# Patient Record
Sex: FEMALE | Race: WHITE | Employment: OTHER | URBAN - NONMETROPOLITAN AREA
[De-identification: names, ages, dates, MRNs, and addresses within clinical notes are randomized per-mention and may not be internally consistent; named-entity substitution may affect disease eponyms.]

---

## 2018-03-12 ENCOUNTER — APPOINTMENT (OUTPATIENT)
Dept: CT IMAGING | Facility: OTHER | Age: 63
DRG: 694 | End: 2018-03-12
Attending: FAMILY MEDICINE
Payer: COMMERCIAL

## 2018-03-12 ENCOUNTER — HOSPITAL ENCOUNTER (INPATIENT)
Facility: OTHER | Age: 63
LOS: 1 days | Discharge: HOME | DRG: 694 | End: 2018-03-13
Attending: FAMILY MEDICINE | Admitting: INTERNAL MEDICINE
Payer: COMMERCIAL

## 2018-03-12 ENCOUNTER — OFFICE VISIT (OUTPATIENT)
Dept: FAMILY MEDICINE | Facility: OTHER | Age: 63
DRG: 694 | End: 2018-03-12
Attending: NURSE PRACTITIONER
Payer: COMMERCIAL

## 2018-03-12 VITALS
TEMPERATURE: 98.1 F | SYSTOLIC BLOOD PRESSURE: 212 MMHG | WEIGHT: 213.8 LBS | DIASTOLIC BLOOD PRESSURE: 120 MMHG | BODY MASS INDEX: 34.51 KG/M2

## 2018-03-12 DIAGNOSIS — R30.0 DYSURIA: Primary | ICD-10-CM

## 2018-03-12 DIAGNOSIS — N20.1 CALCULUS OF URETER: Primary | ICD-10-CM

## 2018-03-12 DIAGNOSIS — I10 ESSENTIAL HYPERTENSION: ICD-10-CM

## 2018-03-12 DIAGNOSIS — Z87.891 PERSONAL HISTORY OF TOBACCO USE, PRESENTING HAZARDS TO HEALTH: ICD-10-CM

## 2018-03-12 DIAGNOSIS — Z53.9 ERRONEOUS ENCOUNTER--DISREGARD: ICD-10-CM

## 2018-03-12 PROBLEM — N13.2 URETERAL STONE WITH HYDRONEPHROSIS: Status: ACTIVE | Noted: 2018-03-12

## 2018-03-12 PROBLEM — N23 RENAL COLIC: Status: ACTIVE | Noted: 2018-03-12

## 2018-03-12 LAB
ALBUMIN SERPL-MCNC: 4.5 G/DL (ref 3.5–5.7)
ALBUMIN UR-MCNC: ABNORMAL MG/DL
ALP SERPL-CCNC: 73 U/L (ref 34–104)
ALT SERPL W P-5'-P-CCNC: 24 U/L (ref 7–52)
ANION GAP SERPL CALCULATED.3IONS-SCNC: 10 MMOL/L (ref 3–14)
APPEARANCE UR: CLEAR
AST SERPL W P-5'-P-CCNC: 22 U/L (ref 13–39)
BACTERIA #/AREA URNS HPF: ABNORMAL /HPF
BASOPHILS # BLD AUTO: 0 10E9/L (ref 0–0.2)
BASOPHILS NFR BLD AUTO: 0.3 %
BILIRUB SERPL-MCNC: 0.7 MG/DL (ref 0.3–1)
BILIRUB UR QL STRIP: NEGATIVE
BUN SERPL-MCNC: 13 MG/DL (ref 7–25)
CALCIUM SERPL-MCNC: 9.4 MG/DL (ref 8.6–10.3)
CHLORIDE SERPL-SCNC: 104 MMOL/L (ref 98–107)
CO2 SERPL-SCNC: 26 MMOL/L (ref 21–31)
COLOR UR AUTO: YELLOW
CREAT SERPL-MCNC: 0.87 MG/DL (ref 0.6–1.2)
DIFFERENTIAL METHOD BLD: ABNORMAL
EOSINOPHIL # BLD AUTO: 0 10E9/L (ref 0–0.7)
EOSINOPHIL NFR BLD AUTO: 0.3 %
ERYTHROCYTE [DISTWIDTH] IN BLOOD BY AUTOMATED COUNT: 12.3 % (ref 10–15)
GFR SERPL CREATININE-BSD FRML MDRD: 66 ML/MIN/1.7M2
GLUCOSE SERPL-MCNC: 105 MG/DL (ref 70–105)
GLUCOSE UR STRIP-MCNC: NEGATIVE MG/DL
HCT VFR BLD AUTO: 46.4 % (ref 35–47)
HGB BLD-MCNC: 15.6 G/DL (ref 11.7–15.7)
HGB UR QL STRIP: ABNORMAL
IMM GRANULOCYTES # BLD: 0 10E9/L (ref 0–0.4)
IMM GRANULOCYTES NFR BLD: 0.3 %
KETONES UR STRIP-MCNC: NEGATIVE MG/DL
LEUKOCYTE ESTERASE UR QL STRIP: NEGATIVE
LYMPHOCYTES # BLD AUTO: 0.7 10E9/L (ref 0.8–5.3)
LYMPHOCYTES NFR BLD AUTO: 8.8 %
MAGNESIUM SERPL-MCNC: 1.9 MG/DL (ref 1.9–2.7)
MCH RBC QN AUTO: 29 PG (ref 26.5–33)
MCHC RBC AUTO-ENTMCNC: 33.6 G/DL (ref 31.5–36.5)
MCV RBC AUTO: 86 FL (ref 78–100)
MONOCYTES # BLD AUTO: 0.4 10E9/L (ref 0–1.3)
MONOCYTES NFR BLD AUTO: 5.1 %
NEUTROPHILS # BLD AUTO: 6.3 10E9/L (ref 1.6–8.3)
NEUTROPHILS NFR BLD AUTO: 85.2 %
NITRATE UR QL: NEGATIVE
NON-SQ EPI CELLS #/AREA URNS LPF: ABNORMAL /LPF
PH UR STRIP: 7 PH (ref 5–7)
PLATELET # BLD AUTO: 174 10E9/L (ref 150–450)
POTASSIUM SERPL-SCNC: 3.8 MMOL/L (ref 3.5–5.1)
PROT SERPL-MCNC: 7.7 G/DL (ref 6.4–8.9)
RBC # BLD AUTO: 5.38 10E12/L (ref 3.8–5.2)
RBC #/AREA URNS AUTO: ABNORMAL /HPF
SODIUM SERPL-SCNC: 140 MMOL/L (ref 134–144)
SOURCE: ABNORMAL
SP GR UR STRIP: 1.02 (ref 1–1.03)
UROBILINOGEN UR STRIP-ACNC: 0.2 EU/DL (ref 0.2–1)
WBC # BLD AUTO: 7.4 10E9/L (ref 4–11)
WBC #/AREA URNS AUTO: ABNORMAL /HPF

## 2018-03-12 PROCEDURE — 82365 CALCULUS SPECTROSCOPY: CPT | Performed by: INTERNAL MEDICINE

## 2018-03-12 PROCEDURE — 99223 1ST HOSP IP/OBS HIGH 75: CPT | Mod: 25 | Performed by: UROLOGY

## 2018-03-12 PROCEDURE — 25000128 H RX IP 250 OP 636: Performed by: FAMILY MEDICINE

## 2018-03-12 PROCEDURE — 74176 CT ABD & PELVIS W/O CONTRAST: CPT

## 2018-03-12 PROCEDURE — 83735 ASSAY OF MAGNESIUM: CPT | Performed by: INTERNAL MEDICINE

## 2018-03-12 PROCEDURE — 93010 ELECTROCARDIOGRAM REPORT: CPT | Performed by: INTERNAL MEDICINE

## 2018-03-12 PROCEDURE — 99285 EMERGENCY DEPT VISIT HI MDM: CPT | Mod: 25 | Performed by: FAMILY MEDICINE

## 2018-03-12 PROCEDURE — 25000132 ZZH RX MED GY IP 250 OP 250 PS 637: Performed by: FAMILY MEDICINE

## 2018-03-12 PROCEDURE — 25000132 ZZH RX MED GY IP 250 OP 250 PS 637: Performed by: INTERNAL MEDICINE

## 2018-03-12 PROCEDURE — 12000000 ZZH R&B MED SURG/OB

## 2018-03-12 PROCEDURE — 96374 THER/PROPH/DIAG INJ IV PUSH: CPT | Performed by: FAMILY MEDICINE

## 2018-03-12 PROCEDURE — 85025 COMPLETE CBC W/AUTO DIFF WBC: CPT | Performed by: FAMILY MEDICINE

## 2018-03-12 PROCEDURE — 80053 COMPREHEN METABOLIC PANEL: CPT | Performed by: FAMILY MEDICINE

## 2018-03-12 PROCEDURE — 96375 TX/PRO/DX INJ NEW DRUG ADDON: CPT | Performed by: FAMILY MEDICINE

## 2018-03-12 PROCEDURE — 81001 URINALYSIS AUTO W/SCOPE: CPT | Performed by: NURSE PRACTITIONER

## 2018-03-12 PROCEDURE — 25000128 H RX IP 250 OP 636: Performed by: INTERNAL MEDICINE

## 2018-03-12 PROCEDURE — 99222 1ST HOSP IP/OBS MODERATE 55: CPT | Mod: AI | Performed by: INTERNAL MEDICINE

## 2018-03-12 PROCEDURE — 93005 ELECTROCARDIOGRAM TRACING: CPT | Performed by: FAMILY MEDICINE

## 2018-03-12 PROCEDURE — 99285 EMERGENCY DEPT VISIT HI MDM: CPT | Mod: Z6 | Performed by: FAMILY MEDICINE

## 2018-03-12 PROCEDURE — 36415 COLL VENOUS BLD VENIPUNCTURE: CPT | Performed by: FAMILY MEDICINE

## 2018-03-12 RX ORDER — BISACODYL 10 MG
10 SUPPOSITORY, RECTAL RECTAL DAILY PRN
Status: DISCONTINUED | OUTPATIENT
Start: 2018-03-12 | End: 2018-03-13 | Stop reason: HOSPADM

## 2018-03-12 RX ORDER — TAMSULOSIN HYDROCHLORIDE 0.4 MG/1
0.8 CAPSULE ORAL DAILY
Status: DISCONTINUED | OUTPATIENT
Start: 2018-03-12 | End: 2018-03-13 | Stop reason: HOSPADM

## 2018-03-12 RX ORDER — KETOROLAC TROMETHAMINE 15 MG/ML
15 INJECTION, SOLUTION INTRAMUSCULAR; INTRAVENOUS ONCE
Status: COMPLETED | OUTPATIENT
Start: 2018-03-12 | End: 2018-03-12

## 2018-03-12 RX ORDER — HYDROMORPHONE HYDROCHLORIDE 1 MG/ML
0.5 INJECTION, SOLUTION INTRAMUSCULAR; INTRAVENOUS; SUBCUTANEOUS
Status: COMPLETED | OUTPATIENT
Start: 2018-03-12 | End: 2018-03-12

## 2018-03-12 RX ORDER — PROCHLORPERAZINE 25 MG
25 SUPPOSITORY, RECTAL RECTAL EVERY 12 HOURS PRN
Status: DISCONTINUED | OUTPATIENT
Start: 2018-03-12 | End: 2018-03-13 | Stop reason: HOSPADM

## 2018-03-12 RX ORDER — TAMSULOSIN HYDROCHLORIDE 0.4 MG/1
0.4 CAPSULE ORAL ONCE
Status: COMPLETED | OUTPATIENT
Start: 2018-03-12 | End: 2018-03-12

## 2018-03-12 RX ORDER — PROCHLORPERAZINE MALEATE 10 MG
10 TABLET ORAL EVERY 6 HOURS PRN
Status: DISCONTINUED | OUTPATIENT
Start: 2018-03-12 | End: 2018-03-13 | Stop reason: HOSPADM

## 2018-03-12 RX ORDER — LIDOCAINE 40 MG/G
CREAM TOPICAL
Status: DISCONTINUED | OUTPATIENT
Start: 2018-03-12 | End: 2018-03-13 | Stop reason: HOSPADM

## 2018-03-12 RX ORDER — AMOXICILLIN 250 MG
2 CAPSULE ORAL 2 TIMES DAILY PRN
Status: DISCONTINUED | OUTPATIENT
Start: 2018-03-12 | End: 2018-03-13 | Stop reason: HOSPADM

## 2018-03-12 RX ORDER — HYDROMORPHONE HYDROCHLORIDE 1 MG/ML
0.5 INJECTION, SOLUTION INTRAMUSCULAR; INTRAVENOUS; SUBCUTANEOUS
Status: DISCONTINUED | OUTPATIENT
Start: 2018-03-12 | End: 2018-03-13 | Stop reason: HOSPADM

## 2018-03-12 RX ORDER — ACETAMINOPHEN 325 MG/1
650 TABLET ORAL EVERY 4 HOURS PRN
Status: DISCONTINUED | OUTPATIENT
Start: 2018-03-12 | End: 2018-03-13 | Stop reason: HOSPADM

## 2018-03-12 RX ORDER — ACETAMINOPHEN 650 MG/1
650 SUPPOSITORY RECTAL EVERY 4 HOURS PRN
Status: DISCONTINUED | OUTPATIENT
Start: 2018-03-12 | End: 2018-03-13 | Stop reason: HOSPADM

## 2018-03-12 RX ORDER — KETOROLAC TROMETHAMINE 15 MG/ML
15 INJECTION, SOLUTION INTRAMUSCULAR; INTRAVENOUS EVERY 6 HOURS PRN
Status: DISCONTINUED | OUTPATIENT
Start: 2018-03-12 | End: 2018-03-13 | Stop reason: HOSPADM

## 2018-03-12 RX ORDER — POTASSIUM CHLORIDE 1500 MG/1
20-40 TABLET, EXTENDED RELEASE ORAL
Status: DISCONTINUED | OUTPATIENT
Start: 2018-03-12 | End: 2018-03-13 | Stop reason: HOSPADM

## 2018-03-12 RX ORDER — ONDANSETRON 4 MG/1
4 TABLET, ORALLY DISINTEGRATING ORAL EVERY 6 HOURS PRN
Status: DISCONTINUED | OUTPATIENT
Start: 2018-03-12 | End: 2018-03-13 | Stop reason: HOSPADM

## 2018-03-12 RX ORDER — SODIUM CHLORIDE 9 MG/ML
INJECTION, SOLUTION INTRAVENOUS CONTINUOUS
Status: DISCONTINUED | OUTPATIENT
Start: 2018-03-12 | End: 2018-03-13 | Stop reason: HOSPADM

## 2018-03-12 RX ORDER — NAPROXEN SODIUM 220 MG
220 TABLET ORAL 2 TIMES DAILY PRN
COMMUNITY
End: 2018-09-17

## 2018-03-12 RX ORDER — POTASSIUM CHLORIDE 7.45 MG/ML
10 INJECTION INTRAVENOUS
Status: DISCONTINUED | OUTPATIENT
Start: 2018-03-12 | End: 2018-03-13 | Stop reason: HOSPADM

## 2018-03-12 RX ORDER — MAGNESIUM SULFATE HEPTAHYDRATE 40 MG/ML
4 INJECTION, SOLUTION INTRAVENOUS EVERY 4 HOURS PRN
Status: DISCONTINUED | OUTPATIENT
Start: 2018-03-12 | End: 2018-03-13 | Stop reason: HOSPADM

## 2018-03-12 RX ORDER — NALOXONE HYDROCHLORIDE 0.4 MG/ML
.1-.4 INJECTION, SOLUTION INTRAMUSCULAR; INTRAVENOUS; SUBCUTANEOUS
Status: DISCONTINUED | OUTPATIENT
Start: 2018-03-12 | End: 2018-03-13 | Stop reason: HOSPADM

## 2018-03-12 RX ORDER — ONDANSETRON 2 MG/ML
4 INJECTION INTRAMUSCULAR; INTRAVENOUS EVERY 6 HOURS PRN
Status: DISCONTINUED | OUTPATIENT
Start: 2018-03-12 | End: 2018-03-13 | Stop reason: HOSPADM

## 2018-03-12 RX ORDER — METOCLOPRAMIDE HYDROCHLORIDE 5 MG/ML
10 INJECTION INTRAMUSCULAR; INTRAVENOUS ONCE
Status: COMPLETED | OUTPATIENT
Start: 2018-03-12 | End: 2018-03-12

## 2018-03-12 RX ORDER — AMOXICILLIN 250 MG
1 CAPSULE ORAL 2 TIMES DAILY PRN
Status: DISCONTINUED | OUTPATIENT
Start: 2018-03-12 | End: 2018-03-13 | Stop reason: HOSPADM

## 2018-03-12 RX ORDER — HYDROMORPHONE HYDROCHLORIDE 1 MG/ML
0.5 INJECTION, SOLUTION INTRAMUSCULAR; INTRAVENOUS; SUBCUTANEOUS
Status: DISCONTINUED | OUTPATIENT
Start: 2018-03-12 | End: 2018-03-12

## 2018-03-12 RX ADMIN — HYDROMORPHONE HYDROCHLORIDE 0.5 MG: 1 INJECTION, SOLUTION INTRAMUSCULAR; INTRAVENOUS; SUBCUTANEOUS at 11:27

## 2018-03-12 RX ADMIN — TAMSULOSIN HYDROCHLORIDE 0.4 MG: 0.4 CAPSULE ORAL at 10:13

## 2018-03-12 RX ADMIN — HYDROMORPHONE HYDROCHLORIDE 0.5 MG: 1 INJECTION, SOLUTION INTRAMUSCULAR; INTRAVENOUS; SUBCUTANEOUS at 13:35

## 2018-03-12 RX ADMIN — SODIUM CHLORIDE: 900 INJECTION, SOLUTION INTRAVENOUS at 13:26

## 2018-03-12 RX ADMIN — SODIUM CHLORIDE: 900 INJECTION, SOLUTION INTRAVENOUS at 21:43

## 2018-03-12 RX ADMIN — TAMSULOSIN HYDROCHLORIDE 0.4 MG: 0.4 CAPSULE ORAL at 13:25

## 2018-03-12 RX ADMIN — KETOROLAC TROMETHAMINE 15 MG: 15 INJECTION, SOLUTION INTRAMUSCULAR; INTRAVENOUS at 11:04

## 2018-03-12 RX ADMIN — METOCLOPRAMIDE 10 MG: 5 INJECTION, SOLUTION INTRAMUSCULAR; INTRAVENOUS at 11:00

## 2018-03-12 RX ADMIN — HYDROMORPHONE HYDROCHLORIDE 0.5 MG: 1 INJECTION, SOLUTION INTRAMUSCULAR; INTRAVENOUS; SUBCUTANEOUS at 16:05

## 2018-03-12 ASSESSMENT — ENCOUNTER SYMPTOMS
LIGHT-HEADEDNESS: 0
DYSURIA: 1
CHILLS: 0
BACK PAIN: 0
BRUISES/BLEEDS EASILY: 0
HEADACHES: 0
FEVER: 0
AGITATION: 0
STRIDOR: 0
ABDOMINAL DISTENTION: 0
SHORTNESS OF BREATH: 0
COUGH: 0
CONFUSION: 0
ADENOPATHY: 0
DIFFICULTY URINATING: 1

## 2018-03-12 ASSESSMENT — PAIN SCALES - GENERAL: PAINLEVEL: EXTREME PAIN (8)

## 2018-03-12 NOTE — CONSULTS
I was asked to see this patient by Dr Hedrick and provide my opinion about the following:  Ureteral stone    Type of Visit  Consult    Chief Complaint  Ureteral stone    HPI  Ms. Perea is a 62 year old female who presents with right ureteral stone.  The patient initially presented to the ED today and was admitted due to intractable nausea/vomiting and severe flank pain.  At that time the patient underwent a CT scan which revealed a 4 mm obstructing stone.  The patient currently denies fevers or chills.  The patient currently denies nausea or vomiting.  She has not undergone surgery in the past for stones.    Pain ROS  Location:  Right  flank  Quality:    Sharp and Dull  Provocative factors:  Nothing makes it worse  Palliative factors:   IV narcotics makes it better  Radiation:   None  Severity:   2/10 currently, 10/10 earlier today  Time:     Pain started yesterday      Past Medical History  She  has no past medical history on file.  Patient Active Problem List   Diagnosis     Renal colic     Ureteral stone with hydronephrosis     HTN (hypertension)       Past Surgical History  She  has a past surgical history that includes other surgical history.    Medications  None as an outpatient    Allergies  No Known Allergies    Social History  She  reports that she quit smoking about 27 years ago. She has never used smokeless tobacco. She reports that she does not drink alcohol.  No drug abuse.    Family History  No FH of  cancers.  +stones: father    Review of Systems  I personally reviewed the ROS with the patient.    Weight loss: No   Recent fever/chills: No  Night sweats: No   Current skin rash: No   Recent hair loss: No   Heat intolerance: No   Cold intolerance: No   Chest pain: No   Palpitations: No   Shortness of breath: No  Wheezing: No   Constipation: No   Diarrhea: No   Nausea: Yes    Vomiting: Yes   Kidney/side pain: Yes   Back pain: No  Frequent headaches: No  Dizziness: No   Leg swelling: No   Calf pain:  "No    Physical Exam  Vitals:    03/12/18 1125 03/12/18 1154 03/12/18 1200 03/12/18 1217   BP:  (!) 159/100 (!) 159/93 157/89   Pulse:    81   Resp:    20   Temp:    98.7  F (37.1  C)   TempSrc:    Tympanic   SpO2: 94% 92% 93% 96%   Weight:    94.7 kg (208 lb 12.4 oz)   Height:    1.651 m (5' 5\")     Constitutional: uncomfortable, WDWN  Head: NCAT  Eyes: Conjunctivae normal  Cardiovascular: Regular rate  Pulmonary/Chest: Respirations are even and non-labored bilaterally  Abdominal: Soft with no distension, tenderness, masses, guarding.    - left CVA tenderness    + right CVA tenderness  Neurological: A + O x 3,  cranial nerves II-XII grossly intact  Extremities: RADHA x 4, warm, no clubbing, no cyanosis  Skin: Pink, warm, dry with no rash  Psychiatric:  Normal mood and affect  Genitourinary: Nonpalpable bladder    Labs  Results for orders placed or performed during the hospital encounter of 03/12/18   CT Abdomen Pelvis without Contrast (stone protocol)    Narrative    PROCEDURE:  CT ABDOMEN PELVIS W/O CONTRAST    HISTORY:  Abdominal pain;  right flank pain, hypertension.    TECHNIQUE:  Helical CT of the abdomen and pelvis was performed without  intravenous contrast. Sagittal and coronal reformatted images were  reviewed.    COMPARISON:  CT abdomen pelvis 2/25/2008    FINDINGS:      The lung bases are clear.    The liver is low in attenuation, suggesting hepatic steatosis.  Otherwise, the noncontrast appearance of the liver, spleen, pancreas  and adrenal glands are unremarkable. The gallbladder is present.    There is mild right hydronephrosis and perinephric stranding. There is  a 5 mm calculus in the urinary bladder, likely a recently passed right  ureteral calculus. No additional renal calculi are seen. The left  kidney is intact.    The bowel is normal in caliber. There has been prior appendectomy.  There are a few diverticula in the colon without evidence of  diverticulitis.     The aorta is normal in size with " scattered atherosclerotic  calcifications.    No free fluid, free air or adenopathy is present.      No suspicious osseous lesions are identified. There are degenerative  changes in the spine.      Impression    IMPRESSION:    1. Mild right hydronephrosis and perinephric stranding. 5 mm calculus  in the bladder, likely a recently passed right ureteral stone.  2. Hepatic steatosis.    SOPHIA SAINZ MD   CBC with platelets differential   Result Value Ref Range    WBC 7.4 4.0 - 11.0 10e9/L    RBC Count 5.38 (H) 3.8 - 5.2 10e12/L    Hemoglobin 15.6 11.7 - 15.7 g/dL    Hematocrit 46.4 35.0 - 47.0 %    MCV 86 78 - 100 fl    MCH 29.0 26.5 - 33.0 pg    MCHC 33.6 31.5 - 36.5 g/dL    RDW 12.3 10.0 - 15.0 %    Platelet Count 174 150 - 450 10e9/L    Diff Method Automated Method     % Neutrophils 85.2 %    % Lymphocytes 8.8 %    % Monocytes 5.1 %    % Eosinophils 0.3 %    % Basophils 0.3 %    % Immature Granulocytes 0.3 %    Absolute Neutrophil 6.3 1.6 - 8.3 10e9/L    Absolute Lymphocytes 0.7 (L) 0.8 - 5.3 10e9/L    Absolute Monocytes 0.4 0.0 - 1.3 10e9/L    Absolute Eosinophils 0.0 0.0 - 0.7 10e9/L    Absolute Basophils 0.0 0.0 - 0.2 10e9/L    Abs Immature Granulocytes 0.0 0 - 0.4 10e9/L   Comprehensive metabolic panel   Result Value Ref Range    Sodium 140 134 - 144 mmol/L    Potassium 3.8 3.5 - 5.1 mmol/L    Chloride 104 98 - 107 mmol/L    Carbon Dioxide 26 21 - 31 mmol/L    Anion Gap 10 3 - 14 mmol/L    Glucose 105 70 - 105 mg/dL    Urea Nitrogen 13 7 - 25 mg/dL    Creatinine 0.87 0.60 - 1.20 mg/dL    GFR Estimate 66 >60 mL/min/1.7m2    GFR Estimate If Black 80 >60 mL/min/1.7m2    Calcium 9.4 8.6 - 10.3 mg/dL    Bilirubin Total 0.7 0.3 - 1.0 mg/dL    Albumin 4.5 3.5 - 5.7 g/dL    Protein Total 7.7 6.4 - 8.9 g/dL    Alkaline Phosphatase 73 34 - 104 U/L    ALT 24 7 - 52 U/L    AST 22 13 - 39 U/L   Magnesium   Result Value Ref Range    Magnesium 1.9 1.9 - 2.7 mg/dL     Results for JULISSA NAVARRO (MRN [id number]) as of  3/12/2018 18:02   3/12/2018 08:45   Color Urine Yellow   Appearance Urine Clear   Glucose Urine Negative   Bilirubin Urine Negative   Ketones Urine Negative   Specific Gravity Urine 1.025   pH Urine 7.0   Protein Albumin Urine Trace (A)   Urobilinogen Urine 0.2   Nitrite Urine Negative   Blood Urine Large (A)   Leukocyte Esterase Urine Negative   Source Midstream Urine   WBC Urine 0 - 5   RBC Urine 25-50 (A)   Bacteria Urine Moderate (A)   Squamous Epithelial /LPF Urine Few       Imaging  I personally reviewed and interpreted the images and report.  CT a/p   3/12/2018  IMPRESSION:    1. Mild right hydronephrosis and perinephric stranding. 5 mm calculus  in the bladder, likely a recently passed right ureteral stone.  2. Hepatic steatosis.    Assessment  Ms. Perea is a 62 year old female who presents with right ureteral stone.  Stone is not in bladder, it is at UVJ.    Discussed the treatment options for the right stone including medical expulsive therapy and ureteroscopy with laser lithotripsy.    After explaining the risks, benefits, and alternatives a decision was made to proceed with ureteroscopy/laser lithotripsy.    The patient was explained the specific risks of bleeding, pain, infection, and ureteral injury.    In addition, the patient was told a stent may need to be placed which could result in urinary frequency, urgency, dysuria, and flank pain with voiding.    It would require an office based procedure to remove it at a later date.    Finally, the patient was told if the stone was very impacted, that we would place a stent and return at a later date to treat the stone.      Plan  The patient was scheduled and consented for right ureteroscopy with laser lithotripsy, ureteral stent placement in the OR.  NPO at midnight  Strain urine and cancel surgery if she passes it

## 2018-03-12 NOTE — PROGRESS NOTES
"[facility] ADMISSION NOTE    Patient admitted to room 318 at approximately 1230 via wheel chair from emergency room. Patient was accompanied by daughter.     Verbal SBAR report received from Janneth prior to patient arrival.     Patient ambulated to bed independently. Patient alert and oriented X 3. The patient is not having any pain. 0-10 Pain Scale: 7. Admission vital signs: Blood pressure 157/89, pulse 81, temperature 98.7  F (37.1  C), temperature source Tympanic, resp. rate 20, height 1.651 m (5' 5\"), weight 94.7 kg (208 lb 12.4 oz), SpO2 96 %, not currently breastfeeding. Patient was oriented to plan of care, call light, bed controls, tv, telephone, bathroom and visiting hours.     The following safety risks were identified during admission: none. Yellow risk band applied: JASSON Huston    "

## 2018-03-12 NOTE — PROGRESS NOTES
"HPI:    Donna Perea is a 62 year old female who presents to clinic today for urinary concerns. She reports dysuria, low back pain. Sx started last night. Having minimal burning, but is having frequency, urgency. No hematuria. Denies n/v, abdominal pain or fevers. No constipation or diarrhea recently. Reports hx of dysuria that lasted 1 day about a 2 weeks ago but this resolved. Hx of smoking. Taking Aleve for back pain. No hx of urinary concerns.     She hasn't been to a doctor for many years. She has elevated blood pressure today, 212/120. She denies any headaches. Will get a \"sharpness\" in her chest when she is laying down reading at night. The pain is nothing that seems to concern her, nothing that would bring her to the clinic. She is on no medications. When asked about family hx of heart problems, blood pressure she says \"sure, I am sure there is\".     Given elevated blood pressure she was transported to ER for further evaluation and management. GISELLE Conroy CNP on 3/12/2018 at 12:54 PM      Appointment cancelled.   "

## 2018-03-12 NOTE — NURSING NOTE
"Patient presents today for painful urination and low back pain. Patient woke up last night with low back and painful urination. Patient states, \"one week ago, she had one day with urgency and painful urination, but it went away\".    Julissa Aguila LPN on 3/12/2018 at 8:53 AM    "

## 2018-03-12 NOTE — PLAN OF CARE
Problem: Urine Elimination Impaired (Adult)  Goal: Effective Urinary Elimination  Patient will demonstrate the desired outcomes by discharge/transition of care.   Patient has not voided since 12:30 pm. She does report voiding in the Er prior to coming over. Plan will be around five pm for patient to get up and try to void. She has no distention or feel the need to void.    Problem: Pain, Acute (Adult)  Goal: Acceptable Pain Control/Comfort Level  Patient will demonstrate the desired outcomes by discharge/transition of care.   When patient was admitted she reported no pain. She has had pain increase. The prn dilaudid does well for pain control. She has received two doses, since admit, frequency changed to two hours prn. Patient in agreement to med plan.

## 2018-03-12 NOTE — MR AVS SNAPSHOT
After Visit Summary   3/12/2018    Donna Perea    MRN: [id number]           Patient Information     Date Of Birth          1955        Visit Information        Provider Department      3/12/2018 9:45 AM Mary Butterfield APRN CNP [facility]        Today's Diagnoses     Dysuria    -  1    ERRONEOUS ENCOUNTER--DISREGARD           Follow-ups after your visit        Future tests that were ordered for you today     Open Standing Orders        Priority Remaining Interval Expires Ordered    Activity: Up ad jeri Routine [id number] PRN  3/12/2018    Daily weights Routine 1/1 DAILY  3/12/2018    Basic metabolic panel Routine 1/1 AM DRAW  3/12/2018    CBC with platelets Routine 1/1 AM DRAW  3/12/2018    Potassium Routine 100/100 CONDITIONAL (SPECIFY)  3/12/2018    Magnesium Routine 100/100 CONDITIONAL (SPECIFY)  3/12/2018    NPO per Anesthesia Guidelines for Procedure/Surgery Except for: Meds Routine 1/1 DIET EFFECTIVE MIDNIGHT  3/12/2018            Who to contact     If you have questions or need follow up information about today's clinic visit or your schedule please contact [facility] AND hospitals directly at [phone].  Normal or non-critical lab and imaging results will be communicated to you by [employer], letter or phone within 4 business days after the clinic has received the results. If you do not hear from us within 7 days, please contact the clinic through [employer] or phone. If you have a critical or abnormal lab result, we will notify you by phone as soon as possible.  Submit refill requests through [employer] or call your pharmacy and they will forward the refill request to us. Please allow 3 business days for your refill to be completed.          Additional Information About Your Visit        MyChart Information     [employer] lets you send messages to your doctor, view your test results, renew your prescriptions, schedule appointments and more. To sign up, go to  [email or web address] . Click on \"Log in\" on the left side of the screen, which will take you to the Welcome page. Then click on \"Sign up Now\" on the right side of the page.     You will be asked to enter the access code listed below, as well as some personal information. Please follow the directions to create your username and password.     Your access code is: [email or web address]  Expires: 6/10/2018 12:55 PM     Your access code will  in 90 days. If you need help or a new code, please call your [place] clinic or [phone].        Care EveryWhere ID     This is your Care EveryWhere ID. This could be used by other organizations to access your [place] medical records  [id number]        Your Vitals Were     Temperature Breastfeeding? BMI (Body Mass Index)             98.1  F (36.7  C) (Oral) No 34.51 kg/m2          Blood Pressure from Last 3 Encounters:   18 157/89   18 (!) 212/120   16 150/80    Weight from Last 3 Encounters:   18 208 lb 12.4 oz (94.7 kg)   18 213 lb 12.8 oz (97 kg)   16 204 lb (92.5 kg)              We Performed the Following     UA reflex to Microscopic     Urine Microscopic        Primary Care Provider Fax #    Physician No Ref-Primary [phone]       No address on file        Equal Access to Services     SERJIO CAMILO : Hadii beni sandrao Socarmelita, waaxda luqadaha, qaybta kaalmada ademarcialyaalycia, leeann bella . So [facility] [phone].    ATENCIÓN: Si habla español, tiene a means disposición servicios gratuitos de asistencia lingüística. Llame al [phone].    We comply with applicable federal civil rights laws and [place] laws. We do not discriminate on the basis of race, color, national origin, age, disability, sex, sexual orientation, or gender identity.            Thank you!     Thank you for choosing [facility] AND [facility]  for your care. Our goal is always to provide you with excellent care. Hearing back from " our patients is one way we can continue to improve our services. Please take a few minutes to complete the written survey that you may receive in the mail after your visit with us. Thank you!             Your Updated Medication List - Protect others around you: Learn how to safely use, store and throw away your medicines at www.disposemymeds.org.      Notice  As of 3/12/2018  9:41 AM    You have not been prescribed any medications.

## 2018-03-12 NOTE — IP AVS SNAPSHOT
[facility]    [address]    [place] [address]    Phone:  [phone]    Fax:  [phone]                                       After Visit Summary   3/12/2018    Donna Perea    MRN: [id number]           After Visit Summary Signature Page     I have received my discharge instructions, and my questions have been answered. I have discussed any challenges I see with this plan with the nurse or doctor.    ..........................................................................................................................................  Patient/Patient Representative Signature      ..........................................................................................................................................  Patient Representative Print Name and Relationship to Patient    ..................................................               ................................................  Date                                            Time    ..........................................................................................................................................  Reviewed by Signature/Title    ...................................................              ..............................................  Date                                                            Time

## 2018-03-12 NOTE — ED NOTES
"ED Nursing Triage Note (General)   ________________________________    Donna Perea is a 62 year old Female that presents to triage private car  With history of  Going to the clinic for right back and flank and brought over by clinic staff to the ED due to her high blood pressure reported by patient  Significant symptoms had onset 18 hour(s) ago.  BP (!) 211/112  Pulse 86  Temp 96.6  F (35.9  C) (Temporal)  Resp 18  Ht 1.651 m (5' 5\")  Wt 97.1 kg (214 lb)  SpO2 96%  BMI 35.61 kg/m2t  Patient appears alert , in moderate distress., and cooperative behavior.  Airway: intact  Breathing noted as Normal.  Circulation normal  Skinnormal  Action taken: to room 906       PRE HOSPITAL PRIOR LIVING SITUATION Spouse and Children  "

## 2018-03-12 NOTE — ED TRIAGE NOTES
Patient came to the clinic today to be seen for pain in her right flank. BP in clinic was 210/110. The patient has no history of this per her reporting. Patient sent from clinic to ER to be seen for acute hypertensive episode.   She currently has flank pain on the right side of a 7 or 8 out of 10. The patient states that the pain is less when she is moving or standing. The pain started yesterday evening 3/11/18. The patient states she had an episode of this pain about 2 weeks ago that resolved on its own.   The patient has taken 2 doses of Aleve since last night.   Girish Chen RN 9:38 AM 03/12/18

## 2018-03-12 NOTE — PROGRESS NOTES
Patient was up to the bathroom to void. Urine is dark kya in color, some mucous flecks noted in urine. Also noted a small brown flrck, this was sent for analysis. Patient currently has no pain.

## 2018-03-12 NOTE — PHARMACY-ADMISSION MEDICATION HISTORY
Pharmacy -- Admission Medication Reconciliation    Prior to admission (PTA) medications were reviewed and the patient's PTA medication list was updated.    Sources Consulted: patient interviewanastacia    The reliability of this Medication Reconciliation is: Reliability: Reliable    The following significant changes were made:  ADDED: naproxen 220 mg PRN and lactaid PRN     In addition, the patient's allergies were reviewed with the patient and updated as follows:   Allergies: Review of patient's allergies indicates no known allergies.    The pharmacist has reviewed with the patient that all personal medications should be removed from the building or locked in the belongings safe.  Patient shall only take medications ordered by the physician and administered by the nursing staff.       Medication barriers identified: takes no prescription medications   Medication adherence concerns: takes no prescription medications   Understanding of emergency medications: n/a    Susi Cisneros, 3/12/2018,  12:31 PM

## 2018-03-12 NOTE — ED NOTES
Patient puts call light on to ask to use bathroom.  Upon moving to bathroom became nauseated and wretching/vomiting.  States her pain is 9/10 at this time.  Request order from MD and this given.

## 2018-03-12 NOTE — IP AVS SNAPSHOT
MRN:[id number]                      After Visit Summary   3/12/2018    Donna Perea    MRN: [id number]           Thank you!     Thank you for choosing [facility] for your care. Our goal is always to provide you with excellent care. Hearing back from our patients is one way we can continue to improve our services. Please take a few minutes to complete the written survey that you may receive in the mail after you visit with us. Thank you!        Patient Information     Date Of Birth          1955        About your hospital stay     You were admitted on:  March 12, 2018 You last received care in the:  [facility]    You were discharged on:  March 13, 2018        Reason for your hospital stay       Kidney stone                  Who to Call     For medical emergencies, please call 911.  For non-urgent questions about your medical care, please call your primary care provider or clinic, None          Attending Provider     Provider Specialty    Nghia Guillen MD Family Practice    Rafael Hedrick MD Internal Medicine       Primary Care Provider Fax #    Physician No Ref-Primary [phone]      After Care Instructions     Activity       Your activity upon discharge: activity as tolerated            Diet       Follow this diet upon discharge: Orders Placed This Encounter      Regular Diet Adult              Discharge Instructions       Strain urine for 3 days, collect any stones and bring to appt.                  Follow-up Appointments     Follow-up and recommended labs and tests       Follow up with Yoselin Kilgore NP on 3/26 at 930 to discuss your blood pressure  Follow up with Dr. Ardon on 3/26  at 1030 for kidney stone results..                  Your next 10 appointments already scheduled     Mar 26, 2018  9:30 AM CDT   Office Visit with Lianna Kilgore PA-C   [facility] ([facility])    [address]  [place] [address]  "  [phone]           Bring a current list of meds and any records pertaining to this visit. For Physicals, please bring immunization records and any forms needing to be filled out. Please arrive 10 minutes early to complete paperwork.            Mar 26, 2018 10:30 AM CDT   Return Visit with Shola Ardon MD   [facility] ([facility])    [address]  [place] [id number] [phone]              Pending Results     Date and Time Order Name Status Description    3/12/2018 1819 Stone analysis In process     3/12/2018 1001 EKG 12-lead, tracing only In process             Statement of Approval     Ordered          18 0857  I have reviewed and agree with all the recommendations and orders detailed in this document.  EFFECTIVE NOW     Approved and electronically signed by:  Rafael Hedrick MD             Admission Information     Date & Time Provider Department Dept. Phone    3/12/2018 Rafael Hedrick MD [facility] [phone]      Your Vitals Were     Blood Pressure Pulse Temperature Respirations Height Weight    180/101 73 97.7  F (36.5  C) (Temporal) 18 1.651 m (5' 5\") 94.5 kg (208 lb 6.4 oz)    Pulse Oximetry BMI (Body Mass Index)                95% 34.68 kg/m2          MyChart Information     [employer] lets you send messages to your doctor, view your test results, renew your prescriptions, schedule appointments and more. To sign up, go to [email or web address] . Click on \"Log in\" on the left side of the screen, which will take you to the Welcome page. Then click on \"Sign up Now\" on the right side of the page.     You will be asked to enter the access code listed below, as well as some personal information. Please follow the directions to create your username and password.     Your access code is: [email or web address]  Expires: 6/10/2018 12:55 PM     Your access code will  in 90 days. If you need help or a new code, please " call your [place] clinic or [phone].        Care EveryWhere ID     This is your Care EveryWhere ID. This could be used by other organizations to access your [place] medical records  [id number]        Equal Access to Services     SERJIO CAMILO AH: Hadii aad ku hadcourtney Sojigarali, waaxda luqadaha, qaybta kaalmada adeegyada, leeann alegria shayne montoya. So [facility] [phone].    ATENCIÓN: Si habla español, tiene a means disposición servicios gratuitos de asistencia lingüística. Llame al [phone].    We comply with applicable federal civil rights laws and [place] laws. We do not discriminate on the basis of race, color, national origin, age, disability, sex, sexual orientation, or gender identity.               Review of your medicines      START taking        Dose / Directions    amLODIPine 5 MG tablet   Commonly known as:  NORVASC        Dose:  5 mg   Start taking on:  3/14/2018   Take 1 tablet (5 mg) by mouth daily   Quantity:  30 tablet   Refills:  3         CONTINUE these medicines which have NOT CHANGED        Dose / Directions    ALEVE 220 MG tablet   Generic drug:  naproxen sodium        Dose:  220 mg   Take 220 mg by mouth 2 times daily as needed for moderate pain   Refills:  0       LACTAID PO        Dose:  3000 Units   Take 3,000 Units by mouth daily with food As needed   Refills:  0            Where to get your medicines      These medications were sent to [facility] [address] [place], [place] - [address] [facility] [address]     Phone:  [phone]     amLODIPine 5 MG tablet                Protect others around you: Learn how to safely use, store and throw away your medicines at www.disposemymeds.org.             Medication List: This is a list of all your medications and when to take them. Check marks below indicate your daily home schedule. Keep this list as a reference.      Medications           Morning Afternoon Evening  Bedtime As Needed    ALEVE 220 MG tablet   Take 220 mg by mouth 2 times daily as needed for moderate pain   Generic drug:  naproxen sodium                                amLODIPine 5 MG tablet   Commonly known as:  NORVASC   Take 1 tablet (5 mg) by mouth daily   Start taking on:  3/14/2018   Last time this was given:  5 mg on 3/13/2018  8:33 AM                                LACTAID PO   Take 3,000 Units by mouth daily with food As needed

## 2018-03-12 NOTE — H&P
[facility]    History and Physical  Hospitalist       Date of Admission:  3/12/2018    Assessment & Plan   Donna Perea is a 62 year old female who presents with flank pain.    Principal Problem:    Renal colic    Assessment: stone noted on CT at [facility]. Severe pain, requiring IV hydrocodone, and antiemetics.    Plan: Admit, IV dilaudid, ketorolac. Strain urine. Consult Dr. Ardon.    Active Problems:    Ureteral stone with hydronephrosis    Assessment: present on admission     Plan: urology consult      HTN (hypertension)    Assessment: poor control. Not on chronic meds.    Plan: monitor once pain controlled    # Pain Assessment:   Current Pain Score 3/12/2018 3/12/2018 3/12/2018   Pain score (0-10) 9 5 7   - Donna is experiencing pain due to renal colic. Pain management was discussed and the plan was created in a collaborative fashion.  Donna's response to the current recommendations: engaged  - Opioid regimen: IV dilaudid  - Response to opioid medications: Reduction of symptoms   - Bowel regimen: not needed  - Pharmacologic adjuvants: Ketorolac        DVT Prophylaxis: Pneumatic Compression Devices  Code Status: No Order    Rafael Hedrick    Primary Care Physician   Physician No Ref-Primary    Chief Complaint   Right flank pain    History is obtained from the patient and chart review.    History of Present Illness   Donna Perea is a 62 year old female who presents with less than 24 hours of right flank pain.  The pain is severe and sharp.  He had an episode like this 2 weeks ago that resolved on its own.  She took ibuprofen last evening and had some relief but this morning the pain was severe and unrelenting.  Nothing she did could make it better.  In the emergency department she was hypertensive as well.  She has no history of hypertension and takes no medication regularly.  Denies fevers or chills.    CT without contrast shows a 5 mm stone at the right ureteral vesicular junction.  Due to  her severe and unrelenting pain requiring IV Dilaudid, she was admitted to the hospital for management.    Past Medical History    I have reviewed this patient's medical history and updated it with pertinent information if needed.   No past medical history on file.    Past Surgical History   I have reviewed this patient's surgical history and updated it with pertinent information if needed.  Past Surgical History:   Procedure Laterality Date     OTHER SURGICAL HISTORY      1955,SUR38,APPENDECTOMY OPEN,intussesception and appy       Prior to Admission Medications   None     Allergies   No Known Allergies    Social History   I have reviewed this patient's social history and updated it with pertinent information if needed. Donna Perea  reports that she quit smoking about 27 years ago. She has never used smokeless tobacco. She reports that she does not drink alcohol.    Family History   I have reviewed this patient's family history and updated it with pertinent information if needed.   No family history on file.    Review of Systems   The 10 point Review of Systems is negative other than noted in the HPI or here.     Physical Exam   Temp: 96.6  F (35.9  C) Temp src: Temporal BP: (!) 193/110 Pulse: 86   Resp: 18 SpO2: 98 % O2 Device: None (Room air)    Vital Signs with Ranges  Temp:  [96.6  F (35.9  C)-98.1  F (36.7  C)] 96.6  F (35.9  C)  Pulse:  [86] 86  Resp:  [18] 18  BP: (193-212)/(110-120) 193/110  SpO2:  [96 %-98 %] 98 %  214 lbs 0 oz    Constitutional: In obvious distress  Eyes: pupils reactive, extraocular movements intact. Anicteric sclera.   HEENT: TM's normal, oropharynx nonerythematous. Neck supple, no JVD.  Respiratory: no crackles noted, no wheezes.  Cardiovascular: regular, no murmur. No lower extremity edema.  GI: soft, non-tender, bowel sounds present.  Lymph/Hematologic: no cervical or supraclavicular LAD.  Genitourinary: deferred  Skin: no rashes, or sores  Musculoskeletal: no joint erythema or  swelling  Neurologic: cranial nerves symmetric. Neuro exam nonfocal  Psychiatric: alert and oriented x3. Interactive.       Data   Data reviewed today:  I personally reviewed the abdominal CT image(s) showing 5 mm ureteral stone.    Recent Labs  Lab 03/12/18  1100      POTASSIUM 3.8   CHLORIDE 104   CO2 26   BUN 13   CR 0.87   ANIONGAP 10   SIOBHAN 9.4      ALBUMIN 4.5   PROTTOTAL 7.7   BILITOTAL 0.7   ALKPHOS 73   ALT 24   AST 22       Recent Results (from the past 24 hour(s))   CT Abdomen Pelvis without Contrast (stone protocol)    Narrative    PROCEDURE:  CT ABDOMEN PELVIS W/O CONTRAST    HISTORY:  Abdominal pain;  right flank pain, hypertension.    TECHNIQUE:  Helical CT of the abdomen and pelvis was performed without  intravenous contrast. Sagittal and coronal reformatted images were  reviewed.    COMPARISON:  CT abdomen pelvis 2/25/2008    FINDINGS:      The lung bases are clear.    The liver is low in attenuation, suggesting hepatic steatosis.  Otherwise, the noncontrast appearance of the liver, spleen, pancreas  and adrenal glands are unremarkable. The gallbladder is present.    There is mild right hydronephrosis and perinephric stranding. There is  a 5 mm calculus in the urinary bladder, likely a recently passed right  ureteral calculus. No additional renal calculi are seen. The left  kidney is intact.    The bowel is normal in caliber. There has been prior appendectomy.  There are a few diverticula in the colon without evidence of  diverticulitis.     The aorta is normal in size with scattered atherosclerotic  calcifications.    No free fluid, free air or adenopathy is present.      No suspicious osseous lesions are identified. There are degenerative  changes in the spine.      Impression    IMPRESSION:    1. Mild right hydronephrosis and perinephric stranding. 5 mm calculus  in the bladder, likely a recently passed right ureteral stone.  2. Hepatic steatosis.    SOPHIA SAINZ MD

## 2018-03-12 NOTE — ED PROVIDER NOTES
History     Chief Complaint   Patient presents with     Hypertension   Patient came to the clinic today to be seen for pain in her right flank. BP in clinic was 210/110. The patient has no history of this per her reporting. Patient sent from clinic to ER to be seen for acute hypertensive episode.   She currently has flank pain on the right side of a 7 or 8 out of 10. The patient states that the pain is less when she is moving or standing. The pain started yesterday evening 3/11/18. The patient states she had an episode of this pain about 2 weeks ago that resolved on its own.   The patient has taken 2 doses of Aleve since last night.   Girish Chen RN 9:38 AM 03/12/18     HEATHER Perea is a 62 year old female who presents the emergency department from clinic with elevated blood pressure and right flank pain.  Also burning with urination and frequency recently.  Report given from Kevan Butterfield in the clinic.  Patient denies any chest pain or shortness of breath.  I personally reviewed nurse's notes above, similar history related to me.  Problem List:    Patient Active Problem List    Diagnosis Date Noted     Renal colic 03/12/2018     Priority: Medium     Ureteral stone with hydronephrosis 03/12/2018     Priority: Medium     HTN (hypertension) 03/12/2018     Priority: Medium        Past Medical History:    No past medical history on file.    Past Surgical History:    Past Surgical History:   Procedure Laterality Date     OTHER SURGICAL HISTORY      1955,SUR38,APPENDECTOMY OPEN,intussesception and appy       Family History:    No family history on file.    Social History:  Marital Status:   [4]  Social History   Substance Use Topics     Smoking status: Former Smoker     Quit date: 1/15/1991     Smokeless tobacco: Never Used     Alcohol use No        Medications:      No current outpatient prescriptions on file.      Review of Systems   Constitutional: Negative for chills and fever.   HENT: Negative for  "congestion.    Respiratory: Negative for cough, shortness of breath and stridor.    Cardiovascular: Negative for chest pain.   Gastrointestinal: Negative for abdominal distention.   Endocrine: Negative for polyuria.   Genitourinary: Positive for difficulty urinating and dysuria.   Musculoskeletal: Negative for back pain.   Neurological: Negative for light-headedness and headaches.   Hematological: Negative for adenopathy. Does not bruise/bleed easily.   Psychiatric/Behavioral: Negative for agitation and confusion.       Physical Exam   BP: (!) 211/112  Pulse: 86  Temp: 96.6  F (35.9  C)  Resp: 18  Height: 165.1 cm (5' 5\")  Weight: 97.1 kg (214 lb)  SpO2: 96 %      Physical Exam   Constitutional: She is oriented to person, place, and time.   HENT:   Mouth/Throat: No oropharyngeal exudate.   Eyes: Conjunctivae and EOM are normal. Pupils are equal, round, and reactive to light.   Neck: Normal range of motion.   Cardiovascular: Normal rate.    No murmur heard.  Pulmonary/Chest: Breath sounds normal. No respiratory distress. She has no wheezes.   Abdominal: She exhibits no distension. There is no tenderness. There is no rebound.   Musculoskeletal: She exhibits no edema.   Neurological: She is alert and oriented to person, place, and time. She has normal reflexes.   Skin: Skin is warm and dry.   Nursing note and vitals reviewed.      ED Course     ED Course     Procedures             EKG shows normal sinus rhythm, normal ECG, rate 77  Results for orders placed or performed during the hospital encounter of 03/12/18 (from the past 24 hour(s))   CT Abdomen Pelvis without Contrast (stone protocol)    Narrative    PROCEDURE:  CT ABDOMEN PELVIS W/O CONTRAST    HISTORY:  Abdominal pain;  right flank pain, hypertension.    TECHNIQUE:  Helical CT of the abdomen and pelvis was performed without  intravenous contrast. Sagittal and coronal reformatted images were  reviewed.    COMPARISON:  CT abdomen pelvis 2/25/2008    FINDINGS:  " "    The lung bases are clear.    The liver is low in attenuation, suggesting hepatic steatosis.  Otherwise, the noncontrast appearance of the liver, spleen, pancreas  and adrenal glands are unremarkable. The gallbladder is present.    There is mild right hydronephrosis and perinephric stranding. There is  a 5 mm calculus in the urinary bladder, likely a recently passed right  ureteral calculus. No additional renal calculi are seen. The left  kidney is intact.    The bowel is normal in caliber. There has been prior appendectomy.  There are a few diverticula in the colon without evidence of  diverticulitis.     The aorta is normal in size with scattered atherosclerotic  calcifications.    No free fluid, free air or adenopathy is present.      No suspicious osseous lesions are identified. There are degenerative  changes in the spine.      Impression    IMPRESSION:    1. Mild right hydronephrosis and perinephric stranding. 5 mm calculus  in the bladder, likely a recently passed right ureteral stone.  2. Hepatic steatosis.    SOPHIA SAINZ MD   Comprehensive metabolic panel   Result Value Ref Range    Sodium 140 134 - 144 mmol/L    Potassium 3.8 3.5 - 5.1 mmol/L    Chloride 104 98 - 107 mmol/L    Carbon Dioxide 26 21 - 31 mmol/L    Anion Gap 10 3 - 14 mmol/L    Glucose 105 70 - 105 mg/dL    Urea Nitrogen 13 7 - 25 mg/dL    Creatinine 0.87 0.60 - 1.20 mg/dL    GFR Estimate 66 >60 mL/min/1.7m2    GFR Estimate If Black 80 >60 mL/min/1.7m2    Calcium 9.4 8.6 - 10.3 mg/dL    Bilirubin Total 0.7 0.3 - 1.0 mg/dL    Albumin 4.5 3.5 - 5.7 g/dL    Protein Total 7.7 6.4 - 8.9 g/dL    Alkaline Phosphatase 73 34 - 104 U/L    ALT 24 7 - 52 U/L    AST 22 13 - 39 U/L   No Known Allergies  Patient Vitals for the past 24 hrs:   BP Temp Temp src Pulse Resp SpO2 Height Weight   03/12/18 1002 - - - - - 98 % - -   03/12/18 1001 (!) 193/110 - - - - - - -   03/12/18 0925 (!) 211/112 96.6  F (35.9  C) Temporal 86 18 96 % 1.651 m (5' 5\") 97.1 kg " (214 lb)     Results for orders placed or performed during the hospital encounter of 03/12/18   CT Abdomen Pelvis without Contrast (stone protocol)    Narrative    PROCEDURE:  CT ABDOMEN PELVIS W/O CONTRAST    HISTORY:  Abdominal pain;  right flank pain, hypertension.    TECHNIQUE:  Helical CT of the abdomen and pelvis was performed without  intravenous contrast. Sagittal and coronal reformatted images were  reviewed.    COMPARISON:  CT abdomen pelvis 2/25/2008    FINDINGS:      The lung bases are clear.    The liver is low in attenuation, suggesting hepatic steatosis.  Otherwise, the noncontrast appearance of the liver, spleen, pancreas  and adrenal glands are unremarkable. The gallbladder is present.    There is mild right hydronephrosis and perinephric stranding. There is  a 5 mm calculus in the urinary bladder, likely a recently passed right  ureteral calculus. No additional renal calculi are seen. The left  kidney is intact.    The bowel is normal in caliber. There has been prior appendectomy.  There are a few diverticula in the colon without evidence of  diverticulitis.     The aorta is normal in size with scattered atherosclerotic  calcifications.    No free fluid, free air or adenopathy is present.      No suspicious osseous lesions are identified. There are degenerative  changes in the spine.      Impression    IMPRESSION:    1. Mild right hydronephrosis and perinephric stranding. 5 mm calculus  in the bladder, likely a recently passed right ureteral stone.  2. Hepatic steatosis.    SOPHIA SAINZ MD   Comprehensive metabolic panel   Result Value Ref Range    Sodium 140 134 - 144 mmol/L    Potassium 3.8 3.5 - 5.1 mmol/L    Chloride 104 98 - 107 mmol/L    Carbon Dioxide 26 21 - 31 mmol/L    Anion Gap 10 3 - 14 mmol/L    Glucose 105 70 - 105 mg/dL    Urea Nitrogen 13 7 - 25 mg/dL    Creatinine 0.87 0.60 - 1.20 mg/dL    GFR Estimate 66 >60 mL/min/1.7m2    GFR Estimate If Black 80 >60 mL/min/1.7m2    Calcium  9.4 8.6 - 10.3 mg/dL    Bilirubin Total 0.7 0.3 - 1.0 mg/dL    Albumin 4.5 3.5 - 5.7 g/dL    Protein Total 7.7 6.4 - 8.9 g/dL    Alkaline Phosphatase 73 34 - 104 U/L    ALT 24 7 - 52 U/L    AST 22 13 - 39 U/L     Orders Placed This Encounter   Procedures     CBC with platelets differential     Last Lab Result: No results found for: HGB     Standing Status:   Standing     Number of Occurrences:   1     Comprehensive metabolic panel     Standing Status:   Standing     Number of Occurrences:   1     Medications   tamsulosin (FLOMAX) capsule 0.4 mg (0.4 mg Oral Given 3/12/18 1013)   metoclopramide (REGLAN) injection 10 mg (10 mg Intravenous Given 3/12/18 1100)   ketorolac (TORADOL) injection 15 mg (15 mg Intravenous Given 3/12/18 1104)   HYDROmorphone (PF) (DILAUDID) injection 0.5 mg (0.5 mg Intravenous Given 3/12/18 1127)         Assessments & Plan (with Medical Decision Making)     Patient feeling better over the course of her emergency department stay.  I discussed with both Dr. Ardon and Dr. Hedrick.  Given the size of the stone and her level of symptoms I feel it prudent to admit her for pain and nausea control.  Dr. Ardon will consult and Dr. Hedrick agreed to the admission.  Patient verbalized understanding of plan and is in agreement.  New Prescriptions    No medications on file       Final diagnoses:   Calculus of ureter       3/12/2018   [facility]     Nghia Guillen MD  03/12/18 7829

## 2018-03-13 VITALS
TEMPERATURE: 97.7 F | HEIGHT: 65 IN | SYSTOLIC BLOOD PRESSURE: 184 MMHG | BODY MASS INDEX: 34.72 KG/M2 | DIASTOLIC BLOOD PRESSURE: 100 MMHG | HEART RATE: 73 BPM | WEIGHT: 208.4 LBS | RESPIRATION RATE: 18 BRPM | OXYGEN SATURATION: 95 %

## 2018-03-13 LAB
ANION GAP SERPL CALCULATED.3IONS-SCNC: 7 MMOL/L (ref 3–14)
BUN SERPL-MCNC: 8 MG/DL (ref 7–25)
CALCIUM SERPL-MCNC: 8.9 MG/DL (ref 8.6–10.3)
CHLORIDE SERPL-SCNC: 107 MMOL/L (ref 98–107)
CO2 SERPL-SCNC: 28 MMOL/L (ref 21–31)
CREAT SERPL-MCNC: 0.69 MG/DL (ref 0.6–1.2)
ERYTHROCYTE [DISTWIDTH] IN BLOOD BY AUTOMATED COUNT: 12.1 % (ref 10–15)
GFR SERPL CREATININE-BSD FRML MDRD: 87 ML/MIN/1.7M2
GLUCOSE SERPL-MCNC: 106 MG/DL (ref 70–105)
HCT VFR BLD AUTO: 43.5 % (ref 35–47)
HGB BLD-MCNC: 14.1 G/DL (ref 11.7–15.7)
MCH RBC QN AUTO: 28.3 PG (ref 26.5–33)
MCHC RBC AUTO-ENTMCNC: 32.4 G/DL (ref 31.5–36.5)
MCV RBC AUTO: 87 FL (ref 78–100)
PLATELET # BLD AUTO: 194 10E9/L (ref 150–450)
POTASSIUM SERPL-SCNC: 3.5 MMOL/L (ref 3.5–5.1)
RBC # BLD AUTO: 4.98 10E12/L (ref 3.8–5.2)
SODIUM SERPL-SCNC: 142 MMOL/L (ref 134–144)
WBC # BLD AUTO: 4.3 10E9/L (ref 4–11)

## 2018-03-13 PROCEDURE — 36415 COLL VENOUS BLD VENIPUNCTURE: CPT | Performed by: INTERNAL MEDICINE

## 2018-03-13 PROCEDURE — 99239 HOSP IP/OBS DSCHRG MGMT >30: CPT | Performed by: INTERNAL MEDICINE

## 2018-03-13 PROCEDURE — 25000128 H RX IP 250 OP 636: Performed by: INTERNAL MEDICINE

## 2018-03-13 PROCEDURE — 85027 COMPLETE CBC AUTOMATED: CPT | Performed by: INTERNAL MEDICINE

## 2018-03-13 PROCEDURE — 25000132 ZZH RX MED GY IP 250 OP 250 PS 637: Performed by: INTERNAL MEDICINE

## 2018-03-13 PROCEDURE — 80048 BASIC METABOLIC PNL TOTAL CA: CPT | Performed by: INTERNAL MEDICINE

## 2018-03-13 RX ORDER — AMLODIPINE BESYLATE 5 MG/1
5 TABLET ORAL DAILY
Status: DISCONTINUED | OUTPATIENT
Start: 2018-03-13 | End: 2018-03-13 | Stop reason: HOSPADM

## 2018-03-13 RX ORDER — AMLODIPINE BESYLATE 5 MG/1
5 TABLET ORAL DAILY
Qty: 30 TABLET | Refills: 3 | Status: SHIPPED | OUTPATIENT
Start: 2018-03-14 | End: 2018-09-17

## 2018-03-13 RX ADMIN — SODIUM CHLORIDE: 900 INJECTION, SOLUTION INTRAVENOUS at 05:35

## 2018-03-13 RX ADMIN — AMLODIPINE BESYLATE 5 MG: 5 TABLET ORAL at 08:33

## 2018-03-13 ASSESSMENT — PATIENT HEALTH QUESTIONNAIRE - PHQ9: SUM OF ALL RESPONSES TO PHQ QUESTIONS 1-9: 4

## 2018-03-13 NOTE — PHARMACY
[facility]  [facility] of [facility]  [address] [place] [address]    March 13, 2018    Dear Pharmacist,    Your customer, Donna Perea, born on 1955, was recently discharged from [facility].  We have updated her medication list and want to alert you to the following:       Review of your medicines      START taking       Dose / Directions    amLODIPine 5 MG tablet   Commonly known as:  NORVASC        Dose:  5 mg   Start taking on:  3/14/2018   Take 1 tablet (5 mg) by mouth daily   Quantity:  30 tablet   Refills:  3         CONTINUE these medicines which have NOT CHANGED       Dose / Directions    ALEVE 220 MG tablet   Generic drug:  naproxen sodium        Dose:  220 mg   Take 220 mg by mouth 2 times daily as needed for moderate pain   Refills:  0       LACTAID PO        Dose:  3000 Units   Take 3,000 Units by mouth daily with food As needed   Refills:  0            Where to get your medicines      These medications were sent to [id number] - [place], [place] - [address] AT SEC of [facility] [address]  [address], [facility] [address]     Phone:  [phone]      amLODIPine 5 MG tablet             We also reviewed Donna Perea's allergy list and updated it as needed:  Allergies: Review of patient's allergies indicates no known allergies.    Thank you for continuing to care for Donna Perea.  We look forward to working together with you in the future.    Sincerely,  Susi Cisneros  [facility]

## 2018-03-13 NOTE — PHARMACY - DISCHARGE MEDICATION RECONCILIATION AND EDUCATION
Pharmacy:  Discharge Counseling and Medication Reconciliation    Donna Perea  [address] [facility] [address]  [phone] (home)   62 year old female  PCP: No Ref-Primary, Physician    Allergies: Review of patient's allergies indicates no known allergies.    Discharge Counseling:    Pharmacist met with patient (and/or family) today to review the medication portion of the After Visit Summary (with an emphasis on NEW medications) and to address patient's questions/concerns.    Summary of Education: Patient was educated on use, duration and side effects of amlodipine. She was also provided material on the DASH diet. We discussed blood pressure monitoring, goals and exercise.     Materials Provided:  MedCounselor sheets printed from Clinical Pharmacology on: amlodipine     Discharge Medication Reconciliation:    Susi Cisneros has reviewed the patient's discharge medication orders and has compared them to the inpatient medication administration record and to what the patient was taking prior to admission - any discrepancies have been resolved.    It has been determined that the patient has an adequate supply of medications available or which can be obtained from the patient's preferred pharmacy, which HE/SHE has confirmed as: [facility] [An updated medication list will be faxed to the patient's pharmacy.]    Thank you for the consult.    Susi Cisneros........March 13, 2018 9:13 AM

## 2018-03-13 NOTE — PROGRESS NOTES
[facility] DISCHARGE NOTE    Patient discharged to home at 10:10 AM via ambulation. Accompanied by daughter and staff. Discharge instructions reviewed with patient, opportunity offered to ask questions. Prescriptions sent to patients preferred pharmacy. All belongings sent with patient. Discussed follow up appts and establishing a PCP.    Tina Haro

## 2018-03-13 NOTE — DISCHARGE SUMMARY
"[facility]    Discharge Summary  Hospitalist    Date of Admission:  3/12/2018  Date of Discharge:  3/13/2018  Discharging Provider: Rafael Hedrick  Date of Service (when I saw the patient): 03/13/18    Discharge Diagnoses   Principal Problem:    Renal colic (3/12/2018)  Active Problems:    Ureteral stone with hydronephrosis (3/12/2018)    HTN (hypertension) (3/12/2018)      History of Present Illness   Donna Perea is an 62 year old female who presented with right flank pain. Per the H&P:  \"Donna Perea is a 62 year old female who presents with less than 24 hours of right flank pain.  The pain is severe and sharp.  He had an episode like this 2 weeks ago that resolved on its own.  She took ibuprofen last evening and had some relief but this morning the pain was severe and unrelenting.  Nothing she did could make it better.  In the emergency department she was hypertensive as well.  She has no history of hypertension and takes no medication regularly.  Denies fevers or chills.     CT without contrast shows a 5 mm stone at the right ureteral vesicular junction.  Due to her severe and unrelenting pain requiring IV Dilaudid, she was admitted to the hospital for management.\"    Hospital Course   Donna Perea was admitted on 3/12/2018.  The following problems were addressed during her hospitalization:    Kidney stone  The patient was admitted for IV pain medication due to severe pain.  Urology was consulted.  Her urine was strained during her hospital stay.  The evening of her first day in the hospital her pain improved significantly.  She had no recurrent pain overnight.  Stone was retrieved while straining her urine.  It was sent for analysis.  She will follow-up with Dr. Ardon in clinic for discussion on stone composition.    Hypertension  Patient has chronic hypertension.  Her blood pressure was persistently elevated in the hospital.  She was started on amlodipine 5 mg daily.  She will " follow-up in the clinic to establish care with a primary provider for blood pressure management.    # Discharge Pain Plan:   - Patient currently has NO PAIN and is not being prescribed pain medications on discharge.    Rafael Hedrick MD        Pending Results   These results will be followed up by Dr. Ardon  Unresulted Labs Ordered in the Past 30 Days of this Admission     Date and Time Order Name Status Description    3/12/2018 1819 Stone analysis In process           Code Status   Full Code       Primary Care Physician   Physician No Ref-Primary    Physical Exam   Temp: 97.7  F (36.5  C) Temp src: Temporal BP: (!) 180/101 Pulse: 73 Heart Rate: 77 Resp: 18 SpO2: 95 % O2 Device: None (Room air)    Vitals:    03/12/18 0925 03/12/18 1217 03/13/18 0500   Weight: 97.1 kg (214 lb) 94.7 kg (208 lb 12.4 oz) 94.5 kg (208 lb 6.4 oz)     Vital Signs with Ranges  Temp:  [96.6  F (35.9  C)-98.7  F (37.1  C)] 97.7  F (36.5  C)  Pulse:  [73-86] 73  Heart Rate:  [77-78] 77  Resp:  [18-24] 18  BP: (156-211)/() 180/101  SpO2:  [92 %-98 %] 95 %  I/O last 3 completed shifts:  In: 2140 [P.O.:160; I.V.:1980]  Out: 2250 [Urine:2250]    GENERAL: Comfortable, no apparent distress.  CARDIOVASCULAR: regular rate and rhythm, no murmur. No lower extremity edema   RESPIRATORY: Clear to auscultation bilaterally, no wheezes or crackles.  GI: non-tender, non-distended, normal bowel sounds.   SKIN: warm periphery, no rashes      Discharge Disposition   Discharged to home  Condition at discharge: Stable    Consultations This Hospital Stay   None    Time Spent on this Encounter   IRafael, personally saw the patient today and spent greater than 30 minutes discharging this patient.    Discharge Orders     Reason for your hospital stay   Kidney stone     Follow-up and recommended labs and tests   Follow up with Yoselin Kilgore NP on 3/26 at 930 to discuss your blood pressure  Follow up with Dr. Ardon on 3/26  at 1030 for kidney stone  results..     Activity   Your activity upon discharge: activity as tolerated     Discharge Instructions   Strain urine for 3 days, collect any stones and bring to appt.     Full Code     Diet   Follow this diet upon discharge: Orders Placed This Encounter     Regular Diet Adult         Discharge Medications   Current Discharge Medication List      START taking these medications    Details   amLODIPine (NORVASC) 5 MG tablet Take 1 tablet (5 mg) by mouth daily  Qty: 30 tablet, Refills: 3    Associated Diagnoses: Essential hypertension         CONTINUE these medications which have NOT CHANGED    Details   naproxen sodium (ALEVE) 220 MG tablet Take 220 mg by mouth 2 times daily as needed for moderate pain      Lactase (LACTAID PO) Take 3,000 Units by mouth daily with food As needed           Allergies   No Known Allergies  Data   Most Recent 3 CBC's:  Recent Labs   Lab Test  03/13/18   0451  03/12/18   1100   WBC  4.3  7.4   HGB  14.1  15.6   MCV  87  86   PLT  194  174      Most Recent 3 BMP's:  Recent Labs   Lab Test  03/13/18   0451  03/12/18   1100   NA  142  140   POTASSIUM  3.5  3.8   CHLORIDE  107  104   CO2  28  26   BUN  8  13   CR  0.69  0.87   ANIONGAP  7  10   SIOBHAN  8.9  9.4   GLC  106*  105     Most Recent 2 LFT's:  Recent Labs   Lab Test  03/12/18   1100   AST  22   ALT  24   ALKPHOS  73   BILITOTAL  0.7     Most Recent INR's and Anticoagulation Dosing History:  Anticoagulation Dose History     There is no flowsheet data to display.        Most Recent 3 Troponin's:No lab results found.  Most Recent Cholesterol Panel:No lab results found.  Most Recent 6 Bacteria Isolates From Any Culture (See EPIC Reports for Culture Details):No lab results found.  Most Recent TSH, T4 and A1c Labs:No lab results found.  Results for orders placed or performed during the hospital encounter of 03/12/18   CT Abdomen Pelvis without Contrast (stone protocol)    Narrative    PROCEDURE:  CT ABDOMEN PELVIS W/O CONTRAST    HISTORY:   Abdominal pain;  right flank pain, hypertension.    TECHNIQUE:  Helical CT of the abdomen and pelvis was performed without  intravenous contrast. Sagittal and coronal reformatted images were  reviewed.    COMPARISON:  CT abdomen pelvis 2/25/2008    FINDINGS:      The lung bases are clear.    The liver is low in attenuation, suggesting hepatic steatosis.  Otherwise, the noncontrast appearance of the liver, spleen, pancreas  and adrenal glands are unremarkable. The gallbladder is present.    There is mild right hydronephrosis and perinephric stranding. There is  a 5 mm calculus in the urinary bladder, likely a recently passed right  ureteral calculus. No additional renal calculi are seen. The left  kidney is intact.    The bowel is normal in caliber. There has been prior appendectomy.  There are a few diverticula in the colon without evidence of  diverticulitis.     The aorta is normal in size with scattered atherosclerotic  calcifications.    No free fluid, free air or adenopathy is present.      No suspicious osseous lesions are identified. There are degenerative  changes in the spine.      Impression    IMPRESSION:    1. Mild right hydronephrosis and perinephric stranding. 5 mm calculus  in the bladder, likely a recently passed right ureteral stone.  2. Hepatic steatosis.    SOPHIA SAINZ MD   '

## 2018-03-13 NOTE — PLAN OF CARE
Problem: Patient Care Overview  Goal: Plan of Care/Patient Progress Review  Patient has been NPO since midnight incase pain were to start again and need surgery. Patient is also showered. Albina Willis RN 3/13/2018 5:36 AM

## 2018-03-13 NOTE — PLAN OF CARE
Problem: Urine Elimination Impaired (Adult)  Goal: Effective Urinary Elimination  Patient will demonstrate the desired outcomes by discharge/transition of care.   Pt has denied pain. Voiding without difficulty.... Urine clear kya- strained with no calculus noted. Strainers sent with pt for home use for the next few days.

## 2018-03-13 NOTE — PLAN OF CARE
Problem: Patient Care Overview  Goal: Plan of Care/Patient Progress Review  Outcome: No Change  No change since initial assessment at  2145. Second assessment done at 0345. Patient denies any pain and voiding with out difficulty since passing the small brown nandini earlier. Patient says she feels great and wants to go home. Albina Willis RN 3/13/2018 4:48 AM

## 2018-03-13 NOTE — PLAN OF CARE
Problem: Patient Care Overview  Goal: Plan of Care/Patient Progress Review  Dr. Ardon notified of patients improved condition and decided to cancel surgery. Dr. Hedrick and patient will be notified of decision. Patient is eager to go home and is amazed at how well she feels. Albina Willis RN 3/13/2018 6:18 AM

## 2018-03-15 LAB
APPEARANCE STONE: NORMAL
COMPN STONE: NORMAL
NUMBER STONE: NORMAL
SIZE STONE: NORMAL MM
WT STONE: NORMAL MG

## 2018-03-26 ENCOUNTER — OFFICE VISIT (OUTPATIENT)
Dept: FAMILY MEDICINE | Facility: OTHER | Age: 63
End: 2018-03-26
Attending: PHYSICIAN ASSISTANT
Payer: COMMERCIAL

## 2018-03-26 ENCOUNTER — OFFICE VISIT (OUTPATIENT)
Dept: UROLOGY | Facility: OTHER | Age: 63
End: 2018-03-26
Attending: UROLOGY
Payer: COMMERCIAL

## 2018-03-26 VITALS
WEIGHT: 211.6 LBS | DIASTOLIC BLOOD PRESSURE: 86 MMHG | HEIGHT: 65 IN | SYSTOLIC BLOOD PRESSURE: 136 MMHG | BODY MASS INDEX: 35.25 KG/M2 | RESPIRATION RATE: 14 BRPM

## 2018-03-26 VITALS
DIASTOLIC BLOOD PRESSURE: 86 MMHG | SYSTOLIC BLOOD PRESSURE: 142 MMHG | TEMPERATURE: 99 F | WEIGHT: 213.8 LBS | BODY MASS INDEX: 35.58 KG/M2 | HEART RATE: 84 BPM

## 2018-03-26 DIAGNOSIS — N13.2 URETERAL STONE WITH HYDRONEPHROSIS: Primary | ICD-10-CM

## 2018-03-26 DIAGNOSIS — Z87.442 HISTORY OF KIDNEY STONES: ICD-10-CM

## 2018-03-26 DIAGNOSIS — I10 ESSENTIAL HYPERTENSION: Primary | ICD-10-CM

## 2018-03-26 PROBLEM — N23 RENAL COLIC: Status: RESOLVED | Noted: 2018-03-12 | Resolved: 2018-03-26

## 2018-03-26 PROCEDURE — 99213 OFFICE O/P EST LOW 20 MIN: CPT | Performed by: UROLOGY

## 2018-03-26 PROCEDURE — 99213 OFFICE O/P EST LOW 20 MIN: CPT | Performed by: PHYSICIAN ASSISTANT

## 2018-03-26 ASSESSMENT — PAIN SCALES - GENERAL
PAINLEVEL: NO PAIN (0)
PAINLEVEL: NO PAIN (0)

## 2018-03-26 NOTE — PROGRESS NOTES
"Type of Visit  Established    Chief Complaint  History of kidney stones    HPI  Ms. Perea is a 62 year old female with who was previously admitted for severe flank pain and IV pain control due to an obstructing ureteral stone.  She has no history of passed stone episodes.  As an inpatient she eventually passed the stone.  She currently does not follow dietary recommendations for stone prevention.  Today she denies flank pain hematuria or dysuria.      Review of Systems  I reviewed the ROS with the patient.    Nursing Notes:   Barbara Bob LPN  3/26/2018 10:51 AM  Signed  Here for 2 week follow up on stones. Passed stone in the hospital.  Review of Systems:    Weight loss:    No     Recent fever/chills:  No   Night sweats:   No  Current skin rash:  No   Recent hair loss:  No  Heat intolerance:  No   Cold intolerance:  No  Chest pain:   No   Palpitations:   No  Shortness of breath:  No   Wheezing:   No  Constipation:    No   Diarrhea:   No   Nausea:   No   Vomiting:   No   Kidney/side pain:  No   Back pain:   No  Frequent headaches:  No   Dizziness:     No  Leg swelling:   No   Calf pain:    No    Parents, brothers or sisters with history of kidney cancer:   No  Parents, brothers or sisters with history of bladder cancer: No  Father or brother with history of prostate cancer:  No  Barbara Bob LPN on 3/26/2018 at 10:26 AM      Physical Exam  Vitals:    03/26/18 1026   BP: 136/86   BP Location: Right arm   Patient Position: Sitting   Cuff Size: Adult Large   Resp: 14   Weight: 96 kg (211 lb 9.6 oz)   Height: 1.651 m (5' 5\")     Constitutional: NAD, WDWN.   Cardiovascular: Regular rate.  Pulmonary/Chest: Respirations are even and non-labored bilaterally.  Abdominal: Soft. No distension, tenderness, masses or guarding. No CVA tenderness.  Genitourinary: Nonpalpable bladder.  Extremities: RADHA x 4, Warm. No clubbing.  No cyanosis.    Skin: Pink, warm and dry.  No rashes noted.    Labs  Results for orders " placed or performed during the hospital encounter of 03/12/18   CT Abdomen Pelvis without Contrast (stone protocol)    Narrative    PROCEDURE:  CT ABDOMEN PELVIS W/O CONTRAST    HISTORY:  Abdominal pain;  right flank pain, hypertension.    TECHNIQUE:  Helical CT of the abdomen and pelvis was performed without  intravenous contrast. Sagittal and coronal reformatted images were  reviewed.    COMPARISON:  CT abdomen pelvis 2/25/2008    FINDINGS:      The lung bases are clear.    The liver is low in attenuation, suggesting hepatic steatosis.  Otherwise, the noncontrast appearance of the liver, spleen, pancreas  and adrenal glands are unremarkable. The gallbladder is present.    There is mild right hydronephrosis and perinephric stranding. There is  a 5 mm calculus in the urinary bladder, likely a recently passed right  ureteral calculus. No additional renal calculi are seen. The left  kidney is intact.    The bowel is normal in caliber. There has been prior appendectomy.  There are a few diverticula in the colon without evidence of  diverticulitis.     The aorta is normal in size with scattered atherosclerotic  calcifications.    No free fluid, free air or adenopathy is present.      No suspicious osseous lesions are identified. There are degenerative  changes in the spine.      Impression    IMPRESSION:    1. Mild right hydronephrosis and perinephric stranding. 5 mm calculus  in the bladder, likely a recently passed right ureteral stone.  2. Hepatic steatosis.    SOPHIA SAINZ MD   CBC with platelets differential   Result Value Ref Range    WBC 7.4 4.0 - 11.0 10e9/L    RBC Count 5.38 (H) 3.8 - 5.2 10e12/L    Hemoglobin 15.6 11.7 - 15.7 g/dL    Hematocrit 46.4 35.0 - 47.0 %    MCV 86 78 - 100 fl    MCH 29.0 26.5 - 33.0 pg    MCHC 33.6 31.5 - 36.5 g/dL    RDW 12.3 10.0 - 15.0 %    Platelet Count 174 150 - 450 10e9/L    Diff Method Automated Method     % Neutrophils 85.2 %    % Lymphocytes 8.8 %    % Monocytes 5.1 %    %  Eosinophils 0.3 %    % Basophils 0.3 %    % Immature Granulocytes 0.3 %    Absolute Neutrophil 6.3 1.6 - 8.3 10e9/L    Absolute Lymphocytes 0.7 (L) 0.8 - 5.3 10e9/L    Absolute Monocytes 0.4 0.0 - 1.3 10e9/L    Absolute Eosinophils 0.0 0.0 - 0.7 10e9/L    Absolute Basophils 0.0 0.0 - 0.2 10e9/L    Abs Immature Granulocytes 0.0 0 - 0.4 10e9/L   Comprehensive metabolic panel   Result Value Ref Range    Sodium 140 134 - 144 mmol/L    Potassium 3.8 3.5 - 5.1 mmol/L    Chloride 104 98 - 107 mmol/L    Carbon Dioxide 26 21 - 31 mmol/L    Anion Gap 10 3 - 14 mmol/L    Glucose 105 70 - 105 mg/dL    Urea Nitrogen 13 7 - 25 mg/dL    Creatinine 0.87 0.60 - 1.20 mg/dL    GFR Estimate 66 >60 mL/min/1.7m2    GFR Estimate If Black 80 >60 mL/min/1.7m2    Calcium 9.4 8.6 - 10.3 mg/dL    Bilirubin Total 0.7 0.3 - 1.0 mg/dL    Albumin 4.5 3.5 - 5.7 g/dL    Protein Total 7.7 6.4 - 8.9 g/dL    Alkaline Phosphatase 73 34 - 104 U/L    ALT 24 7 - 52 U/L    AST 22 13 - 39 U/L   Magnesium   Result Value Ref Range    Magnesium 1.9 1.9 - 2.7 mg/dL   Basic metabolic panel   Result Value Ref Range    Sodium 142 134 - 144 mmol/L    Potassium 3.5 3.5 - 5.1 mmol/L    Chloride 107 98 - 107 mmol/L    Carbon Dioxide 28 21 - 31 mmol/L    Anion Gap 7 3 - 14 mmol/L    Glucose 106 (H) 70 - 105 mg/dL    Urea Nitrogen 8 7 - 25 mg/dL    Creatinine 0.69 0.60 - 1.20 mg/dL    GFR Estimate 87 >60 mL/min/1.7m2    GFR Estimate If Black >90 >60 mL/min/1.7m2    Calcium 8.9 8.6 - 10.3 mg/dL   CBC with platelets   Result Value Ref Range    WBC 4.3 4.0 - 11.0 10e9/L    RBC Count 4.98 3.8 - 5.2 10e12/L    Hemoglobin 14.1 11.7 - 15.7 g/dL    Hematocrit 43.5 35.0 - 47.0 %    MCV 87 78 - 100 fl    MCH 28.3 26.5 - 33.0 pg    MCHC 32.4 31.5 - 36.5 g/dL    RDW 12.1 10.0 - 15.0 %    Platelet Count 194 150 - 450 10e9/L   EKG 12-lead, tracing only    Narrative    EKG Interpretation:    Rhythm: Normal Sinus   Rate: 77  Axis: Normal  QRS interval: Normal  Conduction: Normal  ST  Segments: Normal  QT interval: Normal  APC's Present: No  VPC's Present: No    Impression: Normal EKG.  No comparison available.    Iwona Ospina, DO  Internal Medicine     Stone analysis   Result Value Ref Range    Stone Composition SEE NOTE     Calculi Number Not Applicable     Calculi Size Not Applicable mm    Calculi Description Not Applicable     Stone Mass SEE NOTE mg       Assessment  Ms. Perea is a 62 year old female with recently passed ureteral stone requiring inpatient admission for pain control.  Clinically she has passed the stone.    Plan  Discussed stone prevention   Increase fluids, low-salt diet, increase citrate and moderate protein  Follow-up as needed

## 2018-03-26 NOTE — NURSING NOTE
Patient presents to the clinic for follow up after being seen in ER for a possible kidney stone.  STEVE Lombardo........................3/26/2018  9:38 AM

## 2018-03-26 NOTE — PROGRESS NOTES
Nursing Notes:   Janneth Puente LPN  3/26/2018 10:05 AM  Signed  Patient presents to the clinic for follow up after being seen in ER for a possible kidney stone.  STEVE Lombardo........................3/26/2018  9:38 AM      HPI:    Donna Perea is a 62 year old female who presents for follow up after being seen in ER for a possible kidney stone.  Patient was admitted on 3/12 to 3/13/2008.  Diagnosed with renal colic.  Active problems were ureteral stone with hydronephrosis and hypertension. CT without contrast shows a 5 mm stone at the right ureteral vesicular junction.    Admitted for IV pain medication due to severe pain.  Urology consulted.  Stone was retrieved while straining her urine.  Recheck today to discuss blood pressure as it was elevated in the emergency room.  Patient has history of chronic hypertension.  She was started on amlodipine 5 mg.  She is tolerating the amlodipine fine.  No side effects noted.  No chest pain, palpitations, problems breathing.  Not lightheaded or dizzy.  She states that she does eat a lot of salt in her diet.  No history of being on blood pressure medications in the past.  No vision or hearing concerns.  No recent eye check.    History reviewed. No pertinent past medical history.    Past Surgical History:   Procedure Laterality Date     OTHER SURGICAL HISTORY      1957,SUR38,APPENDECTOMY OPEN,intussesception and appy       History reviewed. No pertinent family history.    Social History     Social History     Marital status:      Spouse name: N/A     Number of children: N/A     Years of education: N/A     Occupational History     Not on file.     Social History Main Topics     Smoking status: Former Smoker     Quit date: 1/15/1991     Smokeless tobacco: Never Used     Alcohol use No     Drug use: No      Comment: Drug use: Not Asked     Sexual activity: Not on file     Other Topics Concern     Not on file     Social History Narrative    , lives with  daughter and her children.  Works at [employer], full time       Current Outpatient Prescriptions   Medication Sig Dispense Refill     amLODIPine (NORVASC) 5 MG tablet Take 1 tablet (5 mg) by mouth daily 30 tablet 3     naproxen sodium (ALEVE) 220 MG tablet Take 220 mg by mouth 2 times daily as needed for moderate pain       Lactase (LACTAID PO) Take 3,000 Units by mouth daily with food As needed         No Known Allergies    REVIEW OF SYSTEMS:  Refer to HPI.    EXAM:   Vitals:    /86 (BP Location: Right arm, Patient Position: Sitting, Cuff Size: Adult Regular)  Pulse 84  Temp 99  F (37.2  C) (Tympanic)  Wt 213 lb 12.8 oz (97 kg)  Breastfeeding? No  BMI 35.58 kg/m2    General Appearance: Pleasant, alert, appropriate appearance for age. No acute distress  Chest/Respiratory Exam: Normal chest wall and respirations. Clear to auscultation.  Cardiovascular Exam: Regular rate and rhythm. S1, S2, no murmur, click, gallop, or rubs.  Skin: no rash or abnormalities  Psychiatric Exam: Alert and oriented - appropriate affect.    PHQ Depression Screen  PHQ-9 SCORE 3/12/2018   Total Score 4       ASSESSMENT AND PLAN:    1. Essential hypertension        Blood pressure is elevated today. Encouraged to monitor blood pressure a couple times a week over the next few weeks. Return in 1-2 months for recheck appointment. Work on diet and exercise to decrease blood pressure. Weight loss is helpful.  Decrease salt intake.   Encouraged DASH diet.  Encouraged to schedule for physical.  Encouraged to schedule a eye check appointment.      Patient Instructions     Blood pressure is elevated today. Encouraged to monitor blood pressure a couple times a week over the next few weeks. Return in 1-2 months for recheck appointment. Work on diet and exercise to decrease blood pressure. Weight loss is helpful.  Decrease salt intake.           Low-Salt Diet  This diet removes foods that are high in salt. It also limits the amount of salt you use  when cooking. It is most often used for people with high blood pressure, edema (fluid retention), and kidney, liver, or heart disease.  Table salt contains the mineral sodium. Your body needs sodium to work normally. But too much sodium can make your health problems worse. Your healthcare provider is recommending a low-salt (also called low-sodium) diet for you. Your total daily allowance of salt is 1,500 to 2,300 milligrams (mg). It is less than 1 teaspoon of table salt. This means you can have only about 500 to 700 mg of sodium at each meal. People with certain health problems should limit salt intake to the lower end of the recommended range.    When you cook, don t add much salt. If you can cook without using salt, even better. Don t add salt to your food at the table.  When shopping, read food labels. Salt is often called sodium on the label. Choose foods that are salt-free, low salt, or very low salt. Note that foods with reduced salt may not lower your salt intake enough.    Beans, potatoes, and pasta  Ok: Dry beans, split peas, lentils, potatoes, rice, macaroni, pasta, spaghetti without added salt  Avoid: Potato chips, tortilla chips, and similar products  Breads and cereals  Ok: Low-sodium breads, rolls, cereals, and cakes; low-salt crackers, matzo crackers  Avoid: Salted crackers, pretzels, popcorn, Khmer toast, pancakes, muffins  Dairy  Ok: Milk, chocolate milk, hot chocolate mix, low-salt cheeses, and yogurt  Avoid: Processed cheese and cheese spreads; Roquefort, Camembert, and cottage cheese; buttermilk, instant breakfast drink  Desserts  Ok: Ice cream, frozen yogurt, juice bars, gelatin, cookies and pies, sugar, honey, jelly, hard candy  Avoid: Most pies, cakes and cookies prepared or processed with salt; instant pudding  Drinks  Ok: Tea, coffee, fizzy (carbonated) drinks, juices  Avoid: Flavored coffees, electrolyte replacement drinks, sports drinks  Meats  Ok: All fresh meat, fish, poultry, low-salt  tuna, eggs, egg substitute  Avoid: Smoked, pickled, brine-cured, or salted meats and fish. This includes dee, chipped beef, corned beef, hot dogs, deli meats, ham, kosher meats, salt pork, sausage, canned tuna, salted codfish, smoked salmon, herring, sardines, or anchovies.  Seasonings and spices  Ok: Most seasonings are okay. Good substitutes for salt include: fresh herb blends, hot sauce, lemon, garlic, reynolds, vinegar, dry mustard, parsley, cilantro, horseradish, tomato paste, regular margarine, mayonnaise, unsalted butter, cream cheese, vegetable oil, cream, low-salt salad dressing and gravy.  Avoid: Regular ketchup, relishes, pickles, soy sauce, teriyaki sauce, Worcestershire sauce, BBQ sauce, tartar sauce, meat tenderizer, chili sauce, regular gravy, regular salad dressing, salted butter  Soups  Ok: Low-salt soups and broths made with allowed foods  Avoid: Bouillon cubes, soups with smoked or salted meats, regular soup and broth  Vegetables  Ok: Most vegetables are okay; also low-salt tomato and vegetable juices  Avoid: Sauerkraut and other brine-soaked vegetables; pickles and other pickled vegetables; tomato juice, olives  Date Last Reviewed: 8/1/2016 2000-2017 [employer]. [address] [place] [address]. All rights reserved. This information is not intended as a substitute for professional medical care. Always follow your healthcare professional's instructions.        Eating Heart-Healthy Foods  Eating has a big impact on your heart health. In fact, eating healthier can improve several of your heart risks at once. For instance, it helps you manage weight, cholesterol, and blood pressure. Here are ideas to help you make heart-healthy changes without giving up all the foods and flavors you love.  Getting started    Talk with your health care provider about eating plans, such as the DASH or Mediterranean diet. You may also be referred to a dietitian.    Change a few things at a  time. Give yourself time to get used to a few eating changes before adding more.    Work to create a tasty, healthy eating plan that you can stick to for the rest of your life.    Goals for healthy eating  Below are some tips to improve your eating habits:    Limit saturated fats and trans fats. Saturated fats raise your levels of cholesterol, so keep these fats to a minimum. They are found in foods such as fatty meats, whole milk, cheese, and palm and coconut oils. Avoid trans fats because they lower good cholesterol as well as raise bad cholesterol. Trans fats are most often found in processed foods.    Reduce sodium (salt) intake. Eating too much salt may increase your blood pressure. Limit your sodium intake to 2,300 milligrams (mg) per day, or less if your health care provider recommends it. Dining out less often and eating fewer processed foods are two great ways to decrease the amount of salt you consume.    Managing calories. A calorie is a unit of energy. Your body burns calories for fuel, but if you eat more calories than your body burns, the extras are stored as fat. Your health care provider can help you create a diet plan to manage your calories. This will likely include eating healthier foods as well as exercising regularly. To help you track your progress, keep a diary to record what you eat and how often you exercise.  Choose the right foods  Aim to make these foods staples of your diet. If you have diabetes, you may have different recommendations than what is listed here:    Fruits and vegetable provide plenty of nutrients without a lot of calories. At meals, fill half your plate with these foods. Split the other half of your plate between whole grains and lean protein.    Whole grains are high in fiber and rich in vitamins and nutrients. Good choices include whole-wheat bread, pasta, and brown rice.    Lean proteins give you nutrition with less fat. Good choices include fish, skinless chicken, and  beans.    Low-fat or nonfat dairy provides nutrients without a lot of fat. Try low-fat or nonfat milk, cheese, or yogurt.    Healthy fats can be good for you in small amounts. These are unsaturated fats, such as olive oil, nuts, and fish. Try to have at least 2 servings per week of fatty fish such as salmon, sardines, mackerel, rainbow trout, and albacore tuna. These contain omega-3 fatty acids, which are good for your heart. Flaxseed is another source of a heart-healthy fat.  More on heart healthy eating    Read food labels  Healthy eating starts at the grocery store. Be sure to pay attention to food labels on packaged foods. Look for products that are high in fiber and protein, and low in saturated fat, cholesterol, and sodium. Avoid products that contain trans fat. And pay close attention to serving size. For instance, if you plan to eat two servings, double all the numbers on the label.  Prepare food right  A key part of healthy cooking is cutting down on added fat and salt. Look on the internet for lower-fat, lower-sodium recipes. Also, try these tips:    Remove fat from meat and skin from poultry before cooking.    Skim fat from the surface of soups and sauces.    Broil, boil, bake, steam, grill, and microwave food without added fats.    Choose ingredients that spice up your food without adding calories, fat, or sodium. Try these items: horseradish, hot sauce, lemon, mustard, nonfat salad dressings, and vinegar. For salt-free herbs and spices, try basil, cilantro, cinnamon, pepper, and rosemary.  Date Last Reviewed: 6/25/2015 2000-2017 [employer]. [address] [place] [address], [place] [address]. All rights reserved. This information is not intended as a substitute for professional medical care. Always follow your healthcare professional's instructions.        Eating Heart-Healthy Food: Using the DASH Plan    Eating for your heart doesn t have to be hard or boring. You just need to know how to make  healthier choices. The DASH eating plan has been developed to help you do just that. DASH stands for Dietary Approaches to Stop Hypertension. It is a plan that has been proven to be healthier for your heart and to lower your risk for high blood pressure. It can also help lower your risk for cancer, heart disease, osteoporosis, and diabetes.  Choosing from each food group  Choose foods from each of the food groups below each day. Try to get the recommended number of servings for each food group. The serving numbers are based on a diet of 2,000 calories a day. Talk to your doctor if you re unsure about your calorie needs. Along with getting the correct servings, the DASH plan also recommends a sodium intake less than 2,300 mg per day.        Grains  Servings: 6 to 8 a day  A serving is:    1 slice bread    1 ounce dry cereal    Half a cup cooked rice, pasta or cereal  Best choices: Whole grains and any grains high in fiber. Vegetables  Servings: 4 to 5 a day  A serving is:    1 cup raw leafy vegetable    Half a cup cut-up raw or cooked vegetable    Half a cup vegetable juice  Best choices: Fresh or frozen vegetables prepared without added salt or fat.   Fruits  Servings: 4 to 5 a day  A serving is:    1 medium fruit    One-quarter cup dried fruit    Half a cup fresh, frozen, or canned fruit    Half a cup of 100% fruit juices  Best choices: A variety of fresh fruits of different colors. Whole fruits are a better choice than fruit juices. Low-fat or fat-free dairy  Servings: 2 to 3 a day  A serving is:    1 cup milk    1 cup yogurt    One and a half ounces cheese  Best choices: Skim or 1% milk, low-fat or fat-free yogurt or buttermilk, and low-fat cheeses.         Lean meats, poultry, fish  Servings: 6 or fewer a day  A serving is:    1 ounce cooked meats, poultry, or fish    1 egg  Best choices: Lean poultry and fish. Trim away visible fat. Broil, grill, roast, or boil instead of frying. Remove skin from poultry before  eating. Limit how much red meat you eat.  Nuts, seeds, beans  Servings: 4 to 5 a week  A serving is:    One-third cup nuts (one and a half ounces)    2 tablespoons nut butter or seeds    Half a cup cooked dry beans or legumes  Best choices: Dry roasted nuts with no salt added, lentils, kidney beans, garbanzo beans, and whole rebolledo beans.   Fats and oils  Servings: 2 to 3 a day  A serving is:    1 teaspoon vegetable oil    1 teaspoon soft margarine    1 tablespoon mayonnaise    2 tablespoons salad dressing  Best choices: Nut and vegetable oils (nontropical vegetable oils), such as olive and canola oil. Sweets  Servings: 5 a week or fewer  A serving is:    1 tablespoon sugar, maple syrup, or honey    1 tablespoon jam or jelly    1 half-ounce jelly beans (about 15)    1 cup lemonade  Best choices: Dried fruit can be a satisfying sweet. Choose low-fat sweets. And watch your serving sizes!      For more on the DASH eating plan, visit:  www.nhlbi.nih.gov/health/health-topics/topics/dash   Date Last Reviewed: 6/1/2016 2000-2017 [employer]. [address] [place] [address], [place] [address]. All rights reserved. This information is not intended as a substitute for professional medical care. Always follow your healthcare professional's instructions.            Lianna Kilgore..................3/26/2018 10:01 AM

## 2018-03-26 NOTE — PATIENT INSTRUCTIONS
Blood pressure is elevated today. Encouraged to monitor blood pressure a couple times a week over the next few weeks. Return in 1-2 months for recheck appointment. Work on diet and exercise to decrease blood pressure. Weight loss is helpful.  Decrease salt intake.           Low-Salt Diet  This diet removes foods that are high in salt. It also limits the amount of salt you use when cooking. It is most often used for people with high blood pressure, edema (fluid retention), and kidney, liver, or heart disease.  Table salt contains the mineral sodium. Your body needs sodium to work normally. But too much sodium can make your health problems worse. Your healthcare provider is recommending a low-salt (also called low-sodium) diet for you. Your total daily allowance of salt is 1,500 to 2,300 milligrams (mg). It is less than 1 teaspoon of table salt. This means you can have only about 500 to 700 mg of sodium at each meal. People with certain health problems should limit salt intake to the lower end of the recommended range.    When you cook, don t add much salt. If you can cook without using salt, even better. Don t add salt to your food at the table.  When shopping, read food labels. Salt is often called sodium on the label. Choose foods that are salt-free, low salt, or very low salt. Note that foods with reduced salt may not lower your salt intake enough.    Beans, potatoes, and pasta  Ok: Dry beans, split peas, lentils, potatoes, rice, macaroni, pasta, spaghetti without added salt  Avoid: Potato chips, tortilla chips, and similar products  Breads and cereals  Ok: Low-sodium breads, rolls, cereals, and cakes; low-salt crackers, matzo crackers  Avoid: Salted crackers, pretzels, popcorn, [place] toast, pancakes, muffins  Dairy  Ok: Milk, chocolate milk, hot chocolate mix, low-salt cheeses, and yogurt  Avoid: Processed cheese and cheese spreads; Roquefort, Camembert, and cottage cheese; buttermilk, instant breakfast  drink  Desserts  Ok: Ice cream, frozen yogurt, juice bars, gelatin, cookies and pies, sugar, honey, jelly, hard candy  Avoid: Most pies, cakes and cookies prepared or processed with salt; instant pudding  Drinks  Ok: Tea, coffee, fizzy (carbonated) drinks, juices  Avoid: Flavored coffees, electrolyte replacement drinks, sports drinks  Meats  Ok: All fresh meat, fish, poultry, low-salt tuna, eggs, egg substitute  Avoid: Smoked, pickled, brine-cured, or salted meats and fish. This includes dee, chipped beef, corned beef, hot dogs, deli meats, ham, kosher meats, salt pork, sausage, canned tuna, salted codfish, smoked salmon, herring, sardines, or anchovies.  Seasonings and spices  Ok: Most seasonings are okay. Good substitutes for salt include: fresh herb blends, hot sauce, lemon, garlic, reynolds, vinegar, dry mustard, parsley, cilantro, horseradish, tomato paste, regular margarine, mayonnaise, unsalted butter, cream cheese, vegetable oil, cream, low-salt salad dressing and gravy.  Avoid: Regular ketchup, relishes, pickles, soy sauce, teriyaki sauce, Worcestershire sauce, BBQ sauce, tartar sauce, meat tenderizer, chili sauce, regular gravy, regular salad dressing, salted butter  Soups  Ok: Low-salt soups and broths made with allowed foods  Avoid: Bouillon cubes, soups with smoked or salted meats, regular soup and broth  Vegetables  Ok: Most vegetables are okay; also low-salt tomato and vegetable juices  Avoid: Sauerkraut and other brine-soaked vegetables; pickles and other pickled vegetables; tomato juice, olives  Date Last Reviewed: 8/1/2016 2000-2017 [employer]. [address] [place] [id number]. All rights reserved. This information is not intended as a substitute for professional medical care. Always follow your healthcare professional's instructions.        Eating Heart-Healthy Foods  Eating has a big impact on your heart health. In fact, eating healthier can improve several of your heart  risks at once. For instance, it helps you manage weight, cholesterol, and blood pressure. Here are ideas to help you make heart-healthy changes without giving up all the foods and flavors you love.  Getting started    Talk with your health care provider about eating plans, such as the DASH or Mediterranean diet. You may also be referred to a dietitian.    Change a few things at a time. Give yourself time to get used to a few eating changes before adding more.    Work to create a tasty, healthy eating plan that you can stick to for the rest of your life.    Goals for healthy eating  Below are some tips to improve your eating habits:    Limit saturated fats and trans fats. Saturated fats raise your levels of cholesterol, so keep these fats to a minimum. They are found in foods such as fatty meats, whole milk, cheese, and palm and coconut oils. Avoid trans fats because they lower good cholesterol as well as raise bad cholesterol. Trans fats are most often found in processed foods.    Reduce sodium (salt) intake. Eating too much salt may increase your blood pressure. Limit your sodium intake to 2,300 milligrams (mg) per day, or less if your health care provider recommends it. Dining out less often and eating fewer processed foods are two great ways to decrease the amount of salt you consume.    Managing calories. A calorie is a unit of energy. Your body burns calories for fuel, but if you eat more calories than your body burns, the extras are stored as fat. Your health care provider can help you create a diet plan to manage your calories. This will likely include eating healthier foods as well as exercising regularly. To help you track your progress, keep a diary to record what you eat and how often you exercise.  Choose the right foods  Aim to make these foods staples of your diet. If you have diabetes, you may have different recommendations than what is listed here:    Fruits and vegetable provide plenty of nutrients  without a lot of calories. At meals, fill half your plate with these foods. Split the other half of your plate between whole grains and lean protein.    Whole grains are high in fiber and rich in vitamins and nutrients. Good choices include whole-wheat bread, pasta, and brown rice.    Lean proteins give you nutrition with less fat. Good choices include fish, skinless chicken, and beans.    Low-fat or nonfat dairy provides nutrients without a lot of fat. Try low-fat or nonfat milk, cheese, or yogurt.    Healthy fats can be good for you in small amounts. These are unsaturated fats, such as olive oil, nuts, and fish. Try to have at least 2 servings per week of fatty fish such as salmon, sardines, mackerel, rainbow trout, and albacore tuna. These contain omega-3 fatty acids, which are good for your heart. Flaxseed is another source of a heart-healthy fat.  More on heart healthy eating    Read food labels  Healthy eating starts at the grocery store. Be sure to pay attention to food labels on packaged foods. Look for products that are high in fiber and protein, and low in saturated fat, cholesterol, and sodium. Avoid products that contain trans fat. And pay close attention to serving size. For instance, if you plan to eat two servings, double all the numbers on the label.  Prepare food right  A key part of healthy cooking is cutting down on added fat and salt. Look on the internet for lower-fat, lower-sodium recipes. Also, try these tips:    Remove fat from meat and skin from poultry before cooking.    Skim fat from the surface of soups and sauces.    Broil, boil, bake, steam, grill, and microwave food without added fats.    Choose ingredients that spice up your food without adding calories, fat, or sodium. Try these items: horseradish, hot sauce, lemon, mustard, nonfat salad dressings, and vinegar. For salt-free herbs and spices, try basil, cilantro, cinnamon, pepper, and rosemary.  Date Last Reviewed: 6/25/2015     [id number] [employer]. [address] [place] [address]. All rights reserved. This information is not intended as a substitute for professional medical care. Always follow your healthcare professional's instructions.        Eating Heart-Healthy Food: Using the DASH Plan    Eating for your heart doesn t have to be hard or boring. You just need to know how to make healthier choices. The DASH eating plan has been developed to help you do just that. DASH stands for Dietary Approaches to Stop Hypertension. It is a plan that has been proven to be healthier for your heart and to lower your risk for high blood pressure. It can also help lower your risk for cancer, heart disease, osteoporosis, and diabetes.  Choosing from each food group  Choose foods from each of the food groups below each day. Try to get the recommended number of servings for each food group. The serving numbers are based on a diet of 2,000 calories a day. Talk to your doctor if you re unsure about your calorie needs. Along with getting the correct servings, the DASH plan also recommends a sodium intake less than 2,300 mg per day.        Grains  Servings: 6 to 8 a day  A serving is:    1 slice bread    1 ounce dry cereal    Half a cup cooked rice, pasta or cereal  Best choices: Whole grains and any grains high in fiber. Vegetables  Servings: 4 to 5 a day  A serving is:    1 cup raw leafy vegetable    Half a cup cut-up raw or cooked vegetable    Half a cup vegetable juice  Best choices: Fresh or frozen vegetables prepared without added salt or fat.   Fruits  Servings: 4 to 5 a day  A serving is:    1 medium fruit    One-quarter cup dried fruit    Half a cup fresh, frozen, or canned fruit    Half a cup of 100% fruit juices  Best choices: A variety of fresh fruits of different colors. Whole fruits are a better choice than fruit juices. Low-fat or fat-free dairy  Servings: 2 to 3 a day  A serving is:    1 cup milk    1 cup yogurt    One  and a half ounces cheese  Best choices: Skim or 1% milk, low-fat or fat-free yogurt or buttermilk, and low-fat cheeses.         Lean meats, poultry, fish  Servings: 6 or fewer a day  A serving is:    1 ounce cooked meats, poultry, or fish    1 egg  Best choices: Lean poultry and fish. Trim away visible fat. Broil, grill, roast, or boil instead of frying. Remove skin from poultry before eating. Limit how much red meat you eat.  Nuts, seeds, beans  Servings: 4 to 5 a week  A serving is:    One-third cup nuts (one and a half ounces)    2 tablespoons nut butter or seeds    Half a cup cooked dry beans or legumes  Best choices: Dry roasted nuts with no salt added, lentils, kidney beans, garbanzo beans, and whole rebolledo beans.   Fats and oils  Servings: 2 to 3 a day  A serving is:    1 teaspoon vegetable oil    1 teaspoon soft margarine    1 tablespoon mayonnaise    2 tablespoons salad dressing  Best choices: Nut and vegetable oils (nontropical vegetable oils), such as olive and canola oil. Sweets  Servings: 5 a week or fewer  A serving is:    1 tablespoon sugar, maple syrup, or honey    1 tablespoon jam or jelly    1 half-ounce jelly beans (about 15)    1 cup lemonade  Best choices: Dried fruit can be a satisfying sweet. Choose low-fat sweets. And watch your serving sizes!      For more on the DASH eating plan, visit:  www.nhlbi.nih.gov/health/health-topics/topics/dash   Date Last Reviewed: 6/1/2016 2000-2017 [employer]. [address] [place] [address], [place] [address]. All rights reserved. This information is not intended as a substitute for professional medical care. Always follow your healthcare professional's instructions.

## 2018-03-26 NOTE — MR AVS SNAPSHOT
"              After Visit Summary   3/26/2018    Donna Perea    MRN: [id number]           Patient Information     Date Of Birth          1955        Visit Information        Provider Department      3/26/2018 10:30 AM Shola Ardon MD [facility]        Today's Diagnoses     Ureteral stone with hydronephrosis    -  1    History of kidney stones           Follow-ups after your visit        Who to contact     If you have questions or need follow up information about today's clinic visit or your schedule please contact [facility] directly at [phone].  Normal or non-critical lab and imaging results will be communicated to you by Gini[employer]nethart, letter or phone within 4 business days after the clinic has received the results. If you do not hear from us within 7 days, please contact the clinic through [employer] or phone. If you have a critical or abnormal lab result, we will notify you by phone as soon as possible.  Submit refill requests through [employer] or call your pharmacy and they will forward the refill request to us. Please allow 3 business days for your refill to be completed.          Additional Information About Your Visit        MyChart Information     [employer] lets you send messages to your doctor, view your test results, renew your prescriptions, schedule appointments and more. To sign up, go to [email or web address] . Click on \"Log in\" on the left side of the screen, which will take you to the Welcome page. Then click on \"Sign up Now\" on the right side of the page.     You will be asked to enter the access code listed below, as well as some personal information. Please follow the directions to create your username and password.     Your access code is: [email or web address]  Expires: 6/10/2018 12:55 PM     Your access code will  in 90 days. If you need help or a new code, please call your [facility] clinic or [phone].        Care EveryWhere ID     This is your " "Care EveryWhere ID. This could be used by other organizations to access your [facility] medical records  [id number]        Your Vitals Were     Respirations Height BMI (Body Mass Index)             14 1.651 m (5' 5\") 35.21 kg/m2          Blood Pressure from Last 3 Encounters:   03/26/18 136/86   03/26/18 142/86   03/13/18 (!) 184/100    Weight from Last 3 Encounters:   03/26/18 96 kg (211 lb 9.6 oz)   03/26/18 97 kg (213 lb 12.8 oz)   03/13/18 94.5 kg (208 lb 6.4 oz)              Today, you had the following     No orders found for display       Primary Care Provider Fax #    Physician No Ref-Primary [phone]       No address on file        Equal Access to Services     SERJIO CAMILO : Enzo Torres, waaxda luqadaha, qaybta kaalmada giorgioyaalycia, leeann bella . So [facility] [phone].    ATENCIÓN: Si habla español, tiene a means disposición servicios gratuitos de asistencia lingüística. Llame al [phone].    We comply with applicable federal civil rights laws and [place] laws. We do not discriminate on the basis of race, color, national origin, age, disability, sex, sexual orientation, or gender identity.            Thank you!     Thank you for choosing [facility] AND hospitals  for your care. Our goal is always to provide you with excellent care. Hearing back from our patients is one way we can continue to improve our services. Please take a few minutes to complete the written survey that you may receive in the mail after your visit with us. Thank you!             Your Updated Medication List - Protect others around you: Learn how to safely use, store and throw away your medicines at www.disposemymeds.org.          This list is accurate as of 3/26/18 11:28 AM.  Always use your most recent med list.                   Brand Name Dispense Instructions for use Diagnosis    ALEVE 220 MG tablet   Generic drug:  naproxen sodium      Take 220 mg by mouth 2 times daily as " needed for moderate pain        amLODIPine 5 MG tablet    NORVASC    30 tablet    Take 1 tablet (5 mg) by mouth daily    Essential hypertension       LACTAID PO      Take 3,000 Units by mouth daily with food As needed

## 2018-03-26 NOTE — NURSING NOTE
Here for 2 week follow up on stones. Passed stone in the hospital.  Review of Systems:    Weight loss:    No     Recent fever/chills:  No   Night sweats:   No  Current skin rash:  No   Recent hair loss:  No  Heat intolerance:  No   Cold intolerance:  No  Chest pain:   No   Palpitations:   No  Shortness of breath:  No   Wheezing:   No  Constipation:    No   Diarrhea:   No   Nausea:   No   Vomiting:   No   Kidney/side pain:  No   Back pain:   No  Frequent headaches:  No   Dizziness:     No  Leg swelling:   No   Calf pain:    No    Parents, brothers or sisters with history of kidney cancer:   No  Parents, brothers or sisters with history of bladder cancer: No  Father or brother with history of prostate cancer:  No  Barbara Bob LPN on 3/26/2018 at 10:26 AM

## 2018-03-26 NOTE — MR AVS SNAPSHOT
After Visit Summary   3/26/2018    Donna Perea    MRN: [id number]           Patient Information     Date Of Birth          1955        Visit Information        Provider Department      3/26/2018 9:30 AM Lianna Kilgore PA-C [facility]        Care Instructions    Blood pressure is elevated today. Encouraged to monitor blood pressure a couple times a week over the next few weeks. Return in 1-2 months for recheck appointment. Work on diet and exercise to decrease blood pressure. Weight loss is helpful.  Decrease salt intake.           Low-Salt Diet  This diet removes foods that are high in salt. It also limits the amount of salt you use when cooking. It is most often used for people with high blood pressure, edema (fluid retention), and kidney, liver, or heart disease.  Table salt contains the mineral sodium. Your body needs sodium to work normally. But too much sodium can make your health problems worse. Your healthcare provider is recommending a low-salt (also called low-sodium) diet for you. Your total daily allowance of salt is 1,500 to 2,300 milligrams (mg). It is less than 1 teaspoon of table salt. This means you can have only about 500 to 700 mg of sodium at each meal. People with certain health problems should limit salt intake to the lower end of the recommended range.    When you cook, don t add much salt. If you can cook without using salt, even better. Don t add salt to your food at the table.  When shopping, read food labels. Salt is often called sodium on the label. Choose foods that are salt-free, low salt, or very low salt. Note that foods with reduced salt may not lower your salt intake enough.    Beans, potatoes, and pasta  Ok: Dry beans, split peas, lentils, potatoes, rice, macaroni, pasta, spaghetti without added salt  Avoid: Potato chips, tortilla chips, and similar products  Breads and cereals  Ok: Low-sodium breads, rolls, cereals, and cakes; low-salt  crackers, matzo crackers  Avoid: Salted crackers, pretzels, popcorn, Uzbek toast, pancakes, muffins  Dairy  Ok: Milk, chocolate milk, hot chocolate mix, low-salt cheeses, and yogurt  Avoid: Processed cheese and cheese spreads; Roquefort, Camembert, and cottage cheese; buttermilk, instant breakfast drink  Desserts  Ok: Ice cream, frozen yogurt, juice bars, gelatin, cookies and pies, sugar, honey, jelly, hard candy  Avoid: Most pies, cakes and cookies prepared or processed with salt; instant pudding  Drinks  Ok: Tea, coffee, fizzy (carbonated) drinks, juices  Avoid: Flavored coffees, electrolyte replacement drinks, sports drinks  Meats  Ok: All fresh meat, fish, poultry, low-salt tuna, eggs, egg substitute  Avoid: Smoked, pickled, brine-cured, or salted meats and fish. This includes dee, chipped beef, corned beef, hot dogs, deli meats, ham, kosher meats, salt pork, sausage, canned tuna, salted codfish, smoked salmon, herring, sardines, or anchovies.  Seasonings and spices  Ok: Most seasonings are okay. Good substitutes for salt include: fresh herb blends, hot sauce, lemon, garlic, reynolds, vinegar, dry mustard, parsley, cilantro, horseradish, tomato paste, regular margarine, mayonnaise, unsalted butter, cream cheese, vegetable oil, cream, low-salt salad dressing and gravy.  Avoid: Regular ketchup, relishes, pickles, soy sauce, teriyaki sauce, Worcestershire sauce, BBQ sauce, tartar sauce, meat tenderizer, chili sauce, regular gravy, regular salad dressing, salted butter  Soups  Ok: Low-salt soups and broths made with allowed foods  Avoid: Bouillon cubes, soups with smoked or salted meats, regular soup and broth  Vegetables  Ok: Most vegetables are okay; also low-salt tomato and vegetable juices  Avoid: Sauerkraut and other brine-soaked vegetables; pickles and other pickled vegetables; tomato juice, olives  Date Last Reviewed: 8/1/2016 2000-2017 [employer]. [address] [place] [address], [place] [address].  All rights reserved. This information is not intended as a substitute for professional medical care. Always follow your healthcare professional's instructions.        Eating Heart-Healthy Foods  Eating has a big impact on your heart health. In fact, eating healthier can improve several of your heart risks at once. For instance, it helps you manage weight, cholesterol, and blood pressure. Here are ideas to help you make heart-healthy changes without giving up all the foods and flavors you love.  Getting started    Talk with your health care provider about eating plans, such as the DASH or Mediterranean diet. You may also be referred to a dietitian.    Change a few things at a time. Give yourself time to get used to a few eating changes before adding more.    Work to create a tasty, healthy eating plan that you can stick to for the rest of your life.    Goals for healthy eating  Below are some tips to improve your eating habits:    Limit saturated fats and trans fats. Saturated fats raise your levels of cholesterol, so keep these fats to a minimum. They are found in foods such as fatty meats, whole milk, cheese, and palm and coconut oils. Avoid trans fats because they lower good cholesterol as well as raise bad cholesterol. Trans fats are most often found in processed foods.    Reduce sodium (salt) intake. Eating too much salt may increase your blood pressure. Limit your sodium intake to 2,300 milligrams (mg) per day, or less if your health care provider recommends it. Dining out less often and eating fewer processed foods are two great ways to decrease the amount of salt you consume.    Managing calories. A calorie is a unit of energy. Your body burns calories for fuel, but if you eat more calories than your body burns, the extras are stored as fat. Your health care provider can help you create a diet plan to manage your calories. This will likely include eating healthier foods as well as exercising regularly. To  help you track your progress, keep a diary to record what you eat and how often you exercise.  Choose the right foods  Aim to make these foods staples of your diet. If you have diabetes, you may have different recommendations than what is listed here:    Fruits and vegetable provide plenty of nutrients without a lot of calories. At meals, fill half your plate with these foods. Split the other half of your plate between whole grains and lean protein.    Whole grains are high in fiber and rich in vitamins and nutrients. Good choices include whole-wheat bread, pasta, and brown rice.    Lean proteins give you nutrition with less fat. Good choices include fish, skinless chicken, and beans.    Low-fat or nonfat dairy provides nutrients without a lot of fat. Try low-fat or nonfat milk, cheese, or yogurt.    Healthy fats can be good for you in small amounts. These are unsaturated fats, such as olive oil, nuts, and fish. Try to have at least 2 servings per week of fatty fish such as salmon, sardines, mackerel, rainbow trout, and albacore tuna. These contain omega-3 fatty acids, which are good for your heart. Flaxseed is another source of a heart-healthy fat.  More on heart healthy eating    Read food labels  Healthy eating starts at the grocery store. Be sure to pay attention to food labels on packaged foods. Look for products that are high in fiber and protein, and low in saturated fat, cholesterol, and sodium. Avoid products that contain trans fat. And pay close attention to serving size. For instance, if you plan to eat two servings, double all the numbers on the label.  Prepare food right  A key part of healthy cooking is cutting down on added fat and salt. Look on the internet for lower-fat, lower-sodium recipes. Also, try these tips:    Remove fat from meat and skin from poultry before cooking.    Skim fat from the surface of soups and sauces.    Broil, boil, bake, steam, grill, and microwave food without added  fats.    Choose ingredients that spice up your food without adding calories, fat, or sodium. Try these items: horseradish, hot sauce, lemon, mustard, nonfat salad dressings, and vinegar. For salt-free herbs and spices, try basil, cilantro, cinnamon, pepper, and rosemary.  Date Last Reviewed: 6/25/2015 2000-2017 [employer]. [address] [place] [address], [place] [address]. All rights reserved. This information is not intended as a substitute for professional medical care. Always follow your healthcare professional's instructions.        Eating Heart-Healthy Food: Using the DASH Plan    Eating for your heart doesn t have to be hard or boring. You just need to know how to make healthier choices. The DASH eating plan has been developed to help you do just that. DASH stands for Dietary Approaches to Stop Hypertension. It is a plan that has been proven to be healthier for your heart and to lower your risk for high blood pressure. It can also help lower your risk for cancer, heart disease, osteoporosis, and diabetes.  Choosing from each food group  Choose foods from each of the food groups below each day. Try to get the recommended number of servings for each food group. The serving numbers are based on a diet of 2,000 calories a day. Talk to your doctor if you re unsure about your calorie needs. Along with getting the correct servings, the DASH plan also recommends a sodium intake less than 2,300 mg per day.        Grains  Servings: 6 to 8 a day  A serving is:    1 slice bread    1 ounce dry cereal    Half a cup cooked rice, pasta or cereal  Best choices: Whole grains and any grains high in fiber. Vegetables  Servings: 4 to 5 a day  A serving is:    1 cup raw leafy vegetable    Half a cup cut-up raw or cooked vegetable    Half a cup vegetable juice  Best choices: Fresh or frozen vegetables prepared without added salt or fat.   Fruits  Servings: 4 to 5 a day  A serving is:    1 medium fruit    One-quarter cup  dried fruit    Half a cup fresh, frozen, or canned fruit    Half a cup of 100% fruit juices  Best choices: A variety of fresh fruits of different colors. Whole fruits are a better choice than fruit juices. Low-fat or fat-free dairy  Servings: 2 to 3 a day  A serving is:    1 cup milk    1 cup yogurt    One and a half ounces cheese  Best choices: Skim or 1% milk, low-fat or fat-free yogurt or buttermilk, and low-fat cheeses.         Lean meats, poultry, fish  Servings: 6 or fewer a day  A serving is:    1 ounce cooked meats, poultry, or fish    1 egg  Best choices: Lean poultry and fish. Trim away visible fat. Broil, grill, roast, or boil instead of frying. Remove skin from poultry before eating. Limit how much red meat you eat.  Nuts, seeds, beans  Servings: 4 to 5 a week  A serving is:    One-third cup nuts (one and a half ounces)    2 tablespoons nut butter or seeds    Half a cup cooked dry beans or legumes  Best choices: Dry roasted nuts with no salt added, lentils, kidney beans, garbanzo beans, and whole rebolledo beans.   Fats and oils  Servings: 2 to 3 a day  A serving is:    1 teaspoon vegetable oil    1 teaspoon soft margarine    1 tablespoon mayonnaise    2 tablespoons salad dressing  Best choices: Nut and vegetable oils (nontropical vegetable oils), such as olive and canola oil. Sweets  Servings: 5 a week or fewer  A serving is:    1 tablespoon sugar, maple syrup, or honey    1 tablespoon jam or jelly    1 half-ounce jelly beans (about 15)    1 cup lemonade  Best choices: Dried fruit can be a satisfying sweet. Choose low-fat sweets. And watch your serving sizes!      For more on the DASH eating plan, visit:  www.nhlbi.nih.gov/health/health-topics/topics/dash   Date Last Reviewed: 6/1/2016 2000-2017 [employer]. [address] [place] [address], [place] [address]. All rights reserved. This information is not intended as a substitute for professional medical care. Always follow your healthcare  "professional's instructions.                Follow-ups after your visit        Your next 10 appointments already scheduled     Mar 26, 2018 10:30 AM CDT   Return Visit with Shola Ardon MD   [facility]    [address]  [place] [id number] [phone]              Who to contact     If you have questions or need follow up information about today's clinic visit or your schedule please contact [facility] directly at [phone].  Normal or non-critical lab and imaging results will be communicated to you by OANDAhart, letter or phone within 4 business days after the clinic has received the results. If you do not hear from us within 7 days, please contact the clinic through [employer] or phone. If you have a critical or abnormal lab result, we will notify you by phone as soon as possible.  Submit refill requests through [employer] or call your pharmacy and they will forward the refill request to us. Please allow 3 business days for your refill to be completed.          Additional Information About Your Visit        [employer] Information     [employer] lets you send messages to your doctor, view your test results, renew your prescriptions, schedule appointments and more. To sign up, go to [email or web address] . Click on \"Log in\" on the left side of the screen, which will take you to the Welcome page. Then click on \"Sign up Now\" on the right side of the page.     You will be asked to enter the access code listed below, as well as some personal information. Please follow the directions to create your username and password.     Your access code is: [email or web address]  Expires: 6/10/2018 12:55 PM     Your access code will  in 90 days. If you need help or a new code, please call your [facility] clinic or [phone].        Care EveryWhere ID     This is your Care EveryWhere ID. This could be used by other organizations to access your " [facility] medical records  [id number]        Your Vitals Were     Pulse Temperature Breastfeeding? BMI (Body Mass Index)          84 99  F (37.2  C) (Tympanic) No 35.58 kg/m2         Blood Pressure from Last 3 Encounters:   03/26/18 142/86   03/13/18 (!) 184/100   03/12/18 (!) 212/120    Weight from Last 3 Encounters:   03/26/18 213 lb 12.8 oz (97 kg)   03/13/18 208 lb 6.4 oz (94.5 kg)   03/12/18 213 lb 12.8 oz (97 kg)              Today, you had the following     No orders found for display       Primary Care Provider Fax #    Physician No Ref-Primary [phone]       No address on file        Equal Access to Services     SERJIO CAMILO : Enzo Torres, waaxda luqadaha, qaybta kaalmada adechris, leeann montoya. So [facility] [phone].    ATENCIÓN: Si habla español, tiene a means disposición servicios gratuitos de asistencia lingüística. Llame al [phone].    We comply with applicable federal civil rights laws and [place] laws. We do not discriminate on the basis of race, color, national origin, age, disability, sex, sexual orientation, or gender identity.            Thank you!     Thank you for choosing [facility]  for your care. Our goal is always to provide you with excellent care. Hearing back from our patients is one way we can continue to improve our services. Please take a few minutes to complete the written survey that you may receive in the mail after your visit with us. Thank you!             Your Updated Medication List - Protect others around you: Learn how to safely use, store and throw away your medicines at www.disposemymeds.org.          This list is accurate as of 3/26/18 10:16 AM.  Always use your most recent med list.                   Brand Name Dispense Instructions for use Diagnosis    ALEVE 220 MG tablet   Generic drug:  naproxen sodium      Take 220 mg by mouth 2 times daily as needed for moderate pain        amLODIPine 5 MG  tablet    NORVASC    30 tablet    Take 1 tablet (5 mg) by mouth daily    Essential hypertension       LACTAID PO      Take 3,000 Units by mouth daily with food As needed

## 2018-06-13 ENCOUNTER — HOSPITAL ENCOUNTER (EMERGENCY)
Facility: OTHER | Age: 63
Discharge: HOME | End: 2018-06-13
Attending: FAMILY MEDICINE | Admitting: FAMILY MEDICINE
Payer: OTHER MISCELLANEOUS

## 2018-06-13 VITALS
WEIGHT: 200 LBS | TEMPERATURE: 97.6 F | HEART RATE: 88 BPM | SYSTOLIC BLOOD PRESSURE: 152 MMHG | HEIGHT: 66 IN | BODY MASS INDEX: 32.14 KG/M2 | DIASTOLIC BLOOD PRESSURE: 99 MMHG | OXYGEN SATURATION: 96 %

## 2018-06-13 DIAGNOSIS — M67.922 TENDINOPATHY OF LEFT BICEPS TENDON: ICD-10-CM

## 2018-06-13 PROCEDURE — 99282 EMERGENCY DEPT VISIT SF MDM: CPT | Performed by: FAMILY MEDICINE

## 2018-06-13 PROCEDURE — 99282 EMERGENCY DEPT VISIT SF MDM: CPT | Mod: Z6 | Performed by: FAMILY MEDICINE

## 2018-06-13 NOTE — ED AVS SNAPSHOT
[facility]    [email or web address]    [place] [address]    Phone:  [phone]    Fax:  [phone]                                       Donna Perea   MRN: [id number]    Department:  [facility]   Date of Visit:  6/13/2018           Patient Information     Date Of Birth          1955        Your diagnoses for this visit were:     Tendinopathy of left biceps tendon        You were seen by Nghia Guillen MD.      Follow-up Information     Follow up with No Ref-Primary, Physician.    Why:  As needed        Follow up with [facility].    Specialty:  EMERGENCY MEDICINE    Why:  If symptoms worsen    Contact information:    [email or web address]  [place] [address] [phone]      24 Hour Appointment Hotline     To schedule an appointment at [place], please call [phone]. If you don't have a family doctor or clinic, we will help you find one. [place] clinics are conveniently located to serve the needs of you and your family.           Review of your medicines      Our records show that you are taking the medicines listed below. If these are incorrect, please call your family doctor or clinic.        Dose / Directions Last dose taken    ALEVE 220 MG tablet   Dose:  220 mg   Generic drug:  naproxen sodium        Take 220 mg by mouth 2 times daily as needed for moderate pain   Refills:  0        amLODIPine 5 MG tablet   Commonly known as:  NORVASC   Dose:  5 mg   Quantity:  30 tablet        Take 1 tablet (5 mg) by mouth daily   Refills:  3        LACTAID PO   Dose:  3000 Units        Take 3,000 Units by mouth daily with food As needed   Refills:  0                Orders Needing Specimen Collection     None      Pending Results     No orders found from 6/11/2018 to 6/14/2018.            Pending Culture Results     No orders found from 6/11/2018 to 6/14/2018.            Pending Results Instructions     If you had any lab  "results that were not finalized at the time of your Discharge, you can call the ED Lab Result RN at [phone]. You will be contacted by this team for any positive Lab results or changes in treatment. The nurses are available 7 days a week from 10A to 6:30P.  You can leave a message 24 hours per day and they will return your call.        Thank you for choosing [facility]       Thank you for choosing [facility] for your care. Our goal is always to provide you with excellent care. Hearing back from our patients is one way we can continue to improve our services. Please take a few minutes to complete the written survey that you may receive in the mail after you visit with us. Thank you!        [employer] Information     [employer] lets you send messages to your doctor, view your test results, renew your prescriptions, schedule appointments and more. To sign up, go to [email or web address] . Click on \"Log in\" on the left side of the screen, which will take you to the Welcome page. Then click on \"Sign up Now\" on the right side of the page.     You will be asked to enter the access code listed below, as well as some personal information. Please follow the directions to create your username and password.     Your access code is: [email or web address]  Expires: 2018 12:28 PM     Your access code will  in 90 days. If you need help or a new code, please call your [facility] clinic or [phone].        Care EveryWhere ID     This is your Care EveryWhere ID. This could be used by other organizations to access your [facility] medical records  [id number]        Equal Access to Services     [facility]: Hadii beni Torres, waaxda luqadaha, qaybta kaalmaleeann terry . So [facility] [phone].    ATENCIÓN: Si habla español, tiene a means disposición servicios gratuitos de asistencia lingüística. Llame al [phone].    We comply with applicable federal civil rights laws and [place] " laws. We do not discriminate on the basis of race, color, national origin, age, disability, sex, sexual orientation, or gender identity.            After Visit Summary       This is your record. Keep this with you and show to your community pharmacist(s) and doctor(s) at your next visit.

## 2018-06-13 NOTE — LETTER
[facility]  [address]  [place] [address]          June 13, 2018    RE:  Donna Perea                                                                                                                                                       [address] [address]            To whom it may concern:    Donna Perea is under my professional care on 06/13/18   She  may return to work with the following: The employee is ABLE to return to work today.    When the patient returns to work, the following restrictions apply for 72 hours:  A) Bend: Frequently (4-8 hours)  B) Squat: Frequently (4-8 hours)  C) Walk/Stand: Frequently (4-8 hours)  D) Reach Above Shoulders: Occasionally (1-3 hours)  E) Lift, carry, push, and pull no more than:  0-10 lbs.Light duty-unable to lift more than 10 pounds    Sincerely,  Nghia Guillen MD on 6/13/2018 at 11:41 AM

## 2018-06-13 NOTE — ED PROVIDER NOTES
"  History   No chief complaint on file.    HPI  Donna Perea is a 63 year old female who presents to the ER with left upper arm pain that radiates to the left elbow.  Happened repetitively lifting heavy items at work, there was no specific injury it just happened over a period of a shift.  Reviewed nurse's notes below, similar history related to me.      Thinks she hurt her left elbow at work last week lifting the usual large items.  Aleve took away the pain on Friday and she thinks she over did it again as it hurts only when she uses it.                Problem List:    Patient Active Problem List    Diagnosis Date Noted     History of kidney stones 03/26/2018     Priority: Medium     Essential hypertension 03/26/2018     Priority: Medium        Past Medical History:    No past medical history on file.    Past Surgical History:    Past Surgical History:   Procedure Laterality Date     OTHER SURGICAL HISTORY      1955,SUR38,APPENDECTOMY OPEN,intussesception and appy       Family History:    No family history on file.    Social History:  Marital Status:   [4]  Social History   Substance Use Topics     Smoking status: Former Smoker     Quit date: 1/15/1991     Smokeless tobacco: Never Used     Alcohol use No        Medications:      amLODIPine (NORVASC) 5 MG tablet   Lactase (LACTAID PO)   naproxen sodium (ALEVE) 220 MG tablet         Review of Systems   Constitutional: Negative for chills and fever.   HENT: Negative for congestion.    Eyes: Negative for photophobia.   Respiratory: Negative for cough and shortness of breath.    Cardiovascular: Negative for chest pain.   Endocrine: Negative for polyuria.   Genitourinary: Negative for dysuria.   Neurological: Negative for light-headedness and headaches.   Hematological: Negative for adenopathy.       Physical Exam   BP: (!) 152/99  Pulse: 88  Temp: 97.6  F (36.4  C)  Height: 167.6 cm (5' 6\")  Weight: 90.7 kg (200 lb)  SpO2: 96 %      Physical Exam "   Cardiovascular: Normal rate.    No murmur heard.  Pulmonary/Chest: Effort normal and breath sounds normal.   Musculoskeletal: She exhibits tenderness. She exhibits no edema.        Arms:  Nursing note and vitals reviewed.      ED Course     ED Course     Procedures        No results found for this or any previous visit (from the past 24 hour(s)).    Medications - No data to display    Assessments & Plan (with Medical Decision Making)   Specific injury, benign clinical exam, imaging not indicated at this point, recommend rest, naproxen, ice elevation.  Follow-up with physical therapy if not improving.  Some relative rest work restrictions were provided.    New Prescriptions    No medications on file       Final diagnoses:   Tendinopathy of left biceps tendon       6/13/2018   [facility]     Nghia Guillen MD  06/14/18 1794

## 2018-06-13 NOTE — ED TRIAGE NOTES
Thinks she hurt her left elbow at work last week lifting the usual large items.  Aleve took away the pain on Friday and she thinks she over did it again as it hurts only when she uses it.

## 2018-06-13 NOTE — ED AVS SNAPSHOT
[facility]    [address]    [place] [address]    Phone:  [phone]    Fax:  [phone]                                       Donna Perea   MRN: [id number]    Department:  [facility]   Date of Visit:  6/13/2018           After Visit Summary Signature Page     I have received my discharge instructions, and my questions have been answered. I have discussed any challenges I see with this plan with the nurse or doctor.    ..........................................................................................................................................  Patient/Patient Representative Signature      ..........................................................................................................................................  Patient Representative Print Name and Relationship to Patient    ..................................................               ................................................  Date                                            Time    ..........................................................................................................................................  Reviewed by Signature/Title    ...................................................              ..............................................  Date                                                            Time

## 2018-06-14 ASSESSMENT — ENCOUNTER SYMPTOMS
SHORTNESS OF BREATH: 0
CHILLS: 0
ADENOPATHY: 0
DYSURIA: 0
PHOTOPHOBIA: 0
HEADACHES: 0
LIGHT-HEADEDNESS: 0
FEVER: 0
COUGH: 0

## 2018-09-07 ENCOUNTER — HOSPITAL ENCOUNTER (EMERGENCY)
Facility: OTHER | Age: 63
Discharge: HOME | End: 2018-09-07
Attending: EMERGENCY MEDICINE | Admitting: EMERGENCY MEDICINE
Payer: OTHER MISCELLANEOUS

## 2018-09-07 VITALS
SYSTOLIC BLOOD PRESSURE: 156 MMHG | OXYGEN SATURATION: 94 % | HEART RATE: 87 BPM | RESPIRATION RATE: 18 BRPM | DIASTOLIC BLOOD PRESSURE: 101 MMHG | HEIGHT: 65 IN

## 2018-09-07 DIAGNOSIS — M75.22 BICEPS TENDONITIS, LEFT: ICD-10-CM

## 2018-09-07 PROCEDURE — 36415 COLL VENOUS BLD VENIPUNCTURE: CPT

## 2018-09-07 PROCEDURE — 99000 SPECIMEN HANDLING OFFICE-LAB: CPT

## 2018-09-07 PROCEDURE — 99282 EMERGENCY DEPT VISIT SF MDM: CPT | Performed by: EMERGENCY MEDICINE

## 2018-09-07 PROCEDURE — 99282 EMERGENCY DEPT VISIT SF MDM: CPT | Mod: Z6 | Performed by: EMERGENCY MEDICINE

## 2018-09-07 ASSESSMENT — ENCOUNTER SYMPTOMS
CHILLS: 0
ARTHRALGIAS: 1
NAUSEA: 0
DYSURIA: 0
LIGHT-HEADEDNESS: 0
VOMITING: 0
AGITATION: 0
FEVER: 0
SHORTNESS OF BREATH: 0

## 2018-09-07 NOTE — DISCHARGE INSTRUCTIONS
Understanding Biceps Tendonitis (Distal)    The biceps is the muscle on the front of the upper arm. Biceps tendons are connective tissue that attaches this muscle to the bones of the shoulder and arm. Overuse or a sudden injury to tendons can cause pain. When this problem occurs at the inner elbow, it is known as distal biceps tendonitis. Distal refers to the elbow end of the biceps, because it is farther away from center of the body.  Causes of biceps tendonitis  The biceps tendons can be damaged by:    Lifting too heavy a weight    Using your arms more than usual, such as training harder for a sport or doing a task that requires a lot of lifting    Low-level stress on the tendons over time, especially repetitive motions    Other injuries to the arm or elbow  Symptoms of biceps tendonitis  These may include:    Pain or tenderness at the front of the elbow    Pain that gets worse when bending the elbow or rotating the forearm    Arm weakness    A crackling sound or grating feeling when moving the elbow  Treatment for biceps tendonitis  This problem often gets better with rest and medicines. Treatments aim to reduce pain and help the tendon heal. Possible treatments include:    Avoiding actions that make the pain worse to allow the elbow to rest    Using over-the-counter or prescription pain medicine to help relieve pain and swelling    Applying cold packs to help relieve pain and swelling    Trying stretches and other exercises to help with range of motion and strength  If these treatments don t do enough to relieve symptoms, physical therapy may be helpful. For severe symptoms, or if the tendon ruptures, your healthcare provider may advise surgery to repair the tendon.       When to call your healthcare provider  Call your healthcare provider right away if you have any of these:    Fever of 100.4 F (38 C) or higher, or as directed    Symptoms that don t get better, or get worse    New symptoms    Bruising or  swelling of the injured arm    Bulging appearance to the biceps muscle on the injured arm   Date Last Reviewed: 3/10/2016    0516-2839 [employer]. [address] [place] [address], [place] [address]. All rights reserved. This information is not intended as a substitute for professional medical care. Always follow your healthcare professional's instructions.

## 2018-09-07 NOTE — ED AVS SNAPSHOT
[facility]    [address]    [place] [address]    Phone:  [phone]    Fax:  [phone]                                       Donna Perea   MRN: [id number]    Department:  [facility]   Date of Visit:  9/7/2018           After Visit Summary Signature Page     I have received my discharge instructions, and my questions have been answered. I have discussed any challenges I see with this plan with the nurse or doctor.    ..........................................................................................................................................  Patient/Patient Representative Signature      ..........................................................................................................................................  Patient Representative Print Name and Relationship to Patient    ..................................................               ................................................  Date                                            Time    ..........................................................................................................................................  Reviewed by Signature/Title    ...................................................              ..............................................  Date                                                            Time          22EPIC Rev 08/18

## 2018-09-07 NOTE — ED PROVIDER NOTES
History     Chief Complaint   Patient presents with     Joint Swelling     Elbow pain- post accident     Patient is a 63 year old female presenting with arm injury. The history is provided by the patient.   Arm Injury   Associated symptoms: no fever      Donna Perea is a 63 year old female who is here from work with left arm pain.  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  She does a lot of moving of sometimes heavy objects.  Currently they have a lot of deer stands which can be 95 pounds that she has been moving.  For the last few days she has had pain in her left elbow area whenever she lifts anything.  There is no pain now.  It is not keeping her awake or bothering her when she is at home.  She had a similar pain last spring which seemed to resolve and has not been bothering her much since.  There is no swelling.  No actual trauma.  She points to the antecubital area perhaps more medially.  No numbness tingling weakness    Problem List:    Patient Active Problem List    Diagnosis Date Noted     History of kidney stones 03/26/2018     Priority: Medium     Essential hypertension 03/26/2018     Priority: Medium        Past Medical History:    No past medical history on file.    Past Surgical History:    Past Surgical History:   Procedure Laterality Date     OTHER SURGICAL HISTORY      1957,SUR38,APPENDECTOMY OPEN,intussesception and appy       Family History:    No family history on file.    Social History:  Marital Status:   [4]  Social History   Substance Use Topics     Smoking status: Former Smoker     Quit date: 1/15/1991     Smokeless tobacco: Never Used     Alcohol use No        Medications:      amLODIPine (NORVASC) 5 MG tablet   Lactase (LACTAID PO)   naproxen sodium (ALEVE) 220 MG tablet         Review of Systems   Constitutional: Negative for chills and fever.   HENT: Negative for congestion.    Eyes: Negative for visual disturbance.   Respiratory: Negative for  "shortness of breath.    Cardiovascular: Negative for chest pain.   Gastrointestinal: Negative for nausea and vomiting.   Genitourinary: Negative for dysuria.   Musculoskeletal: Positive for arthralgias.   Skin: Negative for rash.   Neurological: Negative for light-headedness.   Psychiatric/Behavioral: Negative for agitation.       Physical Exam   BP: (!) 156/101  Pulse: 87  Resp: 18  Height: 165.1 cm (5' 5\")  SpO2: 94 %      Physical Exam   Constitutional: She is oriented to person, place, and time. She appears well-developed and well-nourished. No distress.   HENT:   Head: Normocephalic and atraumatic.   Eyes: Conjunctivae are normal.   Neck: Neck supple.   Cardiovascular: Normal rate.    Pulmonary/Chest: Effort normal.   Musculoskeletal:   Patient has some tenderness to palpation in the proximal medial biceps tendon.  No tenderness over the medial or lateral epicondyles or the olecranon process area.  Full range of motion.  Neurovascularly intact.   Neurological: She is alert and oriented to person, place, and time.   Skin: Skin is warm and dry. She is not diaphoretic.   Psychiatric: She has a normal mood and affect. Her behavior is normal.   Nursing note and vitals reviewed.      ED Course     ED Course     Procedures                 No results found for this or any previous visit (from the past 24 hour(s)).    Medications - No data to display    Assessments & Plan (with Medical Decision Making)     I have reviewed the nursing notes.    I have reviewed the findings, diagnosis, plan and need for follow up with the patient.  This appears to be an overuse, tendinitis type injury of the proximal biceps.  She does use Aleve and this does seem to help.  I told her she should continue this.  She needs to avoid heavy lifting for little while and she is given a note for this.  I also put in referral for physical therapy and made an appointment for 2 weeks from now to follow-up in clinic.  Patient also states that she does " not follow-up in the clinic and has not been in to see  in a number of years.  I recommended following up with primary care for this as well.    New Prescriptions    No medications on file       Final diagnoses:   Biceps tendonitis, left - distal, medial       9/7/2018   [facility]     Tae Echeverria MD  09/07/18 [id number]

## 2018-09-07 NOTE — ED NOTES
Pt reports left inner elbow/upper forearm pain since June from lifting. Was on light duty for a while since her initial injury in June, lately has been lifting heavy deer stands and is having the burning pain again. No numbness/tingling/swelling.

## 2018-09-07 NOTE — ED TRIAGE NOTES
"Pt was at work in June and injured left elbow, was seen and told to follow up. 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    Only came to the ER for the Post accident Drug screen and then will go to [facility] clinic for elbow pain  "

## 2018-09-07 NOTE — ED AVS SNAPSHOT
[facility]    [address]    [place] [address]    Phone:  [phone]    Fax:  [phone]                                       Donna Perea   MRN: [id number]    Department:  [facility]   Date of Visit:  9/7/2018           Patient Information     Date Of Birth          1955        Your diagnoses for this visit were:     Biceps tendonitis, left distal, medial       You were seen by Tae Echeverria MD.        Discharge Instructions         Understanding Biceps Tendonitis (Distal)    The biceps is the muscle on the front of the upper arm. Biceps tendons are connective tissue that attaches this muscle to the bones of the shoulder and arm. Overuse or a sudden injury to tendons can cause pain. When this problem occurs at the inner elbow, it is known as distal biceps tendonitis. Distal refers to the elbow end of the biceps, because it is farther away from center of the body.  Causes of biceps tendonitis  The biceps tendons can be damaged by:    Lifting too heavy a weight    Using your arms more than usual, such as training harder for a sport or doing a task that requires a lot of lifting    Low-level stress on the tendons over time, especially repetitive motions    Other injuries to the arm or elbow  Symptoms of biceps tendonitis  These may include:    Pain or tenderness at the front of the elbow    Pain that gets worse when bending the elbow or rotating the forearm    Arm weakness    A crackling sound or grating feeling when moving the elbow  Treatment for biceps tendonitis  This problem often gets better with rest and medicines. Treatments aim to reduce pain and help the tendon heal. Possible treatments include:    Avoiding actions that make the pain worse to allow the elbow to rest    Using over-the-counter or prescription pain medicine to help relieve pain and swelling    Applying cold packs to help relieve pain and swelling    Trying stretches and  other exercises to help with range of motion and strength  If these treatments don t do enough to relieve symptoms, physical therapy may be helpful. For severe symptoms, or if the tendon ruptures, your healthcare provider may advise surgery to repair the tendon.       When to call your healthcare provider  Call your healthcare provider right away if you have any of these:    Fever of 100.4 F (38 C) or higher, or as directed    Symptoms that don t get better, or get worse    New symptoms    Bruising or swelling of the injured arm    Bulging appearance to the biceps muscle on the injured arm   Date Last Reviewed: 3/10/2016    0830-8054 [employer]. [address] [place] [address]. All rights reserved. This information is not intended as a substitute for professional medical care. Always follow your healthcare professional's instructions.          Your next 10 appointments already scheduled     Sep 17, 2018  8:00 AM CDT   Office Visit with Stevan Baker MD   [facility] ([facility])    [address]  [place] [id number] [phone]           Bring a current list of meds and any records pertaining to this visit. For Physicals, please bring immunization records and any forms needing to be filled out. Please arrive 10 minutes early to complete paperwork.              24 Hour Appointment Hotline     To schedule an appointment at [place], please call [phone]. If you don't have a family doctor or clinic, we will help you find one. [facility] clinics are conveniently located to serve the needs of you and your family.        ED Discharge Orders     PHYSICAL THERAPY REFERRAL       *This therapy referral will be filtered to a centralized scheduling office at [facility] and the patient will receive a call to schedule an appointment at a [facility] location most convenient for them. *     [facility]  "Services provides Physical Therapy evaluation and treatment and many specialty services across the [facility] system.  If requesting a specialty program, please choose from the list below.    If you have not heard from the scheduling office within 2 business days, please call [phone] for all locations, with the exception of [place], please call [phone] and [place], please call [phone]  Treatment: Evaluation & Treatment  Special Instructions/Modalities: eval and treat      Please be aware that coverage of these services is subject to the terms and limitations of your health insurance plan.  Call member services at your health plan with any benefit or coverage questions.      **Note to Provider:  If you are referring outside of [facility] for the therapy appointment, please list the name of the location in the \"special instructions\" above, print the referral and give to the patient to schedule the appointment.                     Review of your medicines      Our records show that you are taking the medicines listed below. If these are incorrect, please call your family doctor or clinic.        Dose / Directions Last dose taken    ALEVE 220 MG tablet   Dose:  220 mg   Generic drug:  naproxen sodium        Take 220 mg by mouth 2 times daily as needed for moderate pain   Refills:  0        amLODIPine 5 MG tablet   Commonly known as:  NORVASC   Dose:  5 mg   Quantity:  30 tablet        Take 1 tablet (5 mg) by mouth daily   Refills:  3        LACTAID PO   Dose:  3000 Units        Take 3,000 Units by mouth daily with food As needed   Refills:  0                Orders Needing Specimen Collection     None      Pending Results     No orders found from 9/5/2018 to 9/8/2018.            Pending Culture Results     No orders found from 9/5/2018 to 9/8/2018.            Pending Results Instructions     If you had any lab results that were not finalized at the time of your Discharge, you can call the ED Lab Result RN " "at [phone]. You will be contacted by this team for any positive Lab results or changes in treatment. The nurses are available 7 days a week from 10A to 6:30P.  You can leave a message 24 hours per day and they will return your call.        Thank you for choosing [facility]       Thank you for choosing [facility] for your care. Our goal is always to provide you with excellent care. Hearing back from our patients is one way we can continue to improve our services. Please take a few minutes to complete the written survey that you may receive in the mail after you visit with us. Thank you!        [employer] [employer] Information     [employer] lets you send messages to your doctor, view your test results, renew your prescriptions, schedule appointments and more. To sign up, go to [email or web address] . Click on \"Log in\" on the left side of the screen, which will take you to the Welcome page. Then click on \"Sign up Now\" on the right side of the page.     You will be asked to enter the access code listed below, as well as some personal information. Please follow the directions to create your username and password.     Your access code is: [email or web address]  Expires: 2018 12:28 PM     Your access code will  in 90 days. If you need help or a new code, please call your [facility] clinic or [phone].        Care EveryWhere ID     This is your Care EveryWhere ID. This could be used by other organizations to access your [facility] medical records  [id number]        Equal Access to Services     SERJIO CAMILO : Hadii beni sandrao Sojigarali, waaxda luqadaha, qaybta kaalmada adeegyaalycia, leeann bella . So [facility] [phone].    ATENCIÓN: Si habla español, tiene a means disposición servicios gratuitos de asistencia lingüística. Llame al [phone].    We comply with applicable federal civil rights laws and [place] laws. We do not discriminate on the basis of race, color, national origin, age, disability, sex, " sexual orientation, or gender identity.            After Visit Summary       This is your record. Keep this with you and show to your community pharmacist(s) and doctor(s) at your next visit.

## 2018-09-17 ENCOUNTER — OFFICE VISIT (OUTPATIENT)
Dept: FAMILY MEDICINE | Facility: OTHER | Age: 63
End: 2018-09-17
Attending: EMERGENCY MEDICINE
Payer: COMMERCIAL

## 2018-09-17 VITALS
BODY MASS INDEX: 35.71 KG/M2 | SYSTOLIC BLOOD PRESSURE: 160 MMHG | WEIGHT: 214.6 LBS | DIASTOLIC BLOOD PRESSURE: 90 MMHG | HEART RATE: 68 BPM

## 2018-09-17 DIAGNOSIS — M75.22 BICEPS TENDONITIS, LEFT: ICD-10-CM

## 2018-09-17 PROCEDURE — 99213 OFFICE O/P EST LOW 20 MIN: CPT | Performed by: FAMILY MEDICINE

## 2018-09-17 RX ORDER — ETODOLAC 500 MG
500 TABLET ORAL 2 TIMES DAILY
Qty: 30 TABLET | Refills: 1 | Status: SHIPPED | OUTPATIENT
Start: 2018-09-17

## 2018-09-17 ASSESSMENT — PAIN SCALES - GENERAL: PAINLEVEL: NO PAIN (0)

## 2018-09-17 NOTE — MR AVS SNAPSHOT
"              After Visit Summary   2018    Donna Perea    MRN: [id number]           Patient Information     Date Of Birth          1955        Visit Information        Provider Department      2018 8:00 AM Stevan Baker MD [facility]        Today's Diagnoses     Biceps tendonitis, left           Follow-ups after your visit        Additional Services     PHYSICAL THERAPY REFERRAL                 Who to contact     If you have questions or need follow up information about today's clinic visit or your schedule please contact [facility] directly at [phone].  Normal or non-critical lab and imaging results will be communicated to you by [employer], letter or phone within 4 business days after the clinic has received the results. If you do not hear from us within 7 days, please contact the clinic through [employer] or phone. If you have a critical or abnormal lab result, we will notify you by phone as soon as possible.  Submit refill requests through [email or web address] or call your pharmacy and they will forward the refill request to us. Please allow 3 business days for your refill to be completed.          Additional Information About Your Visit        MyChart Information     [email or web address] lets you send messages to your doctor, view your test results, renew your prescriptions, schedule appointments and more. To sign up, go to [email or web address] . Click on \"Log in\" on the left side of the screen, which will take you to the Welcome page. Then click on \"Sign up Now\" on the right side of the page.     You will be asked to enter the access code listed below, as well as some personal information. Please follow the directions to create your username and password.     Your access code is: [email or web address]  Expires: 2018 12:18 PM     Your access code will  in 90 days. If you need help or a new code, please call your [facility] clinic or [phone].        Care EveryWhere " ID     This is your Care EveryWhere ID. This could be used by other organizations to access your [facility] medical records  [id number]        Your Vitals Were     Pulse BMI (Body Mass Index)                68 35.71 kg/m2           Blood Pressure from Last 3 Encounters:   09/17/18 160/90   09/07/18 (!) 156/101   06/13/18 (!) 152/99    Weight from Last 3 Encounters:   09/17/18 214 lb 9.6 oz (97.3 kg)   06/13/18 200 lb (90.7 kg)   03/26/18 211 lb 9.6 oz (96 kg)              We Performed the Following     PHYSICAL THERAPY REFERRAL          Today's Medication Changes          These changes are accurate as of 9/17/18 11:59 PM.  If you have any questions, ask your nurse or doctor.               Start taking these medicines.        Dose/Directions    etodolac 500 MG tablet   Commonly known as:  LODINE   Used for:  Biceps tendonitis, left   Started by:  Stevan Baker MD        Dose:  500 mg   Take 1 tablet (500 mg) by mouth 2 times daily   Quantity:  30 tablet   Refills:  1         Stop taking these medicines if you haven't already. Please contact your care team if you have questions.     ALEVE 220 MG tablet   Generic drug:  naproxen sodium   Stopped by:  Stevan Baker MD           amLODIPine 5 MG tablet   Commonly known as:  NORVASC   Stopped by:  Stevan Baker MD           LACTAID PO   Stopped by:  Stevan Baker MD                Where to get your medicines      These medications were sent to [facility] [address] [place] [address] [address]  [address]  [address], [facility] [place] [address]     Phone:  [phone]     etodolac 500 MG tablet                Primary Care Provider Fax #    Physician No Ref-Primary [phone]       No address on file        Equal Access to Services     SERJIO CAMILO AH: Enzo Torres, vale castellon, charlette lindo, leeann montoya. So [facility] [phone].    ATENCIÓN: Si alfred schafer means  disposición servicios gratuitos de asistencia lingüística. Georgiana bishop [phone].    We comply with applicable federal civil rights laws and [place] laws. We do not discriminate on the basis of race, color, national origin, age, disability, sex, sexual orientation, or gender identity.            Thank you!     Thank you for choosing [facility] AND \[facility]\""  for your care. Our goal is always to provide you with excellent care. Hearing back from our patients is one way we can continue to improve our services. Please take a few minutes to complete the written survey that you may receive in the mail after your visit with us. Thank you!             Your Updated Medication List - Protect others around you: Learn how to safely use, store and throw away your medicines at www.disposemymeds.org.          This list is accurate as of 9/17/18 11:59 PM.  Always use your most recent med list.                   Brand Name Dispense Instructions for use Diagnosis    etodolac 500 MG tablet    LODINE    30 tablet    Take 1 tablet (500 mg) by mouth 2 times daily    Biceps tendonitis, left

## 2018-09-17 NOTE — NURSING NOTE
Patient here for follow up from ER visit on 09/07/18 for left bicep tendonitis. She is on 15 pound work restriction and used both arms. Breann Loya LPN .......................9/17/2018  7:58 AM

## 2018-09-18 ASSESSMENT — PATIENT HEALTH QUESTIONNAIRE - PHQ9: SUM OF ALL RESPONSES TO PHQ QUESTIONS 1-9: 0

## 2018-09-18 NOTE — PROGRESS NOTES
SUBJECTIVE:   Donna Perea is a 63 year old female who presents to clinic today for the following health issues: ER follow-up    HPI Comments: Patient arrives here for ER follow-up.  Recently seen in the ER and diagnosed with left bicep tendinitis.  Patient works at [employer] back in [place] felt where she was carrying and working and developed some pain in her left antecubital space.  Seem to slowly improve but again developed some pain on 4 September.  Recently seen in the ER.  Started on Aleve.  And restricted her to light duty for 15 pounds.  She arrives here for follow-up.        Patient Active Problem List    Diagnosis Date Noted     Biceps tendonitis, left 09/17/2018     Priority: Medium     History of kidney stones 03/26/2018     Priority: Medium     Essential hypertension 03/26/2018     Priority: Medium     No past medical history on file.   Past Surgical History:   Procedure Laterality Date     OTHER SURGICAL HISTORY      1957,SUR38,APPENDECTOMY OPEN,intussesception and appy     Current Outpatient Prescriptions   Medication Sig Dispense Refill     etodolac (LODINE) 500 MG tablet Take 1 tablet (500 mg) by mouth 2 times daily 30 tablet 1     No Known Allergies    Review of Systems     OBJECTIVE:     /90 (BP Location: Right arm, Patient Position: Sitting)  Pulse 68  Wt 214 lb 9.6 oz (97.3 kg)  BMI 35.71 kg/m2  Body mass index is 35.71 kg/(m^2).  Physical Exam   Constitutional: She appears well-developed.   Pulmonary/Chest: Effort normal.   Musculoskeletal: Normal range of motion.   Tenderness along the bicep tendon antecubital left   Neurological: She is alert.   Psychiatric: She has a normal mood and affect.       none     ASSESSMENT/PLAN:         1. Biceps tendonitis, left  Stop the Aleve start loading.  Physical therapy referral.  Continue with the current light duty restrictions.  Form is filled out.  - etodolac (LODINE) 500 MG tablet; Take 1 tablet (500 mg) by mouth 2 times daily  Dispense: 30  tablet; Refill: 1  - PHYSICAL THERAPY REFERRAL      Stevan Baker MD  [facility]

## 2021-03-31 ENCOUNTER — IMMUNIZATION (OUTPATIENT)
Dept: FAMILY MEDICINE | Facility: OTHER | Age: 66
End: 2021-03-31
Attending: FAMILY MEDICINE
Payer: OTHER GOVERNMENT

## 2021-03-31 PROCEDURE — 0001A PR COVID VAC PFIZER DIL RECON 30 MCG/0.3 ML IM: CPT

## 2021-03-31 PROCEDURE — 91300 PR COVID VAC PFIZER DIL RECON 30 MCG/0.3 ML IM: CPT

## 2021-04-21 ENCOUNTER — IMMUNIZATION (OUTPATIENT)
Dept: FAMILY MEDICINE | Facility: OTHER | Age: 66
End: 2021-04-21
Attending: FAMILY MEDICINE
Payer: OTHER GOVERNMENT

## 2021-04-21 PROCEDURE — 0002A PR COVID VAC PFIZER DIL RECON 30 MCG/0.3 ML IM: CPT

## 2021-04-21 PROCEDURE — 91300 PR COVID VAC PFIZER DIL RECON 30 MCG/0.3 ML IM: CPT

## 2021-11-23 ENCOUNTER — IMMUNIZATION (OUTPATIENT)
Dept: FAMILY MEDICINE | Facility: OTHER | Age: 66
End: 2021-11-23
Attending: FAMILY MEDICINE
Payer: OTHER GOVERNMENT

## 2021-11-23 PROCEDURE — 0004A PR COVID VAC PFIZER DIL RECON 30 MCG/0.3 ML IM: CPT

## 2021-11-23 PROCEDURE — 91300 PR COVID VAC PFIZER DIL RECON 30 MCG/0.3 ML IM: CPT

## 2025-04-04 ENCOUNTER — HOSPITAL ENCOUNTER (OUTPATIENT)
Dept: GENERAL RADIOLOGY | Facility: OTHER | Age: 70
Discharge: HOME | End: 2025-04-04
Payer: MEDICARE

## 2025-04-04 ENCOUNTER — HOSPITAL ENCOUNTER (INPATIENT)
Facility: OTHER | Age: 70
LOS: 2 days | Discharge: HOME | DRG: 871 | End: 2025-04-06
Attending: INTERNAL MEDICINE | Admitting: INTERNAL MEDICINE
Payer: MEDICARE

## 2025-04-04 DIAGNOSIS — J18.9 COMMUNITY ACQUIRED PNEUMONIA, UNSPECIFIED LATERALITY: Primary | ICD-10-CM

## 2025-04-04 PROBLEM — N17.9 AKI (ACUTE KIDNEY INJURY): Status: ACTIVE | Noted: 2025-04-04

## 2025-04-04 LAB
FLUAV RNA SPEC QL NAA+PROBE: NEGATIVE
FLUBV RNA RESP QL NAA+PROBE: NEGATIVE
LACTATE SERPL-SCNC: 1.7 MMOL/L (ref 0.7–2)
LACTATE SERPL-SCNC: 2.8 MMOL/L (ref 0.7–2)
LACTATE SERPL-SCNC: 4.1 MMOL/L (ref 0.7–2)
LACTATE SERPL-SCNC: 4.4 MMOL/L (ref 0.7–2)
LACTATE SERPL-SCNC: 7.8 MMOL/L (ref 0.7–2)
RSV RNA SPEC NAA+PROBE: NEGATIVE
SARS-COV-2 RNA RESP QL NAA+PROBE: NEGATIVE

## 2025-04-04 PROCEDURE — 71046 X-RAY EXAM CHEST 2 VIEWS: CPT

## 2025-04-04 PROCEDURE — 120N000001 HC R&B MED SURG/OB

## 2025-04-04 PROCEDURE — 250N000011 HC RX IP 250 OP 636: Performed by: INTERNAL MEDICINE

## 2025-04-04 PROCEDURE — 71046 X-RAY EXAM CHEST 2 VIEWS: CPT | Mod: 26 | Performed by: RADIOLOGY

## 2025-04-04 PROCEDURE — 87040 BLOOD CULTURE FOR BACTERIA: CPT | Performed by: INTERNAL MEDICINE

## 2025-04-04 PROCEDURE — 36415 COLL VENOUS BLD VENIPUNCTURE: CPT | Performed by: INTERNAL MEDICINE

## 2025-04-04 PROCEDURE — 87637 SARSCOV2&INF A&B&RSV AMP PRB: CPT | Performed by: INTERNAL MEDICINE

## 2025-04-04 PROCEDURE — 83605 ASSAY OF LACTIC ACID: CPT | Performed by: INTERNAL MEDICINE

## 2025-04-04 PROCEDURE — 99223 1ST HOSP IP/OBS HIGH 75: CPT | Performed by: INTERNAL MEDICINE

## 2025-04-04 PROCEDURE — 258N000003 HC RX IP 258 OP 636: Performed by: INTERNAL MEDICINE

## 2025-04-04 RX ORDER — ONDANSETRON 2 MG/ML
4 INJECTION INTRAMUSCULAR; INTRAVENOUS EVERY 6 HOURS PRN
Status: DISCONTINUED | OUTPATIENT
Start: 2025-04-04 | End: 2025-04-06 | Stop reason: HOSPADM

## 2025-04-04 RX ORDER — SODIUM CHLORIDE 9 MG/ML
INJECTION, SOLUTION INTRAVENOUS CONTINUOUS
Status: DISCONTINUED | OUTPATIENT
Start: 2025-04-04 | End: 2025-04-05

## 2025-04-04 RX ORDER — ACETAMINOPHEN 650 MG/1
650 SUPPOSITORY RECTAL EVERY 4 HOURS PRN
Status: DISCONTINUED | OUTPATIENT
Start: 2025-04-04 | End: 2025-04-06 | Stop reason: HOSPADM

## 2025-04-04 RX ORDER — ALBUTEROL SULFATE 0.83 MG/ML
3 SOLUTION RESPIRATORY (INHALATION)
Status: DISCONTINUED | OUTPATIENT
Start: 2025-04-04 | End: 2025-04-06 | Stop reason: HOSPADM

## 2025-04-04 RX ORDER — CALCIUM CARBONATE 500 MG/1
1000 TABLET, CHEWABLE ORAL 4 TIMES DAILY PRN
Status: DISCONTINUED | OUTPATIENT
Start: 2025-04-04 | End: 2025-04-06 | Stop reason: HOSPADM

## 2025-04-04 RX ORDER — AZITHROMYCIN 500 MG/5ML
500 INJECTION, POWDER, LYOPHILIZED, FOR SOLUTION INTRAVENOUS EVERY 24 HOURS
Status: COMPLETED | OUTPATIENT
Start: 2025-04-04 | End: 2025-04-04

## 2025-04-04 RX ORDER — ACETAMINOPHEN 325 MG/1
650 TABLET ORAL EVERY 4 HOURS PRN
Status: DISCONTINUED | OUTPATIENT
Start: 2025-04-04 | End: 2025-04-06 | Stop reason: HOSPADM

## 2025-04-04 RX ORDER — POLYETHYLENE GLYCOL 3350 17 G/17G
17 POWDER, FOR SOLUTION ORAL 2 TIMES DAILY PRN
Status: DISCONTINUED | OUTPATIENT
Start: 2025-04-04 | End: 2025-04-06 | Stop reason: HOSPADM

## 2025-04-04 RX ORDER — LIDOCAINE 40 MG/G
CREAM TOPICAL
Status: DISCONTINUED | OUTPATIENT
Start: 2025-04-04 | End: 2025-04-06 | Stop reason: HOSPADM

## 2025-04-04 RX ORDER — AMOXICILLIN 250 MG
2 CAPSULE ORAL 2 TIMES DAILY PRN
Status: DISCONTINUED | OUTPATIENT
Start: 2025-04-04 | End: 2025-04-06 | Stop reason: HOSPADM

## 2025-04-04 RX ORDER — AMOXICILLIN 250 MG
1 CAPSULE ORAL 2 TIMES DAILY PRN
Status: DISCONTINUED | OUTPATIENT
Start: 2025-04-04 | End: 2025-04-06 | Stop reason: HOSPADM

## 2025-04-04 RX ORDER — ONDANSETRON 4 MG/1
4 TABLET, ORALLY DISINTEGRATING ORAL EVERY 6 HOURS PRN
Status: DISCONTINUED | OUTPATIENT
Start: 2025-04-04 | End: 2025-04-06 | Stop reason: HOSPADM

## 2025-04-04 RX ORDER — CEFTRIAXONE 2 G/1
2 INJECTION, POWDER, FOR SOLUTION INTRAMUSCULAR; INTRAVENOUS DAILY
Status: DISCONTINUED | OUTPATIENT
Start: 2025-04-04 | End: 2025-04-06 | Stop reason: HOSPADM

## 2025-04-04 RX ORDER — IBUPROFEN 200 MG
600 TABLET ORAL EVERY 8 HOURS PRN
COMMUNITY

## 2025-04-04 RX ORDER — MAGNESIUM HYDROXIDE/ALUMINUM HYDROXICE/SIMETHICONE 120; 1200; 1200 MG/30ML; MG/30ML; MG/30ML
30 SUSPENSION ORAL EVERY 4 HOURS PRN
Status: DISCONTINUED | OUTPATIENT
Start: 2025-04-04 | End: 2025-04-06 | Stop reason: HOSPADM

## 2025-04-04 RX ADMIN — SODIUM CHLORIDE 1000 ML: 0.9 INJECTION, SOLUTION INTRAVENOUS at 13:05

## 2025-04-04 RX ADMIN — AZITHROMYCIN MONOHYDRATE 500 MG: 500 INJECTION, POWDER, LYOPHILIZED, FOR SOLUTION INTRAVENOUS at 13:14

## 2025-04-04 RX ADMIN — SODIUM CHLORIDE: 0.9 INJECTION, SOLUTION INTRAVENOUS at 23:59

## 2025-04-04 RX ADMIN — CEFTRIAXONE 2 G: 2 INJECTION, POWDER, FOR SOLUTION INTRAMUSCULAR; INTRAVENOUS at 16:09

## 2025-04-04 RX ADMIN — SODIUM CHLORIDE 1000 ML: 0.9 INJECTION, SOLUTION INTRAVENOUS at 15:31

## 2025-04-04 ASSESSMENT — ACTIVITIES OF DAILY LIVING (ADL)
DRESSING/BATHING_DIFFICULTY: NO
CONCENTRATING,_REMEMBERING_OR_MAKING_DECISIONS_DIFFICULTY: YES
ADLS_ACUITY_SCORE: 29
ADLS_ACUITY_SCORE: 41
DOING_ERRANDS_INDEPENDENTLY_DIFFICULTY: YES
ADLS_ACUITY_SCORE: 27
ADLS_ACUITY_SCORE: 36
ADLS_ACUITY_SCORE: 31
ADLS_ACUITY_SCORE: 27
DIFFICULTY_EATING/SWALLOWING: NO
TOILETING: 0-->INDEPENDENT
ADLS_ACUITY_SCORE: 31
TOILETING: 0-->INDEPENDENT
WEAR_GLASSES_OR_BLIND: YES
HEARING_DIFFICULTY_OR_DEAF: NO
ADLS_ACUITY_SCORE: 27
FALL_HISTORY_WITHIN_LAST_SIX_MONTHS: NO
WALKING_OR_CLIMBING_STAIRS_DIFFICULTY: YES
TOILETING_ISSUES: YES
WALKING_OR_CLIMBING_STAIRS: OTHER (SEE COMMENTS)
CHANGE_IN_FUNCTIONAL_STATUS_SINCE_ONSET_OF_CURRENT_ILLNESS/INJURY: YES
DIFFICULTY_COMMUNICATING: NO
ADLS_ACUITY_SCORE: 27

## 2025-04-04 NOTE — PROVIDER NOTIFICATION
04/04/25 1328   Valuables   Patient Belongings remains with patient   Patient Belongings Remaining with Patient cell phone/electronics;clothing;vision aids;dental appliance  (Upper and lower plates.  Charging cord. and extention cord.)   Did you bring any home meds/supplements to the hospital?  No     A               Admission:  I am responsible for any personal items that are not sent to the safe or pharmacy.  [facility] is not responsible for loss, theft or damage of any property in my possession.    Signature:  _________________________________ Date: _______  Time: _____                                              Staff Signature:  ____________________________ Date: ________  Time: _____      2nd Staff person, if patient is unable/unwilling to sign:    Signature: ________________________________ Date: ________  Time: _____     Discharge:  [facility] has returned all of my personal belongings:    Signature: _________________________________ Date: ________  Time: _____                                          Staff Signature:  ____________________________ Date: ________  Time: _____

## 2025-04-04 NOTE — PROGRESS NOTES
" NSG ADMISSION NOTE    Patient admitted to room 313 at approximately 1230   via wheel chair from emergency room. Patient was accompanied by daughter.     Verbal SBAR report received from Charge RN prior to patient arrival.     Patient ambulated to bed with stand-by assist. Patient alert and oriented X 3. The patient is not having any pain.  . Admission vital signs: Blood pressure 95/62, pulse 117, temperature 99.4  F (37.4  C), temperature source Tympanic, resp. rate (!) 36, height 1.651 m (5' 5\"), weight 99 kg (218 lb 4.8 oz), SpO2 (!) 91%, not currently breastfeeding. Patient was oriented to plan of care, call light, bed controls, tv, telephone, bathroom, and visiting hours.     Risk Assessment    The following safety risks were identified during admission: fall and skin. Yellow risk band applied: YES.     Skin Initial Assessment    This writer admitted this patient and completed a full skin assessment and Delbert score in the Adult PCS flowsheet.   Photo documentation of skin problem and/or wound competed via [employer] application (located under Media):  Yes    Appropriate interventions initiated as needed.              Education    Patient has a [place] to Observation order: No  Observation education completed and documented: N/A      June Velasco RN      "

## 2025-04-04 NOTE — PROGRESS NOTES
Sepsis Evaluation     I was called to see Donna Perea due to  elevated lactate . She is known to have an infection.     PHYSICAL EXAM  Vital Signs:  Temp: 98.1  F (36.7  C) Temp src: Tympanic BP: 113/81 Pulse: 117   Resp: 24 SpO2: (!) 86 % O2 Device: None (Room air) (1 LPM NC APPLIED)      General: acutely ill appearing  Mental Status: AAOx4.     Remainder of physical exam is significant for lung crackles    ASSESSMENT AND PLAN    The patient has signs of sepsis as evidenced by:  1. Presence of 2 SIRS criteria, suspected infection, AND  2. Organ dysfunction: Sepsis work up in progress. Will continue to monitor for signs of organ dysfunction    Sepsis Care Initiation: Starting at 1:58 PM  on 04/04/25, until specified. Prior to this documentation, sepsis, severe sepsis, or septic shock was NOT thought to be a significant cause of illness. This order represents the first time infection was seriously considered to be affecting the patient.    Note: Due to a national blood culture bottle shortage, reduced blood cultures may have been drawn on this patient.    Lactic Acid Results:  Recent Labs   Lab Test 04/04/25  1256   LACT 7.8*      BMI Readings from Last 1 Encounters:   04/04/25 36.33 kg/m       Anti-infectives (From now, onward)      Start     Dose/Rate Route Frequency Ordered Stop    04/05/25 1000  azithromycin (ZITHROMAX) 250 mg in  mL intermittent infusion         250 mg  over 1 Hours Intravenous EVERY 24 HOURS 04/04/25 1240 04/09/25 0959    04/04/25 1300  cefTRIAXone (ROCEPHIN) 2 g vial to attach to  ml bag for ADULTS or NS 50 ml bag for PEDS         2 g  over 30 Minutes Intravenous DAILY 04/04/25 1240 04/11/25 0959    04/04/25 1300  azithromycin (ZITHROMAX) 500 mg in  mL intermittent infusion         500 mg  over 1 Hours Intravenous EVERY 24 HOURS 04/04/25 1240 04/05/25 1259            3 Hour Bundle  Blood Cultures before IV Antibiotics: No, antibiotics were started prior to BCx collection  b/c waiting for BCx to be collected would have been detrimental to the patient  Current antibiotic coverage is appropriate for source of infection.  Total fluids given (ED +Pre-hospital):  Full 30 mL/kg bolus given based on weight: 2,970 mL    6 Hour Bundle (Reassessment)  Repeat Lactic Acid Level: Ordered by reflex for 2 hours after initial lactic acid collection.  Vasopressors: MAP>65 after initial IVF bolus, will continue to monitor fluid status and vital signs.  Repeat perfusion exam:  I attest to having performed a repeat sepsis exam and assessment of perfusion at 1:59 PM .    Disposition: The patient will remain on the current unit. We will continue to monitor this patient closely..      Rafael Hedrick MD  04/04/25, 1:58 PM

## 2025-04-04 NOTE — PROGRESS NOTES
" NSG ADMISSION NOTE    Patient admitted to room 313 at approximately 1210 via wheel chair from clinic. Patient was accompanied by transport tech and daughter.         Patient ambulated to bed with stand-by assist. Patient alert and oriented X 3. The patient is not having any pain.  . Admission vital signs: Blood pressure 113/81, pulse 117, temperature 98.1  F (36.7  C), temperature source Tympanic, resp. rate 24, height 1.651 m (5' 5\"), weight 99 kg (218 lb 4.8 oz), SpO2 (!) 86%, not currently breastfeeding. Patient and daughter was oriented to plan of care, call light, bed controls, tv, telephone, bathroom, and visiting hours.     Risk Assessment    The following safety risks were identified during admission: fall. Yellow risk band applied: YES.            Education    Patient has a [place] to Observation order: No  Observation education completed and documented: N/A      June Torres RN      "

## 2025-04-04 NOTE — PROGRESS NOTES
Persistent elevated lactate and tachycardia. Ordering a 3rd 1 liter bolus, which would give us the 30ml/kg sepsis pathway that she should get.     Rafael Hedrick M.D. 4/4/2025  5:36 PM

## 2025-04-04 NOTE — H&P
"[facility]    History and Physical - Hospitalist Service       Date of Admission:  4/4/2025    Assessment & Plan      Donna Perea is a 69 year old female admitted on 4/4/2025. She presents with dyspnea, fevers, chills, and weakness.     Principal Problem:    CAP (community acquired pneumonia)    Acute respiratory failure with hypoxia  Assessment: present on admission. Patient has leukocytosis, opacities on chest xray, and malaise. Presume bacterial, but will assess for viral pneumonia.   Plan: Admit   Intravenous fluids   IV ceftriaxone and azithromycin day 1   Sputum culture   Send swab for COVID, RSV and influenza.     Active Problems:    Essential hypertension  Assessment: chronic, not on meds  Plan: monitor vital signs      FRANCIS (acute kidney injury)  Assessment: present on admission, likely prerenal as she shows signs of hypovolemia on exam  Plan: intravenous fluids, recheck in AM         Diet: Combination Diet Regular Diet Adult    DVT Prophylaxis: Pneumatic Compression Devices  Sigala Catheter: Not present  Lines: None     Cardiac Monitoring: None  Code Status: Full Code      Clinically Significant Risk Factors Present on Admission              # Anion Gap Metabolic Acidosis: Highest Anion Gap = 20 mmol/L in last 2 days, will monitor and treat as appropriate      # Hypertension: Noted on problem list           # Obesity: Estimated body mass index is 36.33 kg/m  as calculated from the following:    Height as of this encounter: 1.651 m (5' 5\").    Weight as of this encounter: 99 kg (218 lb 4.8 oz).              Disposition Plan     Medically Ready for Discharge: Anticipated in 2-4 Days           Rafael Hedrick MD  Hospitalist Service  [facility]  Securely message with [employer] (more info)  Text page via [facility] Paging/Directory     ______________________________________________________________________    Chief Complaint   Dyspnea and fatigue    History is obtained from " the patient    History of Present Illness   Donna Perea is a 69 year old female who presents to the rapid clinic today with 24 hours of shortness of breath, chills and sweats, and feeling poorly.  She notes dyspnea on exertion and a nonproductive cough.  She had an episode of vomiting last night but it was only 1 occurrence.  No diarrhea.  No chest pain.  She notes no recent travel and no sick contacts.    In the rapid clinic O2 sats were 94% on room air.  She was hemodynamically stable.  Chest x-ray shows retrocardiac opacities consistent with pneumonia, leukocytosis on CBC and a basic metabolic panel showed an elevated BUN and creatinine from her baseline.  She was admitted to the hospital with these findings.    On the medical floor O2 sats were 86% on room air and she was started on supplemental oxygen.       Past Medical History    Hypertension  Kidney stone    Past Surgical History   Past Surgical History:   Procedure Laterality Date    OTHER SURGICAL HISTORY      1957,SUR38,APPENDECTOMY OPEN,intussesception and appy       Prior to Admission Medications   Prior to Admission Medications   Prescriptions Last Dose Informant Patient Reported? Taking?   albuterol (PROAIR HFA/PROVENTIL HFA/VENTOLIN HFA) 108 (90 Base) MCG/ACT inhaler   No No   Sig: Inhale 2 puffs into the lungs every 4 hours as needed for shortness of breath, wheezing or cough.   etodolac (LODINE) 500 MG tablet   No No   Sig: Take 1 tablet (500 mg) by mouth 2 times daily   Patient not taking: Reported on 4/4/2025   predniSONE (DELTASONE) 20 MG tablet   No No   Sig: Take 3 tabs by mouth daily x 3 days, then 2 tabs daily x 3 days, then 1 tab daily x 3 days, then 1/2 tab daily x 3 days.      Facility-Administered Medications: None        Review of Systems    CONSTITUTIONAL:POSITIVE  for chills and sweats  INTEGUMENTARY/SKIN: NEGATIVE for worrisome rashes, moles or lesions  EYES: NEGATIVE for vision changes or irritation  ENT/MOUTH: NEGATIVE for ear,  mouth and throat problems  RESP:POSITIVE for cough-non productive, dyspnea on exertion, and SOB/dyspnea  CV: NEGATIVE for chest pain, palpitations or peripheral edema  GI: POSITIVE for poor appetite and vomiting  : NEGATIVE for frequency, dysuria, or hematuria  MUSCULOSKELETAL: NEGATIVE for significant arthralgias or myalgia  NEURO: NEGATIVE for weakness, dizziness or paresthesias  ENDOCRINE: NEGATIVE for temperature intolerance, skin/hair changes  HEME: NEGATIVE for bleeding problems  PSYCHIATRIC: NEGATIVE for changes in mood or affect    Social History   I have reviewed this patient's social history and updated it with pertinent information if needed.  Social History     Tobacco Use    Smoking status: Former     Current packs/day: 0.00     Types: Cigarettes     Quit date: 1/15/1991     Years since quittin.2    Smokeless tobacco: Never   Substance Use Topics    Alcohol use: Yes     Alcohol/week: 0.0 standard drinks of alcohol     Comment: 2 glasses a week     Drug use: No     Comment: Drug use: Not Asked           Allergies   No Known Allergies     Physical Exam   Vital Signs: Temp: 98.1  F (36.7  C) Temp src: Tympanic BP: 113/81 Pulse: 117   Resp: 24 SpO2: (!) 86 % O2 Device: None (Room air) (1 LPM NC APPLIED)    Weight: 218 lbs 4.8 oz    Constitutional: In mild respiratory distress  Eyes: pupils reactive, extraocular movements intact. Anicteric sclera.   HEENT: Oropharynx nonerythematous. Neck supple, no JVD. Dry mucous membranes  Respiratory: bilateral crackles in bases  Cardiovascular: tachycardia, regular, no murmur. no lower extremity edema.  GI: soft, non-tender, bowel sounds present.  Lymph/Hematologic: no cervical or supraclavicular LAD.  Genitourinary: deferred  Skin: no rashes, or sores  Musculoskeletal: no joint erythema or swelling  Neurologic: cranial nerves symmetric. Neuro exam nonfocal  Psychiatric: alert and oriented x3. Interactive.       Medical Decision Making       75 MINUTES SPENT BY  ME on the date of service doing chart review, history, exam, documentation & further activities per the note.      Data     I have personally reviewed the following data over the past 24 hrs:    24.4 (H)  \   14.3   / 148 (L)     138 102 23.0 /  147 (H)   3.9 16 (L) 2.73 (H) \       Imaging results reviewed over the past 24 hrs:   Recent Results (from the past 24 hours)   XR Chest 2 Views    Narrative    PROCEDURE:  XR CHEST 2 VIEWS    HISTORY: Shortness of breath; Chills; Fever, unspecified fever cause    COMPARISON: No relevant priors available for comparison    FINDINGS: PA and lateral chest radiographs    Cardiomediastinal silhouette is within normal limits.  A few ill-defined retrocardiac opacities. No effusion or pneumothorax.    No suspicious osseous lesion or subdiaphragmatic free air.      Impression    IMPRESSION:    A few ill-defined retrocardiac opacities, could represent atelectasis  or developing infection.    CAROL PARKINSON MD         SYSTEM ID:  [id number]

## 2025-04-04 NOTE — PHARMACY-ADMISSION MEDICATION HISTORY
Pharmacist Admission Medication History    Admission medication history is complete. The information provided in this note is only as accurate as the sources available at the time of the update.    Information Source(s): Patient via in-person    Pertinent Information: Patient does not use any prescription medications or OTC medications on a regular basis.  Patient states she did take 600 mg of ibuprofen yesterday afternoon x1 dose. Patient does not have any prescription insurance.  Albuterol and prednisone have not yet been picked up at the pharmacy. (Sent to [facility] by [facility] provider)    Changes made to PTA medication list:  Added: ibuprofen prn  Deleted: etodolac  Changed: None    Allergies reviewed with patient and updates made in EHR: yes    Medication History Completed By: Shayy Real [facility] 4/4/2025 2:15 PM    PTA Med List   Medication Sig Last Dose/Taking    albuterol (PROAIR HFA/PROVENTIL HFA/VENTOLIN HFA) 108 (90 Base) MCG/ACT inhaler Inhale 2 puffs into the lungs every 4 hours as needed for shortness of breath, wheezing or cough. More than a month    ibuprofen (ADVIL/MOTRIN) 200 MG tablet Take 600 mg by mouth every 8 hours as needed for pain or fever. 4/3/2025 at  4:00 PM    predniSONE (DELTASONE) 20 MG tablet Take 3 tabs by mouth daily x 3 days, then 2 tabs daily x 3 days, then 1 tab daily x 3 days, then 1/2 tab daily x 3 days. More than a month

## 2025-04-04 NOTE — PROGRESS NOTES
Preventing Falls in the Hospital (02:42)  Your health professional recommends that you watch this short online health video.  Find out why you're at risk for falling in the hospital and how to prevent falls.   Purpose: Understand what increases a person's risk of falling in the hospital and how to prevent falls.  Goal: Understand what increases a person's risk of falling in the hospital and how to prevent falls.    Watch: Scan the QR code or visit the link to view video       https://hwi.se/[email or web address]  Current as of: July 17, 2023  Content Version: 14.2 2024 [facility] [employer].   Care instructions adapted under license by your healthcare professional. If you have questions about a medical condition or this instruction, always ask your healthcare professional. [employer] disclaims any warranty or liability for your use of this information.  Preventing Falls in the Hospital     Patient viewed the fall video, verbalized understanding to call for help.

## 2025-04-04 NOTE — PLAN OF CARE
"Goal Outcome Evaluation:      Plan of Care Reviewed With: patient, family    Overall Patient Progress: improvingOverall Patient Progress: improving    Outcome Evaluation: Patient lactic is trending down, antibiotics initiated. VSS, BP can be soft. Afebrile.    Patient is alert and oriented. VSS, Bps have been running soft, provider aware. Patient skin is maroon/purple mottled from head to toe. Patient heart rate has been tachy, RR 36. Patient has had 3 bolus infusions due to increased lactic lab. LS have some crackles at the bases. BS audible. Patient voiding adequate urine out. Assist of x1 when ambulating with gait belt. Patient currently on 1 L via NC satting in low 90's. Blood pressure 95/62, pulse 117, temperature 99.4  F (37.4  C), temperature source Tympanic, resp. rate (!) 36, height 1.651 m (5' 5\"), weight 99 kg (218 lb 4.8 oz), SpO2 (!) 91%, not currently breastfeeding.   "

## 2025-04-05 LAB
ANION GAP SERPL CALCULATED.3IONS-SCNC: 11 MMOL/L (ref 7–15)
BUN SERPL-MCNC: 33.6 MG/DL (ref 8–23)
CALCIUM SERPL-MCNC: 8.2 MG/DL (ref 8.8–10.4)
CHLORIDE SERPL-SCNC: 108 MMOL/L (ref 98–107)
CREAT SERPL-MCNC: 1.9 MG/DL (ref 0.51–0.95)
EGFRCR SERPLBLD CKD-EPI 2021: 28 ML/MIN/1.73M2
ERYTHROCYTE [DISTWIDTH] IN BLOOD BY AUTOMATED COUNT: 14.6 % (ref 10–15)
GLUCOSE SERPL-MCNC: 99 MG/DL (ref 70–99)
HCO3 SERPL-SCNC: 21 MMOL/L (ref 22–29)
HCT VFR BLD AUTO: 38.9 % (ref 35–47)
HGB BLD-MCNC: 12.5 G/DL (ref 11.7–15.7)
MAGNESIUM SERPL-MCNC: 1.7 MG/DL (ref 1.7–2.3)
MCH RBC QN AUTO: 27.7 PG (ref 26.5–33)
MCHC RBC AUTO-ENTMCNC: 32.1 G/DL (ref 31.5–36.5)
MCV RBC AUTO: 86 FL (ref 78–100)
PLATELET # BLD AUTO: 86 10E3/UL (ref 150–450)
POTASSIUM SERPL-SCNC: 3.6 MMOL/L (ref 3.4–5.3)
RBC # BLD AUTO: 4.52 10E6/UL (ref 3.8–5.2)
SODIUM SERPL-SCNC: 140 MMOL/L (ref 135–145)
WBC # BLD AUTO: 17.9 10E3/UL (ref 4–11)

## 2025-04-05 PROCEDURE — 83735 ASSAY OF MAGNESIUM: CPT | Performed by: INTERNAL MEDICINE

## 2025-04-05 PROCEDURE — 85027 COMPLETE CBC AUTOMATED: CPT | Performed by: INTERNAL MEDICINE

## 2025-04-05 PROCEDURE — 99232 SBSQ HOSP IP/OBS MODERATE 35: CPT | Performed by: INTERNAL MEDICINE

## 2025-04-05 PROCEDURE — 250N000011 HC RX IP 250 OP 636: Performed by: INTERNAL MEDICINE

## 2025-04-05 PROCEDURE — 80048 BASIC METABOLIC PNL TOTAL CA: CPT | Performed by: INTERNAL MEDICINE

## 2025-04-05 PROCEDURE — 258N000003 HC RX IP 258 OP 636: Performed by: INTERNAL MEDICINE

## 2025-04-05 PROCEDURE — 36415 COLL VENOUS BLD VENIPUNCTURE: CPT | Performed by: INTERNAL MEDICINE

## 2025-04-05 PROCEDURE — 120N000001 HC R&B MED SURG/OB

## 2025-04-05 RX ADMIN — CEFTRIAXONE 2 G: 2 INJECTION, POWDER, FOR SOLUTION INTRAMUSCULAR; INTRAVENOUS at 09:57

## 2025-04-05 RX ADMIN — AZITHROMYCIN MONOHYDRATE 250 MG: 500 INJECTION, POWDER, LYOPHILIZED, FOR SOLUTION INTRAVENOUS at 09:57

## 2025-04-05 ASSESSMENT — ACTIVITIES OF DAILY LIVING (ADL)
ADLS_ACUITY_SCORE: 48
ADLS_ACUITY_SCORE: 47
ADLS_ACUITY_SCORE: 47
ADLS_ACUITY_SCORE: 48
ADLS_ACUITY_SCORE: 47
ADLS_ACUITY_SCORE: 48
ADLS_ACUITY_SCORE: 47
ADLS_ACUITY_SCORE: 48
ADLS_ACUITY_SCORE: 48
ADLS_ACUITY_SCORE: 47
ADLS_ACUITY_SCORE: 36
ADLS_ACUITY_SCORE: 47
ADLS_ACUITY_SCORE: 47
ADLS_ACUITY_SCORE: 48
ADLS_ACUITY_SCORE: 48

## 2025-04-05 NOTE — PROGRESS NOTES
"[facility]    Medicine Progress Note - Hospitalist Service    Date of Admission:  4/4/2025    Assessment & Plan      Donna Perea is a 69 year old female admitted on 4/4/2025. She presents with dyspnea, fevers, chills, and weakness.      Principal Problem:    CAP (community acquired pneumonia)    Acute respiratory failure with hypoxia    Severe sepsis due to pneumonia   Assessment: present on admission. Patient has leukocytosis, opacities on chest xray, and malaise. Presume bacterial. Lacteate severely elevated on admit and after 4 liters of intravenous fluids her lactate normalized. She feels much better today.   Plan:    Stop Intravenous fluids              IV ceftriaxone and azithromycin day 2              Sputum culture              Wean oxygen to off.      Active Problems:    Essential hypertension  Assessment: chronic, not on meds  Plan: monitor vital signs       FRANCIS (acute kidney injury)  Assessment: present on admission, likely sepsis/prerenal as she shows signs of hypovolemia on exam. Improved overnight.   Plan: stop intravenous fluids, recheck in AM           Diet: Combination Diet Regular Diet Adult    DVT Prophylaxis: Pneumatic Compression Devices  Sigala Catheter: Not present  Lines: None     Cardiac Monitoring: ACTIVE order. Indication: Tachyarrhythmias, acute (48 hours)  Code Status: Full Code      Clinically Significant Risk Factors Present on Admission          # Hyperchloremia: Highest Cl = 108 mmol/L in last 2 days, will monitor as appropriate         # Anion Gap Metabolic Acidosis: Highest Anion Gap = 20 mmol/L in last 2 days, will monitor and treat as appropriate    # Thrombocytopenia: Lowest platelets = 86 in last 2 days, will monitor for bleeding   # Hypertension: Noted on problem list           # Obesity: Estimated body mass index is 36.88 kg/m  as calculated from the following:    Height as of this encounter: 1.651 m (5' 5\").    Weight as of this encounter: 100.5 kg " (221 lb 9.6 oz).              Social Drivers of Health    Food Insecurity: High Risk (4/4/2025)    Food Insecurity     Within the past 12 months, did you worry that your food would run out before you got money to buy more?: Yes     Within the past 12 months, did the food you bought just not last and you didn t have money to get more?: Yes   Housing Stability: High Risk (4/4/2025)    Housing Stability     Do you have housing? : Yes     Are you worried about losing your housing?: Yes   Tobacco Use: Medium Risk (4/4/2025)    Patient History     Smoking Tobacco Use: Former     Smokeless Tobacco Use: Never   Financial Resource Strain: High Risk (4/4/2025)    Financial Resource Strain     Within the past 12 months, have you or your family members you live with been unable to get utilities (heat, electricity) when it was really needed?: Yes          Disposition Plan     Medically Ready for Discharge: Anticipated in 2-4 Days             Rafael Hedrick MD  Hospitalist Service  [facility]  Securely message with [employer] (more info)  Text page via [employer] Paging/Directory   ______________________________________________________________________    Interval History   Feeling better. Less dyspnea. No nausea, vomiting.     Physical Exam   Vital Signs: Temp: 98.7  F (37.1  C) Temp src: Tympanic BP: 90/60 Pulse: 108   Resp: 18 SpO2: 94 % O2 Device: None (Room air) Oxygen Delivery: 1 LPM  Weight: 221 lbs 9.6 oz    GENERAL: Comfortable, talkative, in no apparent distress.  HEENT: Anicteric, non-injected sclera, mouth moist.   NECK: No JVD.  CARDIOVASCULAR: regular rate and rhythm, no murmur. No lower extremity edema   RESPIRATORY: Clear to auscultation bilaterally, no wheezes, no crackles.  GI: Non-distended, normal bowel sounds, soft, non-tender.  SKIN: No rashes, sores.   NEUROLOGY: Alert and oriented x3, follows commands, speech and language normal.       Medical Decision Making       45 MINUTES SPENT BY ME on  the date of service doing chart review, history, exam, documentation & further activities per the note.      Data     I have personally reviewed the following data over the past 24 hrs:    17.9 (H)  \   12.5   / 86 (L)     140 108 (H) 33.6 (H) /  99   3.6 21 (L) 1.90 (H) \     Procal: N/A CRP: N/A Lactic Acid: 1.7         Imaging results reviewed over the past 24 hrs:   Recent Results (from the past 24 hours)   XR Chest 2 Views    Narrative    PROCEDURE:  XR CHEST 2 VIEWS    HISTORY: Shortness of breath; Chills; Fever, unspecified fever cause    COMPARISON: No relevant priors available for comparison    FINDINGS: PA and lateral chest radiographs    Cardiomediastinal silhouette is within normal limits.  A few ill-defined retrocardiac opacities. No effusion or pneumothorax.    No suspicious osseous lesion or subdiaphragmatic free air.      Impression    IMPRESSION:    A few ill-defined retrocardiac opacities, could represent atelectasis  or developing infection.    CAROL PARKINSON MD         SYSTEM ID:  [id number]

## 2025-04-05 NOTE — PLAN OF CARE
"Goal Outcome Evaluation:    /72 (BP Location: Right arm, Patient Position: Semi-Silverman's, Cuff Size: Adult Regular)   Pulse 108   Temp 98.2  F (36.8  C) (Tympanic)   Resp 18   Ht 1.651 m (5' 5\")   Wt 100.5 kg (221 lb 9.6 oz)   SpO2 93%   BMI 36.88 kg/m      Patient resting comfortably throughout the noc, continuing SOB at rest. Denies pain, continuing with respiratory assistance DT diagnosis, fluid therapy and monitoring labs. Per prior report, overall skin color has improved from light purple to so in color. Patient states she slept well throughout the noc.       Problem: Adult Inpatient Plan of Care  Goal: Plan of Care Review  Description: The Plan of Care Review/Shift note should be completed every shift.  The Outcome Evaluation is a brief statement about your assessment that the patient is improving, declining, or no change.  This information will be displayed automatically on your shiftnote.  Outcome: Progressing  Goal: Patient-Specific Goal (Individualized)  Description: You can add care plan individualizations to a care plan. Examples of Individualization might be:  \"Parent requests to be called daily at 9am for status\", \"I have a hard time hearing out of my right ear\", or \"Do not touch me to wake me up as it startlesme\".  Outcome: Progressing  Goal: Absence of Hospital-Acquired Illness or Injury  Outcome: Progressing  Intervention: Prevent and Manage VTE (Venous Thromboembolism) Risk  Recent Flowsheet Documentation  Taken 4/5/2025 0309 by David Mccullough, RN  VTE Prevention/Management: SCDs on (sequential compression devices)  Goal: Optimal Comfort and Wellbeing  Outcome: Progressing  Intervention: Provide Person-Centered Care  Recent Flowsheet Documentation  Taken 4/5/2025 0309 by David Mccullough, RN  Trust Relationship/Rapport: care explained  Goal: Readiness for Transition of Care  Outcome: Progressing     Problem: Delirium  Goal: Optimal Coping  Outcome: Progressing  Goal: Improved Behavioral " Control  Outcome: Progressing  Intervention: Minimize Safety Risk  Recent Flowsheet Documentation  Taken 4/5/2025 0309 by David Mccullough, RN  Trust Relationship/Rapport: care explained  Goal: Improved Attention and Thought Clarity  Outcome: Progressing  Goal: Improved Sleep  Outcome: Progressing     Problem: Infection  Goal: Absence of Infection Signs and Symptoms  Outcome: Progressing     Problem: Pneumonia  Goal: Fluid Balance  Outcome: Progressing  Goal: Resolution of Infection Signs and Symptoms  Outcome: Progressing  Goal: Effective Oxygenation and Ventilation  Outcome: Progressing     Problem: Gas Exchange Impaired  Goal: Optimal Gas Exchange  Outcome: Progressing

## 2025-04-06 VITALS
OXYGEN SATURATION: 95 % | DIASTOLIC BLOOD PRESSURE: 83 MMHG | RESPIRATION RATE: 18 BRPM | HEART RATE: 108 BPM | HEIGHT: 65 IN | SYSTOLIC BLOOD PRESSURE: 131 MMHG | TEMPERATURE: 96.8 F | WEIGHT: 224.6 LBS | BODY MASS INDEX: 37.42 KG/M2

## 2025-04-06 LAB
ANION GAP SERPL CALCULATED.3IONS-SCNC: 11 MMOL/L (ref 7–15)
BUN SERPL-MCNC: 34.8 MG/DL (ref 8–23)
CALCIUM SERPL-MCNC: 8.6 MG/DL (ref 8.8–10.4)
CHLORIDE SERPL-SCNC: 109 MMOL/L (ref 98–107)
CREAT SERPL-MCNC: 1.16 MG/DL (ref 0.51–0.95)
EGFRCR SERPLBLD CKD-EPI 2021: 51 ML/MIN/1.73M2
ERYTHROCYTE [DISTWIDTH] IN BLOOD BY AUTOMATED COUNT: 14.6 % (ref 10–15)
GLUCOSE SERPL-MCNC: 88 MG/DL (ref 70–99)
HCO3 SERPL-SCNC: 20 MMOL/L (ref 22–29)
HCT VFR BLD AUTO: 37.7 % (ref 35–47)
HGB BLD-MCNC: 12 G/DL (ref 11.7–15.7)
MAGNESIUM SERPL-MCNC: 1.8 MG/DL (ref 1.7–2.3)
MCH RBC QN AUTO: 27.7 PG (ref 26.5–33)
MCHC RBC AUTO-ENTMCNC: 31.8 G/DL (ref 31.5–36.5)
MCV RBC AUTO: 87 FL (ref 78–100)
PLATELET # BLD AUTO: 78 10E3/UL (ref 150–450)
POTASSIUM SERPL-SCNC: 3.6 MMOL/L (ref 3.4–5.3)
RBC # BLD AUTO: 4.33 10E6/UL (ref 3.8–5.2)
SODIUM SERPL-SCNC: 140 MMOL/L (ref 135–145)
WBC # BLD AUTO: 15.2 10E3/UL (ref 4–11)

## 2025-04-06 PROCEDURE — 258N000003 HC RX IP 258 OP 636: Performed by: INTERNAL MEDICINE

## 2025-04-06 PROCEDURE — 99239 HOSP IP/OBS DSCHRG MGMT >30: CPT | Performed by: INTERNAL MEDICINE

## 2025-04-06 PROCEDURE — 85014 HEMATOCRIT: CPT | Performed by: INTERNAL MEDICINE

## 2025-04-06 PROCEDURE — 250N000011 HC RX IP 250 OP 636: Performed by: INTERNAL MEDICINE

## 2025-04-06 PROCEDURE — 36415 COLL VENOUS BLD VENIPUNCTURE: CPT | Performed by: INTERNAL MEDICINE

## 2025-04-06 PROCEDURE — 80048 BASIC METABOLIC PNL TOTAL CA: CPT | Performed by: INTERNAL MEDICINE

## 2025-04-06 PROCEDURE — 83735 ASSAY OF MAGNESIUM: CPT | Performed by: INTERNAL MEDICINE

## 2025-04-06 RX ORDER — CEFUROXIME AXETIL 500 MG/1
500 TABLET ORAL 2 TIMES DAILY
Qty: 10 TABLET | Refills: 0 | Status: SHIPPED | OUTPATIENT
Start: 2025-04-06 | End: 2025-04-11

## 2025-04-06 RX ORDER — AZITHROMYCIN 250 MG/1
TABLET, FILM COATED ORAL
Qty: 2 TABLET | Refills: 0 | Status: SHIPPED | OUTPATIENT
Start: 2025-04-06

## 2025-04-06 RX ADMIN — CEFTRIAXONE 2 G: 2 INJECTION, POWDER, FOR SOLUTION INTRAMUSCULAR; INTRAVENOUS at 09:43

## 2025-04-06 RX ADMIN — AZITHROMYCIN MONOHYDRATE 250 MG: 500 INJECTION, POWDER, LYOPHILIZED, FOR SOLUTION INTRAVENOUS at 09:43

## 2025-04-06 ASSESSMENT — ACTIVITIES OF DAILY LIVING (ADL)
ADLS_ACUITY_SCORE: 47
ADLS_ACUITY_SCORE: 47
ADLS_ACUITY_SCORE: 46
ADLS_ACUITY_SCORE: 48
ADLS_ACUITY_SCORE: 47
ADLS_ACUITY_SCORE: 48
ADLS_ACUITY_SCORE: 46
ADLS_ACUITY_SCORE: 47
ADLS_ACUITY_SCORE: 48

## 2025-04-06 NOTE — PROGRESS NOTES
[facility] DISCHARGE NOTE    Patient discharged to home at 11:50 AM via wheel chair. Accompanied by daughter and staff. Discharge instructions reviewed with daughter, opportunity offered to ask questions. Prescriptions sent to patients preferred pharmacy. All belongings sent with patient.    June Velasco RN

## 2025-04-06 NOTE — DISCHARGE SUMMARY
"[facility]  Hospitalist Discharge Summary      Date of Admission:  4/4/2025  Date of Discharge:  4/6/2025  Discharging Provider: Rafael Hedrick MD  Discharge Service: Hospitalist Service    Discharge Diagnoses   Principal Problem:    Severe sepsis due to pneumonia and gram negative alice bacteremia    CAP (community acquired pneumonia)    Acute respiratory failure with hypoxia  Active Problems:    Essential hypertension    FRANCIS (acute kidney injury)        Clinically Significant Risk Factors     # Obesity: Estimated body mass index is 37.38 kg/m  as calculated from the following:    Height as of this encounter: 1.651 m (5' 5\").    Weight as of this encounter: 101.9 kg (224 lb 9.6 oz).       Follow-ups Needed After Discharge   Follow-up Appointments       Follow-up and recommended labs and tests       Follow up with Jennifer Bar on 4/11/2025 at 10:30 AM                Unresulted Labs Ordered in the Past 30 Days of this Admission       Date and Time Order Name Status Description    4/4/2025  1:59 PM Blood Culture Arm, Left Preliminary     4/4/2025  1:59 PM Blood Culture Arm, Left Preliminary         These results will be followed up by hospitalists    Discharge Disposition   Discharged to home  Condition at discharge: Stable    Hospital Course   Donna Perea is a 69 year old female admitted on 4/4/2025. She presents with dyspnea, fevers, chills, and weakness. Per the H&P:  \"Donna Perea is a 69 year old female who presents to the rapid clinic today with 24 hours of shortness of breath, chills and sweats, and feeling poorly.  She notes dyspnea on exertion and a nonproductive cough.  She had an episode of vomiting last night but it was only 1 occurrence.  No diarrhea.  No chest pain.  She notes no recent travel and no sick contacts.     In the rapid clinic O2 sats were 94% on room air.  She was hemodynamically stable.  Chest x-ray shows retrocardiac opacities consistent with pneumonia, " "leukocytosis on CBC and a basic metabolic panel showed an elevated BUN and creatinine from her baseline.  She was admitted to the hospital with these findings.     On the medical floor O2 sats were 86% on room air and she was started on supplemental oxygen. \"     Principal Problem:    CAP (community acquired pneumonia)    Acute respiratory failure with hypoxia    Severe sepsis due to pneumonia   Assessment: present on admission. Patient has leukocytosis, opacities on chest xray, and malaise. Lactate severely elevated on admit and after 4 liters of intravenous fluids her lactate normalized. Blood culture growing gram negative rods. Improved condition on azithromycin and ceftriaxone. Wants to discharge today, we discussed staying until blood culture finalized tomorrow but understands risks of leaving today. If blood culture shows we need a different antibiotic we will adjust when result available.   Plan:    discharge today              IV ceftriaxone and azithromycin switched to ceftin and azithromycin     Active Problems:    Essential hypertension  Assessment: chronic, not on meds  Plan: monitor vital signs       FRANCIS (acute kidney injury)  Assessment: present on admission, likely sepsis/prerenal as she shows signs of hypovolemia on exam. Improving    Consultations This Hospital Stay   None    Code Status   Full Code    Time Spent on this Encounter   I, Rafael Hedrick MD, personally saw the patient today and spent greater than 30 minutes discharging this patient.       Rafael Hedrick MD  [facility]  [address]  [place] [address]  Phone: [phone]  Fax: [phone]  ______________________________________________________________________    Physical Exam   Vital Signs: Temp: 96.8  F (36  C) Temp src: Tympanic BP: 131/83     Resp: 18 SpO2: 95 % O2 Device: None (Room air)    Weight: 224 lbs 9.6 oz  GENERAL: Comfortable, no apparent distress.           Primary Care " Physician   Physician No Ref-Primary    Discharge Orders      Reason for your hospital stay    Sepsis due to pneumonia     Follow-up and recommended labs and tests     Follow up with Jennifer Bar on 4/11/2025 at 10:30 AM     Activity    Your activity upon discharge: activity as tolerated     Diet    Follow this diet upon discharge:       Combination Diet Regular Diet Adult       Significant Results and Procedures   Most Recent 3 CBC's:  Recent Labs   Lab Test 04/06/25  0554 04/05/25  0636 04/04/25  1035   WBC 15.2* 17.9* 24.4*   HGB 12.0 12.5 14.3   MCV 87 86 85   PLT 78* 86* 148*     Most Recent 3 BMP's:  Recent Labs   Lab Test 04/06/25  0554 04/05/25  0636 04/04/25  1035    140 138   POTASSIUM 3.6 3.6 3.9   CHLORIDE 109* 108* 102   CO2 20* 21* 16*   BUN 34.8* 33.6* 23.0   CR 1.16* 1.90* 2.73*   ANIONGAP 11 11 20*   SIOBHAN 8.6* 8.2* 9.1   GLC 88 99 147*     Most Recent 2 LFT's:  Recent Labs   Lab Test 03/12/18  1100   AST 22   ALT 24   ALKPHOS 73   BILITOTAL 0.7     7-Day Micro Results       Collected Updated Procedure Result Status      04/04/2025 1505 04/06/2025 0805 Blood Culture Arm, Left [[id number]]    (Abnormal)   Blood from Arm, Left    Preliminary result Component Value   Culture Gram negative bacilli  [P]                04/04/2025 1505 04/05/2025 1516 Blood Culture Arm, Left [[id number]]   Blood from Arm, Left    Preliminary result Component Value   Culture No growth after 1 day  [P]                04/04/2025 1257 04/04/2025 1359 Influenza A/B, RSV and SARS-CoV2 PCR (COVID-19) Nose [[id number]]    Swab from Nose    Final result Component Value   Influenza A PCR Negative   Influenza B PCR Negative   RSV PCR Negative   SARS CoV2 PCR Negative   NEGATIVE: SARS-CoV-2 (COVID-19) RNA not detected, presumed negative.                ,   Results for orders placed or performed in visit on 04/04/25   XR Chest 2 Views    Narrative    PROCEDURE:  XR CHEST 2 VIEWS    HISTORY: Shortness of breath; Chills; Fever,  unspecified fever cause    COMPARISON: No relevant priors available for comparison    FINDINGS: PA and lateral chest radiographs    Cardiomediastinal silhouette is within normal limits.  A few ill-defined retrocardiac opacities. No effusion or pneumothorax.    No suspicious osseous lesion or subdiaphragmatic free air.      Impression    IMPRESSION:    A few ill-defined retrocardiac opacities, could represent atelectasis  or developing infection.    CAROL PARKINSON MD         SYSTEM ID:  [id number]       Discharge Medications   Current Discharge Medication List        START taking these medications    Details   azithromycin (ZITHROMAX) 250 MG tablet 1 pill daily for 2 days  Qty: 2 tablet, Refills: 0    Associated Diagnoses: Community acquired pneumonia, unspecified laterality      cefuroxime (CEFTIN) 500 MG tablet Take 1 tablet (500 mg) by mouth 2 times daily for 5 days.  Qty: 10 tablet, Refills: 0    Associated Diagnoses: Community acquired pneumonia, unspecified laterality           CONTINUE these medications which have NOT CHANGED    Details   albuterol (PROAIR HFA/PROVENTIL HFA/VENTOLIN HFA) 108 (90 Base) MCG/ACT inhaler Inhale 2 puffs into the lungs every 4 hours as needed for shortness of breath, wheezing or cough.  Qty: 18 g, Refills: 1    Comments: Pharmacy may dispense brand covered by insurance (Proair, or proventil or ventolin or generic albuterol inhaler)  Associated Diagnoses: Shortness of breath      ibuprofen (ADVIL/MOTRIN) 200 MG tablet Take 600 mg by mouth every 8 hours as needed for pain or fever.           STOP taking these medications       predniSONE (DELTASONE) 20 MG tablet Comments:   Reason for Stopping:             Allergies   No Known Allergies

## 2025-04-06 NOTE — PLAN OF CARE
Goal Outcome Evaluation:    Patient is alert and oriented.  Up ambulating in halls and denies any dizziness or shortness of breath this evening. Patient lungs are clear and continues on room air with Sp02 >92%.  Patient denies any pain.  VSS and afebrile.

## 2025-04-06 NOTE — PLAN OF CARE
Goal Outcome Evaluation:      Plan of Care Reviewed With: patient, family    Overall Patient Progress: improvingOverall Patient Progress: improving    Outcome Evaluation: Lactic WNL, VSS, Afebrile, Appetite is improving, patient up ambulating in hallways and doing well, Denies pain and lightheadedness. Plan to discharge after 1000 antibiotics.

## 2025-04-06 NOTE — PHARMACY - DISCHARGE MEDICATION RECONCILIATION AND EDUCATION
Pharmacy:  Discharge Counseling and Medication Reconciliation    Donna Perea  [address]  [address]  [facility] [address]  [phone] (home)   69 year old female  PCP: No Ref-Primary, Physician    Allergies: Patient has no known allergies.    Discharge Counseling:    Pharmacist met with patient (and/or family) today to review the medication portion of the After Visit Summary (with an emphasis on NEW medications) and to address patient's questions/concerns.    Summary of Education: Met with patient and daughters to discuss new medications: azithromycin, cefuroxime and albuterol.  Patient requested a call once final cultures result.  Patient can be reached on her cell at [phone].  The prescription for prednisone that was prescribed through the [facility] was cancelled as she had not picked this up yet    Materials Provided:  MedCounselor sheets printed from Clinical Pharmacology on: as above    Discharge Medication Reconciliation:    It has been determined that the patient has an adequate supply of medications available or which can be obtained from the patient's preferred pharmacy, which HE/SHE has confirmed as: [facility]    Thank you for the consult.    Shayy Real [facility]........April 6, 2025 10:50 AM

## 2025-04-07 ENCOUNTER — PATIENT OUTREACH (OUTPATIENT)
Dept: FAMILY MEDICINE | Facility: OTHER | Age: 70
End: 2025-04-07
Payer: MEDICARE

## 2025-04-07 NOTE — TELEPHONE ENCOUNTER
Transitions of Care Outreach  Chief Complaint   Patient presents with    Hospital F/U       Most Recent Admission Date: 4/4/2025   Most Recent Admission Diagnosis: CAP (community acquired pneumonia) - J18.9     Most Recent Discharge Date: 4/6/2025   Most Recent Discharge Diagnosis: Community acquired pneumonia, unspecified laterality - J18.9     Transitions of Care Assessment    Discharge Assessment  How are you doing now that you are home?: better  How are your symptoms? (Red Flag symptoms escalate to triage hotline per guidelines): Improved  Do you know how to contact your clinic care team if you have future questions or changes to your health status? : Yes  Does the patient have their discharge instructions? : Yes  Does the patient have questions regarding their discharge instructions? : No  Were you started on any new medications or were there changes to any of your previous medications? : Yes  Does the patient have all of their medications?: No (see comment) (patient going to pick them up today)  Do you have questions regarding any of your medications? : No  Do you have all of your needed medical supplies or equipment (DME)?  (i.e. oxygen tank, CPAP, cane, etc.): Yes    Follow up Plan     Discharge Follow-Up  Discharge follow up appointment scheduled in alignment with recommended follow up timeframe or Transitions of Risk Category? (Low = within 30 days; Moderate= within 14 days; High= within 7 days): Yes  Discharge Follow Up Appointment Date: 04/11/25  Discharge Follow Up Appointment Scheduled with?: Primary Care Provider    Future Appointments   Date Time Provider Department Center   4/11/2025 10:30 AM Jennifer Bar NP [facility] [place]       Outpatient Plan as outlined on AVS reviewed with patient.    For any urgent concerns, please contact our 24 hour nurse triage line: [phone] ([phone])       Елена Birmingham RN

## 2025-04-10 LAB
BACTERIA BLD CULT: ABNORMAL
BACTERIA BLD CULT: ABNORMAL

## 2025-04-11 PROBLEM — E66.812 CLASS 2 SEVERE OBESITY WITH BODY MASS INDEX (BMI) OF 35 TO 39.9 WITH SERIOUS COMORBIDITY (H): Status: ACTIVE | Noted: 2025-04-11

## 2025-04-11 PROBLEM — E66.01 CLASS 2 SEVERE OBESITY WITH BODY MASS INDEX (BMI) OF 35 TO 39.9 WITH SERIOUS COMORBIDITY (H): Status: ACTIVE | Noted: 2025-04-11

## 2025-04-26 ENCOUNTER — HEALTH MAINTENANCE LETTER (OUTPATIENT)
Age: 70
End: 2025-04-26

## 2025-05-17 ENCOUNTER — HEALTH MAINTENANCE LETTER (OUTPATIENT)
Age: 70
End: 2025-05-17

## 2025-07-06 ENCOUNTER — APPOINTMENT (OUTPATIENT)
Dept: GENERAL RADIOLOGY | Facility: OTHER | Age: 70
DRG: 853 | End: 2025-07-06
Attending: EMERGENCY MEDICINE
Payer: MEDICARE

## 2025-07-06 ENCOUNTER — HOSPITAL ENCOUNTER (INPATIENT)
Facility: OTHER | Age: 70
LOS: 2 days | Discharge: HOME | DRG: 853 | End: 2025-07-09
Attending: EMERGENCY MEDICINE
Payer: MEDICARE

## 2025-07-06 ENCOUNTER — APPOINTMENT (OUTPATIENT)
Dept: CT IMAGING | Facility: OTHER | Age: 70
DRG: 853 | End: 2025-07-06
Attending: EMERGENCY MEDICINE
Payer: MEDICARE

## 2025-07-06 DIAGNOSIS — N13.2 HYDRONEPHROSIS WITH URINARY OBSTRUCTION DUE TO URETERAL CALCULUS: ICD-10-CM

## 2025-07-06 DIAGNOSIS — I10 HYPERTENSION, UNSPECIFIED TYPE: ICD-10-CM

## 2025-07-06 DIAGNOSIS — T78.40XA ALLERGIC REACTION TO DRUG, INITIAL ENCOUNTER: ICD-10-CM

## 2025-07-06 DIAGNOSIS — N20.1 LEFT URETERAL STONE: ICD-10-CM

## 2025-07-06 LAB
ALBUMIN SERPL BCG-MCNC: 4.1 G/DL (ref 3.5–5.2)
ALBUMIN UR-MCNC: NEGATIVE MG/DL
ALP SERPL-CCNC: 90 U/L (ref 40–150)
ALT SERPL W P-5'-P-CCNC: 19 U/L (ref 0–50)
ANION GAP SERPL CALCULATED.3IONS-SCNC: 16 MMOL/L (ref 7–15)
APPEARANCE UR: CLEAR
AST SERPL W P-5'-P-CCNC: 26 U/L (ref 0–45)
BACTERIA #/AREA URNS HPF: ABNORMAL /HPF
BASOPHILS # BLD AUTO: 0 10E3/UL (ref 0–0.2)
BASOPHILS NFR BLD AUTO: 0 %
BILIRUB SERPL-MCNC: 0.6 MG/DL
BILIRUB UR QL STRIP: NEGATIVE
BUN SERPL-MCNC: 13.8 MG/DL (ref 8–23)
CALCIUM SERPL-MCNC: 9 MG/DL (ref 8.8–10.4)
CHLORIDE SERPL-SCNC: 101 MMOL/L (ref 98–107)
COLOR UR AUTO: ABNORMAL
CREAT SERPL-MCNC: 0.9 MG/DL (ref 0.51–0.95)
EGFRCR SERPLBLD CKD-EPI 2021: 69 ML/MIN/1.73M2
EOSINOPHIL # BLD AUTO: 0 10E3/UL (ref 0–0.7)
EOSINOPHIL NFR BLD AUTO: 0 %
ERYTHROCYTE [DISTWIDTH] IN BLOOD BY AUTOMATED COUNT: 13.4 % (ref 10–15)
GLUCOSE SERPL-MCNC: 162 MG/DL (ref 70–99)
GLUCOSE UR STRIP-MCNC: 30 MG/DL
HCO3 SERPL-SCNC: 20 MMOL/L (ref 22–29)
HCT VFR BLD AUTO: 44.3 % (ref 35–47)
HGB BLD-MCNC: 14.7 G/DL (ref 11.7–15.7)
HGB UR QL STRIP: NEGATIVE
HOLD SPECIMEN: NORMAL
IMM GRANULOCYTES # BLD: 0 10E3/UL
IMM GRANULOCYTES NFR BLD: 0 %
KETONES UR STRIP-MCNC: 10 MG/DL
LACTATE SERPL-SCNC: 3.7 MMOL/L (ref 0.7–2)
LACTATE SERPL-SCNC: 6.3 MMOL/L (ref 0.7–2)
LEUKOCYTE ESTERASE UR QL STRIP: NEGATIVE
LIPASE SERPL-CCNC: 17 U/L (ref 13–60)
LYMPHOCYTES # BLD AUTO: 0.7 10E3/UL (ref 0.8–5.3)
LYMPHOCYTES NFR BLD AUTO: 7 %
MCH RBC QN AUTO: 27.5 PG (ref 26.5–33)
MCHC RBC AUTO-ENTMCNC: 33.2 G/DL (ref 31.5–36.5)
MCV RBC AUTO: 83 FL (ref 78–100)
MONOCYTES # BLD AUTO: 0.1 10E3/UL (ref 0–1.3)
MONOCYTES NFR BLD AUTO: 1 %
MUCOUS THREADS #/AREA URNS LPF: PRESENT /LPF
NEUTROPHILS # BLD AUTO: 9.5 10E3/UL (ref 1.6–8.3)
NEUTROPHILS NFR BLD AUTO: 92 %
NITRATE UR QL: NEGATIVE
NRBC # BLD AUTO: 0 10E3/UL
NRBC BLD AUTO-RTO: 0 /100
PH UR STRIP: 5.5 [PH] (ref 5–9)
PLATELET # BLD AUTO: 191 10E3/UL (ref 150–450)
POTASSIUM SERPL-SCNC: 4.1 MMOL/L (ref 3.4–5.3)
PROT SERPL-MCNC: 7.6 G/DL (ref 6.4–8.3)
RBC # BLD AUTO: 5.35 10E6/UL (ref 3.8–5.2)
RBC URINE: 2 /HPF
SODIUM SERPL-SCNC: 137 MMOL/L (ref 135–145)
SP GR UR STRIP: 1.02 (ref 1–1.03)
UROBILINOGEN UR STRIP-MCNC: NORMAL MG/DL
WBC # BLD AUTO: 10.3 10E3/UL (ref 4–11)
WBC URINE: 2 /HPF

## 2025-07-06 PROCEDURE — 85025 COMPLETE CBC W/AUTO DIFF WBC: CPT | Performed by: EMERGENCY MEDICINE

## 2025-07-06 PROCEDURE — 82040 ASSAY OF SERUM ALBUMIN: CPT | Performed by: EMERGENCY MEDICINE

## 2025-07-06 PROCEDURE — 99291 CRITICAL CARE FIRST HOUR: CPT | Mod: 25 | Performed by: EMERGENCY MEDICINE

## 2025-07-06 PROCEDURE — 83690 ASSAY OF LIPASE: CPT | Performed by: EMERGENCY MEDICINE

## 2025-07-06 PROCEDURE — 96376 TX/PRO/DX INJ SAME DRUG ADON: CPT | Performed by: EMERGENCY MEDICINE

## 2025-07-06 PROCEDURE — 81001 URINALYSIS AUTO W/SCOPE: CPT | Performed by: EMERGENCY MEDICINE

## 2025-07-06 PROCEDURE — 83605 ASSAY OF LACTIC ACID: CPT | Performed by: EMERGENCY MEDICINE

## 2025-07-06 PROCEDURE — 96365 THER/PROPH/DIAG IV INF INIT: CPT | Performed by: EMERGENCY MEDICINE

## 2025-07-06 PROCEDURE — 74176 CT ABD & PELVIS W/O CONTRAST: CPT | Mod: 26 | Performed by: RADIOLOGY

## 2025-07-06 PROCEDURE — 71045 X-RAY EXAM CHEST 1 VIEW: CPT

## 2025-07-06 PROCEDURE — 36415 COLL VENOUS BLD VENIPUNCTURE: CPT | Performed by: EMERGENCY MEDICINE

## 2025-07-06 PROCEDURE — 250N000009 HC RX 250: Performed by: EMERGENCY MEDICINE

## 2025-07-06 PROCEDURE — 74176 CT ABD & PELVIS W/O CONTRAST: CPT

## 2025-07-06 PROCEDURE — 96361 HYDRATE IV INFUSION ADD-ON: CPT | Mod: XS | Performed by: EMERGENCY MEDICINE

## 2025-07-06 PROCEDURE — 99292 CRITICAL CARE ADDL 30 MIN: CPT | Performed by: EMERGENCY MEDICINE

## 2025-07-06 PROCEDURE — 99285 EMERGENCY DEPT VISIT HI MDM: CPT | Performed by: EMERGENCY MEDICINE

## 2025-07-06 PROCEDURE — 96360 HYDRATION IV INFUSION INIT: CPT | Mod: XS | Performed by: EMERGENCY MEDICINE

## 2025-07-06 PROCEDURE — 258N000003 HC RX IP 258 OP 636: Performed by: EMERGENCY MEDICINE

## 2025-07-06 PROCEDURE — 250N000011 HC RX IP 250 OP 636: Performed by: EMERGENCY MEDICINE

## 2025-07-06 PROCEDURE — 71045 X-RAY EXAM CHEST 1 VIEW: CPT | Mod: 26 | Performed by: RADIOLOGY

## 2025-07-06 PROCEDURE — 96375 TX/PRO/DX INJ NEW DRUG ADDON: CPT | Performed by: EMERGENCY MEDICINE

## 2025-07-06 PROCEDURE — 93005 ELECTROCARDIOGRAM TRACING: CPT | Performed by: EMERGENCY MEDICINE

## 2025-07-06 RX ORDER — FENTANYL CITRATE 50 UG/ML
50 INJECTION, SOLUTION INTRAMUSCULAR; INTRAVENOUS ONCE
Refills: 0 | Status: COMPLETED | OUTPATIENT
Start: 2025-07-06 | End: 2025-07-06

## 2025-07-06 RX ORDER — FENTANYL CITRATE 50 UG/ML
50 INJECTION, SOLUTION INTRAMUSCULAR; INTRAVENOUS ONCE
Status: COMPLETED | OUTPATIENT
Start: 2025-07-06 | End: 2025-07-06

## 2025-07-06 RX ORDER — DIPHENHYDRAMINE HYDROCHLORIDE 50 MG/ML
50 INJECTION, SOLUTION INTRAMUSCULAR; INTRAVENOUS EVERY 6 HOURS PRN
Status: DISCONTINUED | OUTPATIENT
Start: 2025-07-06 | End: 2025-07-09 | Stop reason: HOSPADM

## 2025-07-06 RX ORDER — KETOROLAC TROMETHAMINE 15 MG/ML
10 INJECTION, SOLUTION INTRAMUSCULAR; INTRAVENOUS ONCE
Status: COMPLETED | OUTPATIENT
Start: 2025-07-06 | End: 2025-07-06

## 2025-07-06 RX ORDER — PIPERACILLIN SODIUM, TAZOBACTAM SODIUM 3; .375 G/15ML; G/15ML
3.38 INJECTION, POWDER, LYOPHILIZED, FOR SOLUTION INTRAVENOUS ONCE
Status: COMPLETED | OUTPATIENT
Start: 2025-07-06 | End: 2025-07-06

## 2025-07-06 RX ORDER — METHYLPREDNISOLONE SODIUM SUCCINATE 125 MG/2ML
125 INJECTION INTRAMUSCULAR; INTRAVENOUS ONCE
Status: COMPLETED | OUTPATIENT
Start: 2025-07-06 | End: 2025-07-06

## 2025-07-06 RX ORDER — SODIUM CHLORIDE 9 MG/ML
INJECTION, SOLUTION INTRAVENOUS CONTINUOUS
Status: DISCONTINUED | OUTPATIENT
Start: 2025-07-06 | End: 2025-07-07

## 2025-07-06 RX ORDER — IPRATROPIUM BROMIDE AND ALBUTEROL SULFATE 2.5; .5 MG/3ML; MG/3ML
3 SOLUTION RESPIRATORY (INHALATION) ONCE
Status: COMPLETED | OUTPATIENT
Start: 2025-07-06 | End: 2025-07-06

## 2025-07-06 RX ORDER — DIPHENHYDRAMINE HCL 25 MG
50 CAPSULE ORAL EVERY 6 HOURS PRN
Status: DISCONTINUED | OUTPATIENT
Start: 2025-07-06 | End: 2025-07-06

## 2025-07-06 RX ORDER — ONDANSETRON 2 MG/ML
4 INJECTION INTRAMUSCULAR; INTRAVENOUS EVERY 30 MIN PRN
Status: DISCONTINUED | OUTPATIENT
Start: 2025-07-06 | End: 2025-07-07

## 2025-07-06 RX ORDER — SODIUM CHLORIDE, SODIUM LACTATE, POTASSIUM CHLORIDE, CALCIUM CHLORIDE 600; 310; 30; 20 MG/100ML; MG/100ML; MG/100ML; MG/100ML
INJECTION, SOLUTION INTRAVENOUS CONTINUOUS
Status: DISCONTINUED | OUTPATIENT
Start: 2025-07-06 | End: 2025-07-09 | Stop reason: HOSPADM

## 2025-07-06 RX ORDER — HYDRALAZINE HYDROCHLORIDE 20 MG/ML
20 INJECTION INTRAMUSCULAR; INTRAVENOUS ONCE
Status: COMPLETED | OUTPATIENT
Start: 2025-07-06 | End: 2025-07-06

## 2025-07-06 RX ADMIN — ONDANSETRON 4 MG: 2 INJECTION INTRAMUSCULAR; INTRAVENOUS at 21:47

## 2025-07-06 RX ADMIN — KETOROLAC TROMETHAMINE 10 MG: 15 INJECTION, SOLUTION INTRAMUSCULAR; INTRAVENOUS at 19:35

## 2025-07-06 RX ADMIN — HYDRALAZINE HYDROCHLORIDE 20 MG: 20 INJECTION INTRAMUSCULAR; INTRAVENOUS at 21:14

## 2025-07-06 RX ADMIN — SODIUM CHLORIDE, SODIUM LACTATE, POTASSIUM CHLORIDE, AND CALCIUM CHLORIDE 1000 ML: .6; .31; .03; .02 INJECTION, SOLUTION INTRAVENOUS at 21:11

## 2025-07-06 RX ADMIN — PIPERACILLIN AND TAZOBACTAM 3.38 G: 3; .375 INJECTION, POWDER, LYOPHILIZED, FOR SOLUTION INTRAVENOUS at 20:56

## 2025-07-06 RX ADMIN — SODIUM CHLORIDE: 0.9 INJECTION, SOLUTION INTRAVENOUS at 19:34

## 2025-07-06 RX ADMIN — METHYLPREDNISOLONE SODIUM SUCCINATE 125 MG: 125 INJECTION, POWDER, FOR SOLUTION INTRAMUSCULAR; INTRAVENOUS at 23:56

## 2025-07-06 RX ADMIN — FENTANYL CITRATE 50 MCG: 50 INJECTION INTRAMUSCULAR; INTRAVENOUS at 19:53

## 2025-07-06 RX ADMIN — FENTANYL CITRATE 50 MCG: 50 INJECTION INTRAMUSCULAR; INTRAVENOUS at 20:59

## 2025-07-06 RX ADMIN — DIPHENHYDRAMINE HYDROCHLORIDE 50 MG: 50 INJECTION, SOLUTION INTRAMUSCULAR; INTRAVENOUS at 21:04

## 2025-07-06 RX ADMIN — SODIUM CHLORIDE, SODIUM LACTATE, POTASSIUM CHLORIDE, AND CALCIUM CHLORIDE: .6; .31; .03; .02 INJECTION, SOLUTION INTRAVENOUS at 22:21

## 2025-07-06 RX ADMIN — IPRATROPIUM BROMIDE AND ALBUTEROL SULFATE 3 ML: .5; 3 SOLUTION RESPIRATORY (INHALATION) at 21:49

## 2025-07-06 ASSESSMENT — ENCOUNTER SYMPTOMS
FLANK PAIN: 1
TREMORS: 1
ACTIVITY CHANGE: 1
APPETITE CHANGE: 1
CHILLS: 1
SHORTNESS OF BREATH: 1
FEVER: 0
GASTROINTESTINAL NEGATIVE: 1
CARDIOVASCULAR NEGATIVE: 1
BACK PAIN: 1
DYSURIA: 1

## 2025-07-06 ASSESSMENT — COLUMBIA-SUICIDE SEVERITY RATING SCALE - C-SSRS
6. HAVE YOU EVER DONE ANYTHING, STARTED TO DO ANYTHING, OR PREPARED TO DO ANYTHING TO END YOUR LIFE?: NO
1. IN THE PAST MONTH, HAVE YOU WISHED YOU WERE DEAD OR WISHED YOU COULD GO TO SLEEP AND NOT WAKE UP?: NO
2. HAVE YOU ACTUALLY HAD ANY THOUGHTS OF KILLING YOURSELF IN THE PAST MONTH?: NO

## 2025-07-06 ASSESSMENT — ACTIVITIES OF DAILY LIVING (ADL)
ADLS_ACUITY_SCORE: 63
ADLS_ACUITY_SCORE: 59
ADLS_ACUITY_SCORE: 63

## 2025-07-06 NOTE — ED TRIAGE NOTES
"ED Nursing Triage Note (General)   ________________________________    Donna Perea is a 70 year old Female that presents to triage via private vehicle with complaints of flank and abdominal pain on the L) side.  Patient states difficulty sleeping last night due to significant pain that has persisted throughout the day. Patient states frequent urination, however, denies burning or blood in her urine. Patient states hx of multiple kidney stones. Patient was seen in the ED in April for kidney failure and sepsis. 1000mg of tylenol at 1500.   Significant symptoms had onset 24 hour(s) ago.  Vital signs:  Temp: 97.1  F (36.2  C) Temp src: Tympanic BP: (!) 180/89 Pulse: 90   Resp: 20 SpO2: 95 %     Height: 165.1 cm (5' 5\") Weight: 98.9 kg (218 lb)  Estimated body mass index is 36.28 kg/m  as calculated from the following:    Height as of this encounter: 1.651 m (5' 5\").    Weight as of this encounter: 98.9 kg (218 lb).  PRE HOSPITAL PRIOR LIVING SITUATION Alone      Triage Assessment (Adult)       Row Name 07/06/25 6635          Triage Assessment    Airway WDL WDL        Respiratory WDL    Respiratory WDL WDL        Skin Circulation/Temperature WDL    Skin Circulation/Temperature WDL WDL        Cardiac WDL    Cardiac WDL WDL     Cardiac Rhythm NSR        Peripheral/Neurovascular WDL    Peripheral Neurovascular WDL WDL     Capillary Refill, General less than/equal to 3 secs        Cognitive/Neuro/Behavioral WDL    Cognitive/Neuro/Behavioral WDL WDL        Jose Coma Scale    Best Eye Response 4-->(E4) spontaneous     Best Motor Response 6-->(M6) obeys commands     Best Verbal Response 5-->(V5) oriented     Jose Coma Scale Score 15                     "

## 2025-07-07 ENCOUNTER — ANESTHESIA EVENT (OUTPATIENT)
Dept: SURGERY | Facility: OTHER | Age: 70
End: 2025-07-07
Payer: MEDICARE

## 2025-07-07 ENCOUNTER — ANESTHESIA (OUTPATIENT)
Dept: SURGERY | Facility: OTHER | Age: 70
End: 2025-07-07
Payer: MEDICARE

## 2025-07-07 ENCOUNTER — APPOINTMENT (OUTPATIENT)
Dept: CT IMAGING | Facility: OTHER | Age: 70
DRG: 853 | End: 2025-07-07
Attending: EMERGENCY MEDICINE
Payer: MEDICARE

## 2025-07-07 ENCOUNTER — APPOINTMENT (OUTPATIENT)
Dept: GENERAL RADIOLOGY | Facility: OTHER | Age: 70
DRG: 853 | End: 2025-07-07
Attending: UROLOGY
Payer: MEDICARE

## 2025-07-07 PROBLEM — N20.1 LEFT URETERAL STONE: Status: ACTIVE | Noted: 2025-07-07

## 2025-07-07 PROBLEM — A41.9 SEVERE SEPSIS (H): Status: ACTIVE | Noted: 2025-07-07

## 2025-07-07 PROBLEM — N13.2 HYDRONEPHROSIS WITH URINARY OBSTRUCTION DUE TO URETERAL CALCULUS: Status: ACTIVE | Noted: 2025-07-07

## 2025-07-07 PROBLEM — T78.40XA ALLERGIC REACTION TO DRUG, INITIAL ENCOUNTER: Status: ACTIVE | Noted: 2025-07-07

## 2025-07-07 PROBLEM — I10 HYPERTENSION, UNSPECIFIED TYPE: Status: ACTIVE | Noted: 2025-07-07

## 2025-07-07 PROBLEM — A41.02: Status: ACTIVE | Noted: 2025-07-07

## 2025-07-07 PROBLEM — R65.20: Status: ACTIVE | Noted: 2025-07-07

## 2025-07-07 LAB
ANION GAP SERPL CALCULATED.3IONS-SCNC: 14 MMOL/L (ref 7–15)
ATRIAL RATE - MUSE: 131 BPM
BUN SERPL-MCNC: 16.4 MG/DL (ref 8–23)
CALCIUM SERPL-MCNC: 8 MG/DL (ref 8.8–10.4)
CHLORIDE SERPL-SCNC: 106 MMOL/L (ref 98–107)
CREAT SERPL-MCNC: 1.08 MG/DL (ref 0.51–0.95)
DIASTOLIC BLOOD PRESSURE - MUSE: NORMAL MMHG
EGFRCR SERPLBLD CKD-EPI 2021: 56 ML/MIN/1.73M2
ERYTHROCYTE [DISTWIDTH] IN BLOOD BY AUTOMATED COUNT: 13.9 % (ref 10–15)
GLUCOSE BLDC GLUCOMTR-MCNC: 151 MG/DL (ref 70–99)
GLUCOSE SERPL-MCNC: 156 MG/DL (ref 70–99)
HCO3 SERPL-SCNC: 19 MMOL/L (ref 22–29)
HCT VFR BLD AUTO: 39 % (ref 35–47)
HGB BLD-MCNC: 12.5 G/DL (ref 11.7–15.7)
INTERPRETATION ECG - MUSE: NORMAL
LACTATE SERPL-SCNC: 3.4 MMOL/L (ref 0.7–2)
LACTATE SERPL-SCNC: 4.1 MMOL/L (ref 0.7–2)
LACTATE SERPL-SCNC: 4.7 MMOL/L (ref 0.7–2)
LACTATE SERPL-SCNC: 5.4 MMOL/L (ref 0.7–2)
LACTATE SERPL-SCNC: 6.4 MMOL/L (ref 0.7–2)
MCH RBC QN AUTO: 27.3 PG (ref 26.5–33)
MCHC RBC AUTO-ENTMCNC: 32.1 G/DL (ref 31.5–36.5)
MCV RBC AUTO: 85 FL (ref 78–100)
P AXIS - MUSE: 59 DEGREES
PLATELET # BLD AUTO: 139 10E3/UL (ref 150–450)
POTASSIUM SERPL-SCNC: 3.6 MMOL/L (ref 3.4–5.3)
PR INTERVAL - MUSE: 154 MS
PROCALCITONIN SERPL IA-MCNC: 5.59 NG/ML
QRS DURATION - MUSE: 82 MS
QT - MUSE: 296 MS
QTC - MUSE: 437 MS
R AXIS - MUSE: 14 DEGREES
RBC # BLD AUTO: 4.58 10E6/UL (ref 3.8–5.2)
SODIUM SERPL-SCNC: 139 MMOL/L (ref 135–145)
SYSTOLIC BLOOD PRESSURE - MUSE: NORMAL MMHG
T AXIS - MUSE: 36 DEGREES
VENTRICULAR RATE- MUSE: 131 BPM
WBC # BLD AUTO: 16.4 10E3/UL (ref 4–11)

## 2025-07-07 PROCEDURE — 36415 COLL VENOUS BLD VENIPUNCTURE: CPT | Performed by: EMERGENCY MEDICINE

## 2025-07-07 PROCEDURE — C1758 CATHETER, URETERAL: HCPCS | Performed by: UROLOGY

## 2025-07-07 PROCEDURE — 52332 CYSTOSCOPY AND TREATMENT: CPT

## 2025-07-07 PROCEDURE — 83605 ASSAY OF LACTIC ACID: CPT | Performed by: INTERNAL MEDICINE

## 2025-07-07 PROCEDURE — 96361 HYDRATE IV INFUSION ADD-ON: CPT | Mod: XS | Performed by: EMERGENCY MEDICINE

## 2025-07-07 PROCEDURE — 250N000013 HC RX MED GY IP 250 OP 250 PS 637: Performed by: UROLOGY

## 2025-07-07 PROCEDURE — 80048 BASIC METABOLIC PNL TOTAL CA: CPT | Performed by: INTERNAL MEDICINE

## 2025-07-07 PROCEDURE — 250N000011 HC RX IP 250 OP 636: Performed by: INTERNAL MEDICINE

## 2025-07-07 PROCEDURE — 250N000011 HC RX IP 250 OP 636: Performed by: EMERGENCY MEDICINE

## 2025-07-07 PROCEDURE — 93010 ELECTROCARDIOGRAM REPORT: CPT | Performed by: INTERNAL MEDICINE

## 2025-07-07 PROCEDURE — 120N000001 HC R&B MED SURG/OB

## 2025-07-07 PROCEDURE — 36415 COLL VENOUS BLD VENIPUNCTURE: CPT | Performed by: INTERNAL MEDICINE

## 2025-07-07 PROCEDURE — 258N000003 HC RX IP 258 OP 636: Performed by: EMERGENCY MEDICINE

## 2025-07-07 PROCEDURE — 250N000013 HC RX MED GY IP 250 OP 250 PS 637: Performed by: INTERNAL MEDICINE

## 2025-07-07 PROCEDURE — 710N000010 HC RECOVERY PHASE 1, LEVEL 2, PER MIN: Performed by: UROLOGY

## 2025-07-07 PROCEDURE — 250N000009 HC RX 250: Performed by: EMERGENCY MEDICINE

## 2025-07-07 PROCEDURE — 250N000009 HC RX 250

## 2025-07-07 PROCEDURE — 258N000003 HC RX IP 258 OP 636: Performed by: INTERNAL MEDICINE

## 2025-07-07 PROCEDURE — 87086 URINE CULTURE/COLONY COUNT: CPT | Performed by: UROLOGY

## 2025-07-07 PROCEDURE — 71275 CT ANGIOGRAPHY CHEST: CPT | Mod: 26 | Performed by: RADIOLOGY

## 2025-07-07 PROCEDURE — 84145 PROCALCITONIN (PCT): CPT | Performed by: EMERGENCY MEDICINE

## 2025-07-07 PROCEDURE — 370N000017 HC ANESTHESIA TECHNICAL FEE, PER MIN: Performed by: UROLOGY

## 2025-07-07 PROCEDURE — 999N000157 HC STATISTIC RCP TIME EA 10 MIN

## 2025-07-07 PROCEDURE — 258N000003 HC RX IP 258 OP 636

## 2025-07-07 PROCEDURE — 250N000011 HC RX IP 250 OP 636

## 2025-07-07 PROCEDURE — 999N000179 XR SURGERY CARM FLUORO LESS THAN 5 MIN W STILLS

## 2025-07-07 PROCEDURE — 87040 BLOOD CULTURE FOR BACTERIA: CPT | Performed by: INTERNAL MEDICINE

## 2025-07-07 PROCEDURE — 52332 CYSTOSCOPY AND TREATMENT: CPT | Performed by: UROLOGY

## 2025-07-07 PROCEDURE — 99223 1ST HOSP IP/OBS HIGH 75: CPT | Mod: 25 | Performed by: UROLOGY

## 2025-07-07 PROCEDURE — 85018 HEMOGLOBIN: CPT | Performed by: INTERNAL MEDICINE

## 2025-07-07 PROCEDURE — 99222 1ST HOSP IP/OBS MODERATE 55: CPT | Performed by: INTERNAL MEDICINE

## 2025-07-07 PROCEDURE — C2617 STENT, NON-COR, TEM W/O DEL: HCPCS | Performed by: UROLOGY

## 2025-07-07 PROCEDURE — C1769 GUIDE WIRE: HCPCS | Performed by: UROLOGY

## 2025-07-07 PROCEDURE — 0T778DZ DILATION OF LEFT URETER WITH INTRALUMINAL DEVICE, VIA NATURAL OR ARTIFICIAL OPENING ENDOSCOPIC: ICD-10-PCS | Performed by: UROLOGY

## 2025-07-07 PROCEDURE — 272N000001 HC OR GENERAL SUPPLY STERILE: Performed by: UROLOGY

## 2025-07-07 PROCEDURE — 99140 ANES COMP EMERGENCY COND: CPT

## 2025-07-07 PROCEDURE — 83605 ASSAY OF LACTIC ACID: CPT | Performed by: EMERGENCY MEDICINE

## 2025-07-07 PROCEDURE — 360N000082 HC SURGERY LEVEL 2 W/ FLUORO, PER MIN: Performed by: UROLOGY

## 2025-07-07 PROCEDURE — 250N000011 HC RX IP 250 OP 636: Performed by: UROLOGY

## 2025-07-07 PROCEDURE — 71275 CT ANGIOGRAPHY CHEST: CPT

## 2025-07-07 DEVICE — STENT URETERAL CONTOUR SOFT PERCUFLEX 6FRX24CM
Type: IMPLANTABLE DEVICE | Status: NON-FUNCTIONAL
Removed: 2025-07-28

## 2025-07-07 RX ORDER — TOLTERODINE TARTRATE 2 MG/1
2 TABLET, EXTENDED RELEASE ORAL ONCE
Status: COMPLETED | OUTPATIENT
Start: 2025-07-07 | End: 2025-07-07

## 2025-07-07 RX ORDER — ONDANSETRON 4 MG/1
4 TABLET, ORALLY DISINTEGRATING ORAL EVERY 30 MIN PRN
Status: DISCONTINUED | OUTPATIENT
Start: 2025-07-07 | End: 2025-07-07 | Stop reason: HOSPADM

## 2025-07-07 RX ORDER — CEFAZOLIN SODIUM/WATER 2 G/20 ML
2 SYRINGE (ML) INTRAVENOUS SEE ADMIN INSTRUCTIONS
Status: DISCONTINUED | OUTPATIENT
Start: 2025-07-07 | End: 2025-07-07 | Stop reason: HOSPADM

## 2025-07-07 RX ORDER — ALBUTEROL SULFATE 90 UG/1
2 INHALANT RESPIRATORY (INHALATION) EVERY 4 HOURS PRN
Status: DISCONTINUED | OUTPATIENT
Start: 2025-07-07 | End: 2025-07-09 | Stop reason: HOSPADM

## 2025-07-07 RX ORDER — ONDANSETRON 2 MG/ML
INJECTION INTRAMUSCULAR; INTRAVENOUS PRN
Status: DISCONTINUED | OUTPATIENT
Start: 2025-07-07 | End: 2025-07-07

## 2025-07-07 RX ORDER — ENOXAPARIN SODIUM 100 MG/ML
40 INJECTION SUBCUTANEOUS EVERY 24 HOURS
Status: DISCONTINUED | OUTPATIENT
Start: 2025-07-07 | End: 2025-07-09 | Stop reason: HOSPADM

## 2025-07-07 RX ORDER — CEFAZOLIN SODIUM/WATER 2 G/20 ML
2 SYRINGE (ML) INTRAVENOUS
Status: DISCONTINUED | OUTPATIENT
Start: 2025-07-07 | End: 2025-07-07 | Stop reason: HOSPADM

## 2025-07-07 RX ORDER — FENTANYL CITRATE 50 UG/ML
50 INJECTION, SOLUTION INTRAMUSCULAR; INTRAVENOUS EVERY 5 MIN PRN
Refills: 0 | Status: DISCONTINUED | OUTPATIENT
Start: 2025-07-07 | End: 2025-07-07 | Stop reason: HOSPADM

## 2025-07-07 RX ORDER — DEXAMETHASONE SODIUM PHOSPHATE 10 MG/ML
4 INJECTION, SOLUTION INTRAMUSCULAR; INTRAVENOUS
Status: DISCONTINUED | OUTPATIENT
Start: 2025-07-07 | End: 2025-07-07 | Stop reason: HOSPADM

## 2025-07-07 RX ORDER — PIPERACILLIN SODIUM, TAZOBACTAM SODIUM 3; .375 G/15ML; G/15ML
3.38 INJECTION, POWDER, LYOPHILIZED, FOR SOLUTION INTRAVENOUS ONCE
Status: COMPLETED | OUTPATIENT
Start: 2025-07-07 | End: 2025-07-07

## 2025-07-07 RX ORDER — OXYCODONE HYDROCHLORIDE 5 MG/1
5 TABLET ORAL
Status: DISCONTINUED | OUTPATIENT
Start: 2025-07-07 | End: 2025-07-07

## 2025-07-07 RX ORDER — OXYCODONE HYDROCHLORIDE 5 MG/1
5 TABLET ORAL EVERY 6 HOURS PRN
Status: DISCONTINUED | OUTPATIENT
Start: 2025-07-07 | End: 2025-07-09 | Stop reason: HOSPADM

## 2025-07-07 RX ORDER — CALCIUM CARBONATE 500 MG/1
1000 TABLET, CHEWABLE ORAL 4 TIMES DAILY PRN
Status: DISCONTINUED | OUTPATIENT
Start: 2025-07-07 | End: 2025-07-09 | Stop reason: HOSPADM

## 2025-07-07 RX ORDER — KETAMINE HYDROCHLORIDE 10 MG/ML
INJECTION INTRAMUSCULAR; INTRAVENOUS PRN
Status: DISCONTINUED | OUTPATIENT
Start: 2025-07-07 | End: 2025-07-07

## 2025-07-07 RX ORDER — DEXAMETHASONE SODIUM PHOSPHATE 4 MG/ML
INJECTION, SOLUTION INTRA-ARTICULAR; INTRALESIONAL; INTRAMUSCULAR; INTRAVENOUS; SOFT TISSUE PRN
Status: DISCONTINUED | OUTPATIENT
Start: 2025-07-07 | End: 2025-07-07

## 2025-07-07 RX ORDER — NALOXONE HYDROCHLORIDE 0.4 MG/ML
0.1 INJECTION, SOLUTION INTRAMUSCULAR; INTRAVENOUS; SUBCUTANEOUS
Status: DISCONTINUED | OUTPATIENT
Start: 2025-07-07 | End: 2025-07-07

## 2025-07-07 RX ORDER — ACETAMINOPHEN 500 MG
1000 TABLET ORAL EVERY 6 HOURS PRN
Status: ON HOLD | COMMUNITY
End: 2025-07-28

## 2025-07-07 RX ORDER — PROPOFOL 10 MG/ML
INJECTION, EMULSION INTRAVENOUS PRN
Status: DISCONTINUED | OUTPATIENT
Start: 2025-07-07 | End: 2025-07-07

## 2025-07-07 RX ORDER — PROPOFOL 10 MG/ML
INJECTION, EMULSION INTRAVENOUS CONTINUOUS PRN
Status: DISCONTINUED | OUTPATIENT
Start: 2025-07-07 | End: 2025-07-07

## 2025-07-07 RX ORDER — PIPERACILLIN SODIUM, TAZOBACTAM SODIUM 3; .375 G/15ML; G/15ML
3.38 INJECTION, POWDER, LYOPHILIZED, FOR SOLUTION INTRAVENOUS EVERY 6 HOURS
Status: DISCONTINUED | OUTPATIENT
Start: 2025-07-07 | End: 2025-07-07

## 2025-07-07 RX ORDER — NALOXONE HYDROCHLORIDE 0.4 MG/ML
0.1 INJECTION, SOLUTION INTRAMUSCULAR; INTRAVENOUS; SUBCUTANEOUS
Status: DISCONTINUED | OUTPATIENT
Start: 2025-07-07 | End: 2025-07-07 | Stop reason: HOSPADM

## 2025-07-07 RX ORDER — LIDOCAINE HYDROCHLORIDE 20 MG/ML
INJECTION, SOLUTION INFILTRATION; PERINEURAL PRN
Status: DISCONTINUED | OUTPATIENT
Start: 2025-07-07 | End: 2025-07-07

## 2025-07-07 RX ORDER — AMOXICILLIN 250 MG
2 CAPSULE ORAL 2 TIMES DAILY PRN
Status: DISCONTINUED | OUTPATIENT
Start: 2025-07-07 | End: 2025-07-09 | Stop reason: HOSPADM

## 2025-07-07 RX ORDER — FENTANYL CITRATE 50 UG/ML
25 INJECTION, SOLUTION INTRAMUSCULAR; INTRAVENOUS EVERY 5 MIN PRN
Refills: 0 | Status: DISCONTINUED | OUTPATIENT
Start: 2025-07-07 | End: 2025-07-07 | Stop reason: HOSPADM

## 2025-07-07 RX ORDER — ONDANSETRON 2 MG/ML
4 INJECTION INTRAMUSCULAR; INTRAVENOUS EVERY 30 MIN PRN
Status: DISCONTINUED | OUTPATIENT
Start: 2025-07-07 | End: 2025-07-07

## 2025-07-07 RX ORDER — ONDANSETRON 2 MG/ML
4 INJECTION INTRAMUSCULAR; INTRAVENOUS EVERY 30 MIN PRN
Status: DISCONTINUED | OUTPATIENT
Start: 2025-07-07 | End: 2025-07-07 | Stop reason: HOSPADM

## 2025-07-07 RX ORDER — ONDANSETRON 4 MG/1
4 TABLET, ORALLY DISINTEGRATING ORAL EVERY 30 MIN PRN
Status: DISCONTINUED | OUTPATIENT
Start: 2025-07-07 | End: 2025-07-07

## 2025-07-07 RX ORDER — DOCUSATE SODIUM 100 MG/1
100 CAPSULE, LIQUID FILLED ORAL 2 TIMES DAILY
Status: DISCONTINUED | OUTPATIENT
Start: 2025-07-07 | End: 2025-07-09 | Stop reason: HOSPADM

## 2025-07-07 RX ORDER — LIDOCAINE 40 MG/G
CREAM TOPICAL
Status: DISCONTINUED | OUTPATIENT
Start: 2025-07-07 | End: 2025-07-07

## 2025-07-07 RX ORDER — ONDANSETRON 2 MG/ML
4 INJECTION INTRAMUSCULAR; INTRAVENOUS EVERY 6 HOURS PRN
Status: DISCONTINUED | OUTPATIENT
Start: 2025-07-07 | End: 2025-07-09 | Stop reason: HOSPADM

## 2025-07-07 RX ORDER — ONDANSETRON 4 MG/1
4 TABLET, ORALLY DISINTEGRATING ORAL EVERY 6 HOURS PRN
Status: DISCONTINUED | OUTPATIENT
Start: 2025-07-07 | End: 2025-07-09 | Stop reason: HOSPADM

## 2025-07-07 RX ORDER — HYDROMORPHONE HCL IN WATER/PF 6 MG/30 ML
0.4 PATIENT CONTROLLED ANALGESIA SYRINGE INTRAVENOUS EVERY 5 MIN PRN
Refills: 0 | Status: DISCONTINUED | OUTPATIENT
Start: 2025-07-07 | End: 2025-07-07 | Stop reason: HOSPADM

## 2025-07-07 RX ORDER — IOPAMIDOL 755 MG/ML
89 INJECTION, SOLUTION INTRAVASCULAR ONCE
Status: COMPLETED | OUTPATIENT
Start: 2025-07-07 | End: 2025-07-07

## 2025-07-07 RX ORDER — OXYCODONE HYDROCHLORIDE 5 MG/1
10 TABLET ORAL
Status: DISCONTINUED | OUTPATIENT
Start: 2025-07-07 | End: 2025-07-07

## 2025-07-07 RX ORDER — ACETAMINOPHEN 325 MG/1
975 TABLET ORAL ONCE
Status: COMPLETED | OUTPATIENT
Start: 2025-07-07 | End: 2025-07-07

## 2025-07-07 RX ORDER — IOPAMIDOL 755 MG/ML
INJECTION, SOLUTION INTRAVASCULAR PRN
Status: DISCONTINUED | OUTPATIENT
Start: 2025-07-07 | End: 2025-07-09 | Stop reason: HOSPADM

## 2025-07-07 RX ORDER — AMOXICILLIN 250 MG
1 CAPSULE ORAL 2 TIMES DAILY PRN
Status: DISCONTINUED | OUTPATIENT
Start: 2025-07-07 | End: 2025-07-09 | Stop reason: HOSPADM

## 2025-07-07 RX ORDER — FENTANYL CITRATE 50 UG/ML
INJECTION, SOLUTION INTRAMUSCULAR; INTRAVENOUS PRN
Status: DISCONTINUED | OUTPATIENT
Start: 2025-07-07 | End: 2025-07-07

## 2025-07-07 RX ORDER — ACETAMINOPHEN 650 MG/1
650 SUPPOSITORY RECTAL ONCE
Status: COMPLETED | OUTPATIENT
Start: 2025-07-07 | End: 2025-07-07

## 2025-07-07 RX ORDER — DEXAMETHASONE SODIUM PHOSPHATE 4 MG/ML
4 INJECTION, SOLUTION INTRA-ARTICULAR; INTRALESIONAL; INTRAMUSCULAR; INTRAVENOUS; SOFT TISSUE
Status: DISCONTINUED | OUTPATIENT
Start: 2025-07-07 | End: 2025-07-07

## 2025-07-07 RX ORDER — HYDROMORPHONE HCL IN WATER/PF 6 MG/30 ML
0.2 PATIENT CONTROLLED ANALGESIA SYRINGE INTRAVENOUS EVERY 5 MIN PRN
Refills: 0 | Status: DISCONTINUED | OUTPATIENT
Start: 2025-07-07 | End: 2025-07-07 | Stop reason: HOSPADM

## 2025-07-07 RX ORDER — PIPERACILLIN SODIUM, TAZOBACTAM SODIUM 4; .5 G/20ML; G/20ML
4.5 INJECTION, POWDER, LYOPHILIZED, FOR SOLUTION INTRAVENOUS EVERY 6 HOURS
Status: DISCONTINUED | OUTPATIENT
Start: 2025-07-07 | End: 2025-07-09 | Stop reason: HOSPADM

## 2025-07-07 RX ORDER — LIDOCAINE 40 MG/G
CREAM TOPICAL
Status: DISCONTINUED | OUTPATIENT
Start: 2025-07-07 | End: 2025-07-09 | Stop reason: HOSPADM

## 2025-07-07 RX ORDER — SODIUM CHLORIDE, SODIUM LACTATE, POTASSIUM CHLORIDE, CALCIUM CHLORIDE 600; 310; 30; 20 MG/100ML; MG/100ML; MG/100ML; MG/100ML
INJECTION, SOLUTION INTRAVENOUS CONTINUOUS
Status: DISCONTINUED | OUTPATIENT
Start: 2025-07-07 | End: 2025-07-07 | Stop reason: HOSPADM

## 2025-07-07 RX ORDER — SODIUM CHLORIDE 9 MG/ML
INJECTION, SOLUTION INTRAVENOUS CONTINUOUS PRN
Status: DISCONTINUED | OUTPATIENT
Start: 2025-07-07 | End: 2025-07-07

## 2025-07-07 RX ADMIN — PIPERACILLIN AND TAZOBACTAM 4.5 G: 4; .5 INJECTION, POWDER, LYOPHILIZED, FOR SOLUTION INTRAVENOUS at 14:20

## 2025-07-07 RX ADMIN — ACETAMINOPHEN 975 MG: 325 TABLET ORAL at 08:28

## 2025-07-07 RX ADMIN — SODIUM CHLORIDE 80 ML: 9 INJECTION, SOLUTION INTRAVENOUS at 01:45

## 2025-07-07 RX ADMIN — DOCUSATE SODIUM 100 MG: 100 CAPSULE, LIQUID FILLED ORAL at 10:36

## 2025-07-07 RX ADMIN — SODIUM CHLORIDE 1000 ML: 0.9 INJECTION, SOLUTION INTRAVENOUS at 07:25

## 2025-07-07 RX ADMIN — PROPOFOL 80 MCG/KG/MIN: 10 INJECTION, EMULSION INTRAVENOUS at 08:42

## 2025-07-07 RX ADMIN — SODIUM CHLORIDE, SODIUM LACTATE, POTASSIUM CHLORIDE, AND CALCIUM CHLORIDE 1710 ML: .6; .31; .03; .02 INJECTION, SOLUTION INTRAVENOUS at 11:26

## 2025-07-07 RX ADMIN — PROPOFOL 40 MG: 10 INJECTION, EMULSION INTRAVENOUS at 08:56

## 2025-07-07 RX ADMIN — PIPERACILLIN AND TAZOBACTAM 3.38 G: 3; .375 INJECTION, POWDER, FOR SOLUTION INTRAVENOUS at 08:50

## 2025-07-07 RX ADMIN — FAMOTIDINE 20 MG: 10 INJECTION, SOLUTION INTRAVENOUS at 06:00

## 2025-07-07 RX ADMIN — ONDANSETRON HYDROCHLORIDE 4 MG: 2 SOLUTION INTRAMUSCULAR; INTRAVENOUS at 08:40

## 2025-07-07 RX ADMIN — PROPOFOL 40 MG: 10 INJECTION, EMULSION INTRAVENOUS at 08:41

## 2025-07-07 RX ADMIN — Medication 10 MG: at 08:53

## 2025-07-07 RX ADMIN — Medication 10 MG: at 08:45

## 2025-07-07 RX ADMIN — LIDOCAINE HYDROCHLORIDE 100 MG: 20 INJECTION, SOLUTION INFILTRATION; PERINEURAL at 08:41

## 2025-07-07 RX ADMIN — PIPERACILLIN AND TAZOBACTAM 3.38 G: 3; .375 INJECTION, POWDER, LYOPHILIZED, FOR SOLUTION INTRAVENOUS at 02:43

## 2025-07-07 RX ADMIN — TOLTERODINE TARTRATE 2 MG: 2 TABLET, FILM COATED ORAL at 10:36

## 2025-07-07 RX ADMIN — FAMOTIDINE 20 MG: 10 INJECTION, SOLUTION INTRAVENOUS at 21:03

## 2025-07-07 RX ADMIN — DEXAMETHASONE SODIUM PHOSPHATE 4 MG: 4 INJECTION, SOLUTION INTRA-ARTICULAR; INTRALESIONAL; INTRAMUSCULAR; INTRAVENOUS; SOFT TISSUE at 08:40

## 2025-07-07 RX ADMIN — FENTANYL CITRATE 25 MCG: 50 INJECTION INTRAMUSCULAR; INTRAVENOUS at 08:53

## 2025-07-07 RX ADMIN — SODIUM CHLORIDE, SODIUM LACTATE, POTASSIUM CHLORIDE, AND CALCIUM CHLORIDE: .6; .31; .03; .02 INJECTION, SOLUTION INTRAVENOUS at 03:38

## 2025-07-07 RX ADMIN — SODIUM CHLORIDE: 0.9 INJECTION, SOLUTION INTRAVENOUS at 08:34

## 2025-07-07 RX ADMIN — SODIUM CHLORIDE, SODIUM LACTATE, POTASSIUM CHLORIDE, AND CALCIUM CHLORIDE 1000 ML: .6; .31; .03; .02 INJECTION, SOLUTION INTRAVENOUS at 13:14

## 2025-07-07 RX ADMIN — IOPAMIDOL 89 ML: 755 INJECTION, SOLUTION INTRAVENOUS at 01:45

## 2025-07-07 RX ADMIN — PIPERACILLIN AND TAZOBACTAM 4.5 G: 4; .5 INJECTION, POWDER, LYOPHILIZED, FOR SOLUTION INTRAVENOUS at 19:55

## 2025-07-07 RX ADMIN — SODIUM CHLORIDE, SODIUM LACTATE, POTASSIUM CHLORIDE, AND CALCIUM CHLORIDE: .6; .31; .03; .02 INJECTION, SOLUTION INTRAVENOUS at 15:54

## 2025-07-07 ASSESSMENT — ACTIVITIES OF DAILY LIVING (ADL)
ADLS_ACUITY_SCORE: 45
ADLS_ACUITY_SCORE: 43
ADLS_ACUITY_SCORE: 45
ADLS_ACUITY_SCORE: 63
ADLS_ACUITY_SCORE: 45
ADLS_ACUITY_SCORE: 63
ADLS_ACUITY_SCORE: 45
ADLS_ACUITY_SCORE: 43
ADLS_ACUITY_SCORE: 63
ADLS_ACUITY_SCORE: 43
ADLS_ACUITY_SCORE: 43
ADLS_ACUITY_SCORE: 45
ADLS_ACUITY_SCORE: 63
ADLS_ACUITY_SCORE: 45

## 2025-07-07 NOTE — H&P
HOSPITALIST TELEMED ADMISSION H&P    Service Date : 7/7/2025  Dr. Gabriella OLIVIER am located in [place], [place]  Donna Perea is located in [place] at [facility]     RN/Mary/Laurel on Med/Surg unit is assisting me today with this patient.    chief complaint: left flank pain    History of Present Illness:  Donna Perea is a 70 year old female  with history of kidney stones and hypertension presented to the emergency department with left flank pan.   Patient said the pain started about 4:00 in the morning on 07/06/2025.  The patient indicated that since she had had previous kidney stones in the past, she suspected that this same pain was a kidney stone.   She denied any fever, chills, nausea but she indicated that she did have some vomiting.  She denied any chest pain or shortness of breath.  She denied any burning with urination.      Emergency Room Course - in the emergency room,    In the emergency room the patient was given a liter of LR as a bolus, and initial dose of 50 mg of IV fentanyl and Zosyn 3.375 mg.      There was an incidental occurrence after the patient had received Toradol 10 mg, and fentanyl 50 mg IV, and that per the ED provider the patient became mottled in appearance and peripheral he was clamping down and appeared to be having some type of allergic reaction.  The patient was given 50 of Benadryl IV, additional IV fluids, Zofran 4 mg, DuoNeb, placed on oxygen by nasal cannula, hydralazine 20 mg IV and 225 mg of IV Solu-Medrol.  It is uncertain as to what may have caused this particular reaction, given that the patient had received the Zosyn at least 30 to 45 minutes prior to this reaction.      The patient had an unremarkable portable chest x-ray       CT abdomen and pelvis without contrast is remarkable for the following  IMPRESSION:   1.  Moderate left hydronephrosis secondary to 2 adjacent stones essentially at the UPJ; the largest of these measures 12  x 6 mm, while the smaller stone is located slightly more distal and measures 3 mm       CT chest pulmonary embolism with contrast is remarkable for the following  No evidence of pulmonary embolism.   2.  No consolidation in the lungs.  3.  Right upper lobe 6 mm pulmonary nodule. Recommend follow-up chest CT in 6-12 months.  4.  Hepatic steatosis.   5.  Partially imaged left hydronephrosis, renal edema and perinephric fat stranding, as seen on CT abdomen and pelvis a few hours ago.     CT abdomen and pelvis without contrast is remarkable for the following;     .  Moderate left hydronephrosis secondary to 2 adjacent stones essentially at the UPJ; the largest of these measures 12 x 6 mm, while the smaller stone is located slightly more distal and measures 3 mm.         Portable chest x-ray is unremarkable for any acute findings.      The patient's initial lactic acid was 6.3  with a repeat at 6.4, this was following the allergic reaction that occurred.      Procalcitonin 5.59,     Review of the CBC with platelets is  is unremarkable for leukocytosis.,  Review of a complete metabolic panel  is significant for carbon dioxide  of 20,  anion gap slightly increased at 16        Past Medical History  No past medical history on file.   Patient Active Problem List   Diagnosis    History of kidney stones    Essential hypertension    Biceps tendonitis, left    CAP (community acquired pneumonia)    FRANCIS (acute kidney injury)    Class 2 severe obesity with body mass index (BMI) of 35 to 39.9 with serious comorbidity (H)         Past Surgical History  Past Surgical History:   Procedure Laterality Date    OTHER SURGICAL HISTORY      1957,SUR38,APPENDECTOMY OPEN,intussesception and appy      Social History  Donna  reports that she quit smoking about 34 years ago. Her smoking use included cigarettes. She has never used smokeless tobacco. She reports current alcohol use. She reports that she does not use drugs.    Family  "History  Donna's family history is not on file.    Home Medications  Current Outpatient Medications   Medication Instructions    albuterol (PROAIR HFA/PROVENTIL HFA/VENTOLIN HFA) 108 (90 Base) MCG/ACT inhaler 2 puffs, Inhalation, EVERY 4 HOURS PRN    azithromycin (ZITHROMAX) 250 MG tablet 1 pill daily for 2 days    ibuprofen (ADVIL/MOTRIN) 600 mg, EVERY 8 HOURS PRN       Allergies  No Known Allergies     10 pt ROS neg except as noted in HPI    Physical Exam:  I performed all aspects of the physical examination via Telemedicine associated with two way audio and video communication.    Vital Signs: Blood pressure 102/64, pulse (!) 124, temperature 97.7  F (36.5  C), temperature source Oral, resp. rate (!) 40, height 1.651 m (5' 5\"), weight 98.9 kg (218 lb), SpO2 94%,      Physical Exam    Gen:  Well-developed, well-nourished, in no acute distress, lying semi-supine in her hospital bed.  HEENT:  NC/AT,  hearing intact to voice, NC noted in place  Resp:  No accessory muscle use, breath sounds clear; no wheezes no rales no rhonchi  Card:  No murmur, normal S1, S2   Abd:  Soft per RN exam, no TTP, non-distended, normoactive bowel sounds are present,  Skin: No rashes or skin breakdown.   Ext: No clubbing, cyanosis or edema was noted  Psych:  Not anxious, not agitated      DATA:    I&O: No intake/output data recorded.     Labs:  I have personally reviewed the following studies:  Recent Labs   Lab 07/06/25 1913   POTASSIUM 4.1   CHLORIDE 101   CO2 20*   BUN 13.8   ALBUMIN 4.1   ALKPHOS 90   AST 26   ALT 19   LIPASE 17      Recent Labs   Lab 07/06/25 1913   WBC 10.3   HGB 14.7   HCT 44.3             Imaging: ER imaging reviewed      Misc  DVT prophylaxis:  Lovenox  Code Status: Full code   Cardiac Monitoring: no cardiac telemetry  Sigala: none  Lines:  peripheral IV  Diet:  regular      ASSESSMENT/PLAN:   70-year-old female with a  left-sided 12 x 6 mm  kidney stone and hydronephrosis will be admitted for further " "medical management and stabilization.      This patient's current admission to an acute care setting is essential for the treatment of   left-sided hydronephrosis and kidney stone     The patient's recovery is dependent on the following treatments of   - urology consultation  - IV pain medication  - Zosyn 3.375 mg IV every 6  - IV Zofran  -Correcting any electrolyte abnormalities  to stabilize this condition.       The patient is at risk of developing further complications of end organ failures if not treated in an acute care setting.     I expect the patient's hospital stay to require 2 - 4 days      Our patient will be admitted under the hospitalist services and further management to be based on patient's clinical progress.       #ACTIVE PROBLEM LIST:    # Hypertension:    115/62  Stable.  - Monitor with daily vitals   - Patient does not appear to be on any medications at this time.     Clinically Significant Risk Factors Present on Admission                   # Hypertension: Noted on problem list           # Obesity: Estimated body mass index is 36.28 kg/m  as calculated from the following:    Height as of this encounter: 1.651 m (5' 5\").    Weight as of this encounter: 98.9 kg (218 lb).                          As the provider for the telehealth service, I attest that I introduced myself to the patient and provided my credentials. Based on review of the patient s chart and/or a discussion with members of the patient s treatment team, I determined that telemedicine via a real-time, two-way, interactive audio and video platform is an appropriate means of providing this service.     The encounter was approximately  10 minutes. The nurse was present for the duration of the encounter.    Chart reviewed,labs and available imaging reviewed,consultant notes reviewed. Previous available medical records have been reviewed  Care plan discussed with care team    I have seen and examined the patient today. Documentation for " this visit was completed using a template. Everything documented was personally performed today with the necessary additions, deletions and changes made as appropriate with the diagnoses being listed in no particular order of clinical relevance.    [facility]  Securely message with the [employer] Web Console (learn more here)  Text page via [facility] Paging/Directory

## 2025-07-07 NOTE — PLAN OF CARE
"Goal Outcome Evaluation: Patient is tachy, all other vss. /62 (BP Location: Right arm, Patient Position: Supine, Cuff Size: Adult Regular)   Pulse (!) 122   Temp 99.4  F (37.4  C) (Tympanic)   Resp 20   Ht 1.651 m (5' 5\")   Wt 103.6 kg (228 lb 4.8 oz)   SpO2 92%   BMI 37.99 kg/m   Patient is not having any pain or tenderness at this time.                             "

## 2025-07-07 NOTE — PROGRESS NOTES
"NSG ADMISSION NOTE    Patient admitted to room 331 at approximately 0445 via bed from emergency room. Patient was accompanied by transport tech.     Verbal SBAR report received from Janneth prior to patient arrival.     Patient trasferred to bed via self. Patient alert and oriented X 3. The patient is not having any pain.  . Admission vital signs: Blood pressure 115/62, pulse (!) 122, temperature 99.4  F (37.4  C), temperature source Tympanic, resp. rate 20, height 1.651 m (5' 5\"), weight 103.6 kg (228 lb 4.8 oz), SpO2 92%, not currently breastfeeding. Patient was oriented to plan of care, call light, bed controls, tv, telephone, bathroom, and visiting hours.     Risk Assessment    The following safety risks were identified during admission: none. Yellow risk band applied: NO.     Skin Initial Assessment    This writer admitted this patient and completed a full skin assessment and Delbert score in the Adult PCS flowsheet.   Photo documentation of skin problem and/or wound competed via Floodlight application (located under Media):  N/A    Appropriate interventions initiated as needed.            Education    Patient has a [place] to Observation order: No  Observation education completed and documented: N/A      Mary Alarcon RN      "

## 2025-07-07 NOTE — ED NOTES
Provider notified to lay eyes on pt. She returned from CT fine and within a few minutes pt started to have rigors and dusky appearance. Pt place don face mask 6L and NC 4L pt is shaking and dusky in appearance.

## 2025-07-07 NOTE — CONSULTS
[facility] Urology Consultation     Donna Perea MRN# [id number]   Age: 70 year old YOB: 1955     Date of Admission:  7/6/2025    Reason for consult: Infected left ureteral stone       Requesting PA/MD: Dr. Hedrick       Level of consult: Consult, follow and place orders           Assessment and Plan:   Assessment:   1.  Sepsis secondary to infected left UPJ stone and UTI  2.  Proximal left UPJ stone, 12 x 6 mm  3.  Moderate to severe left hydronephrosis      Plan:   Patient with a large obstructing 12 x 6 mm and 3 mm left UPJ stone with moderate to severe hydronephrosis.    Patient is exhibiting signs of sepsis with an elevated lactic acid and tachycardia.    Plan emergent left stent placement in the operating room.  Risks of bleeding, infection, or urinary urgency and frequency as well as flank pain were explained.    She understands this is temporary only and she will require definitive left ureteroscopy and stone extraction in 2 to 3 weeks once stable.    Other risks including anesthetic risks, pneumonia, DVT and PE were explained.    This point she understands and she wishes to proceed.                       Chief Complaint:        History is obtained from the patient and EMR.         History of Present Illness:   This patient is a 70 year old female admitted with nausea, vomiting and left flank pain which began Saturday afternoon.    It did progress and she presented last evening about 7 PM for evaluation.    CT scan with 12 x 6 mm obstructing left UPJ stone and additional 3 mm stone with moderate to severe hydronephrosis.    Patient is with early sepsis.  Denies fevers or chills.  Lactic acid is high as is white count.    Remote history of kidney stones which she has passed before.  No surgical intervention.              Past Medical History:        History of kidney stones     Essential hypertension    Biceps tendonitis, left    CAP (community acquired pneumonia)    FRANCIS  (acute kidney injury)    Class 2 severe obesity with body mass index (BMI) of 35 to 39.9 with serious comorbidity (H)           Past Surgical History:     Past Surgical History:   Procedure Laterality Date    OTHER SURGICAL HISTORY      1957,SUR38,APPENDECTOMY OPEN,intussesception and appy             Social History:     Social History     Tobacco Use    Smoking status: Former     Current packs/day: 0.00     Types: Cigarettes     Quit date: 1/15/1991     Years since quittin.4    Smokeless tobacco: Never   Substance Use Topics    Alcohol use: Yes     Alcohol/week: 0.0 standard drinks of alcohol     Comment: 2 glasses a week              Family History:   No family history on file.  Family history reviewed.             Allergies:   No Known Allergies          Medications:     Current Facility-Administered Medications   Medication Dose Route Frequency Provider Last Rate Last Admin    albuterol (PROVENTIL HFA/VENTOLIN HFA) inhaler  2 puff Inhalation Q4H PRN Gabriella Johnson MD        calcium carbonate (TUMS) chewable tablet 1,000 mg  1,000 mg Oral 4x Daily PRN Gabriella Johnson MD        diphenhydrAMINE (BENADRYL) injection 50 mg  50 mg Intravenous Q6H PRN Gabriella Johnson MD   50 mg at 25 2104    docusate sodium (COLACE) capsule 100 mg  100 mg Oral BID Gabriella Johnson MD        enoxaparin ANTICOAGULANT (LOVENOX) injection 40 mg  40 mg Subcutaneous Q24H Gabriella Johnson MD        famotidine (PEPCID) injection 20 mg  20 mg Intravenous Q12H Gabriella Johnson MD   20 mg at 25 0600    lactated ringers infusion   Intravenous Continuous Gabriella Johnson  mL/hr at 25 0338 New Bag at 25 0338    lidocaine (LMX4) cream   Topical Q1H PRN Gabriella Johnson MD        lidocaine 1 % 0.1-1 mL  0.1-1 mL Other Q1H PRN Gabriella Johnson MD        ondansetron (ZOFRAN ODT) ODT tab 4 mg  4 mg Oral Q6H PRN Gabriella Johnson MD        Or    ondansetron (ZOFRAN) injection 4  "mg  4 mg Intravenous Q6H PRN Gabriella Johnson MD        piperacillin-tazobactam (ZOSYN) 3.375 g vial to attach to  mL bag  3.375 g Intravenous Q6H Gabriella Johnson MD        senna-docusate (SENOKOT-S/PERICOLACE) 8.6-50 MG per tablet 1 tablet  1 tablet Oral BID PRN Gabriella Johnson MD        Or    senna-docusate (SENOKOT-S/PERICOLACE) 8.6-50 MG per tablet 2 tablet  2 tablet Oral BID PRN Gabriella Johnson MD        sodium chloride (PF) 0.9% PF flush 3 mL  3 mL Intracatheter Q8H TONEY Gabriella Johnson MD        sodium chloride (PF) 0.9% PF flush 3 mL  3 mL Intracatheter q1 min prn Gabriella Johnson MD        sodium chloride 0.9% BOLUS 1,000 mL  1,000 mL Intravenous Once Rafael Hedrick  mL/hr at 07/07/25 0725 1,000 mL at 07/07/25 0725             Review of Systems:   A comprehensive 10-point review of systems was performed and found to be negative except as described in the HPI.     /62 (BP Location: Right arm, Patient Position: Semi-Silverman's, Cuff Size: Adult Regular)   Pulse 110   Temp 98.7  F (37.1  C) (Tympanic)   Resp 18   Ht 1.651 m (5' 5\")   Wt 103.6 kg (228 lb 4.8 oz)   SpO2 (!) 91%   BMI 37.99 kg/m    General: Alert, no apparent distress, mildly obese  HEENT: No jaundice, mucous membranes moist  CV: Tacky, regular rhythm no calf tenderness  Respiratory: Normal respiratory effort, CTA bilaterally  Abdomen: Soft, nondistended, moderate tenderness left flank, mild left upper quadrant  : Deferred  Extremities: Moving all 4  Neuro: Alert and oriented x 3  Psych: Normal affect pleasant              Data:     Lab Results   Component Value Date    WBC 16.4 (H) 07/07/2025    HGB 12.5 07/07/2025    HCT 39.0 07/07/2025    MCV 85 07/07/2025     (L) 07/07/2025     Lab Results   Component Value Date    CR 1.08 (H) 07/07/2025     Radiology:  Narrative & Impression   EXAM: CT ABDOMEN PELVIS W/O CONTRAST  LOCATION: [facility]  DATE: 7/6/2025   "   INDICATION: left flank pain  COMPARISON: 3/12/2018  TECHNIQUE: CT scan of the abdomen and pelvis was performed without IV contrast. Multiplanar reformats were obtained. Dose reduction techniques were used.  CONTRAST: None.     FINDINGS:   LOWER CHEST: Normal.     HEPATOBILIARY: Normal.     PANCREAS: Normal.     SPLEEN: Splenomegaly.     ADRENAL GLANDS: Small right adrenal adenoma unchanged.     KIDNEYS/BLADDER: Right kidney is now normal, no right hydronephrosis.     Moderate left hydronephrosis and new perinephric soft tissue stranding secondary to an obstructing 12 x 10 x 6 mm UPJ stone. There is an additional 3 mm proximal ureteral stone stone 1 cm more inferior of the dominant stone.     BOWEL: Diverticulosis of the colon. No acute inflammatory change. No obstruction.      LYMPH NODES: Normal.     VASCULATURE: Normal.     PELVIC ORGANS: No pelvic mass or free fluid.     MUSCULOSKELETAL: Normal.                                                                      IMPRESSION:   1.  Moderate left hydronephrosis secondary to 2 adjacent stones essentially at the UPJ; the largest of these measures 12 x 6 mm, while the smaller stone is located slightly more distal and measures 3 mm.

## 2025-07-07 NOTE — OR NURSING
PACU Transfer Note    Donna Perea was transferred to [place] [id number] via cart.  Equipment used for transport:  O2.  Accompanied by:  RN   Prescriptions were: N/A    PACU Respiratory Event Documentation     1) Episodes of Apnea greater than or equal to 10 seconds: no    2) Bradypnea - less than 8 breaths per minute: no    3) Pain score on 0 to 10 scale: denies    4) Pain-sedation mismatch (yes or no): no    5) Repeated 02 desaturation less than 90% (yes or no): no    Anesthesia notified? (yes or no): no    Any of the above events occuring repeatedly in separate 30 minute intervals may be considered recurrent PACU respiratory events.    Patient stable and meets phase 1 discharge criteria for transport from PACU.     Amy Garrett RN

## 2025-07-07 NOTE — PHARMACY-ADMISSION MEDICATION HISTORY
Pharmacist Admission Medication History    Admission medication history is complete. The information provided in this note is only as accurate as the sources available at the time of the update.    Information Source(s): Patient via in-person interview  -Surescripts    Pertinent Information: pt denies taking any Rx medications, doesn't typically take OTC's- took Acetaminophen 1,000 mg ~3pm yesterday prior to admission for pain.    Changes made to PTA medication list:  Added:   Acetaminophen  Deleted:   Albuterol MDI  Azithromycin  Ibuprofen  Changed: None    Allergies reviewed with patient and updates made in EHR: yes- no changes made at this time; patient confirmed NKDA.      Medication History Completed By: Georges Seaman RPH 7/7/2025 11:03 AM    PTA Med List   Medication Sig Last Dose/Taking    acetaminophen (TYLENOL) 500 MG tablet Take 1,000 mg by mouth every 6 hours as needed for mild pain. 7/6/2025 at  3:00 PM

## 2025-07-07 NOTE — ED PROVIDER NOTES
History     Chief Complaint   Patient presents with    Abdominal Pain    Back Pain     HPI  Donna Perea is a 70 year old female who presents complaining of severe left flank pain that started about 4:00 this morning or about 14 hours ago.  She has a history of previous sepsis previous kidney stones.  Of the pain is most consistent with a stone.  She does not see a physician very frequently she is quite hypertensive on presentation as well.  She likely has underlying untreated hypertension.  She has not had fevers or vomiting no anterior abdominal pain she has had urinary frequency.  She is not a diabetic.  She had acute kidney injury with her episode of pneumonia which was about 3 months ago.    Allergies:  No Known Allergies    Problem List:    Patient Active Problem List    Diagnosis Date Noted    Class 2 severe obesity with body mass index (BMI) of 35 to 39.9 with serious comorbidity (H) 2025     Priority: Medium    CAP (community acquired pneumonia) 2025     Priority: Medium    FRANCIS (acute kidney injury) 2025     Priority: Medium    Biceps tendonitis, left 2018     Priority: Medium    History of kidney stones 2018     Priority: Medium    Essential hypertension 2018     Priority: Medium        Past Medical History:    No past medical history on file.    Past Surgical History:    Past Surgical History:   Procedure Laterality Date    OTHER SURGICAL HISTORY      ,SUR38,APPENDECTOMY OPEN,intussesception and appy       Family History:    No family history on file.    Social History:  Marital Status:   [4]  Social History     Tobacco Use    Smoking status: Former     Current packs/day: 0.00     Types: Cigarettes     Quit date: 1/15/1991     Years since quittin.4    Smokeless tobacco: Never   Vaping Use    Vaping status: Never Used   Substance Use Topics    Alcohol use: Yes     Alcohol/week: 0.0 standard drinks of alcohol     Comment: 2 glasses a week     Drug use:  "No     Comment: Drug use: Not Asked        Medications:    albuterol (PROAIR HFA/PROVENTIL HFA/VENTOLIN HFA) 108 (90 Base) MCG/ACT inhaler  azithromycin (ZITHROMAX) 250 MG tablet  ibuprofen (ADVIL/MOTRIN) 200 MG tablet          Review of Systems   Constitutional:  Positive for activity change, appetite change and chills. Negative for fever.   Respiratory:  Positive for shortness of breath.    Cardiovascular: Negative.    Gastrointestinal: Negative.    Genitourinary:  Positive for dysuria and flank pain.   Musculoskeletal:  Positive for back pain.   Neurological:  Positive for tremors.   All other systems reviewed and are negative.      Physical Exam   BP: (!) 180/89  Pulse: 90  Temp: 97.1  F (36.2  C)  Resp: 20  Height: 165.1 cm (5' 5\")  Weight: 98.9 kg (218 lb)  SpO2: 95 %      Physical Exam  Vitals and nursing note reviewed.   Constitutional:       General: She is not in acute distress.     Appearance: She is well-developed.   HENT:      Head: Normocephalic and atraumatic.      Mouth/Throat:      Mouth: Mucous membranes are moist.   Eyes:      General: No scleral icterus.     Conjunctiva/sclera: Conjunctivae normal.      Pupils: Pupils are equal, round, and reactive to light.   Cardiovascular:      Rate and Rhythm: Regular rhythm. Tachycardia present.      Heart sounds: Normal heart sounds.   Pulmonary:      Effort: Pulmonary effort is normal. No respiratory distress.      Breath sounds: Normal breath sounds. No wheezing.   Abdominal:      General: Abdomen is flat.      Palpations: Abdomen is soft.   Musculoskeletal:      Cervical back: Neck supple.        Back:    Skin:     General: Skin is warm and dry.      Findings: Rash present.      Comments: Mottling of extremities and abdomen   Neurological:      General: No focal deficit present.      Mental Status: She is alert and oriented to person, place, and time.      Cranial Nerves: No cranial nerve deficit.   Psychiatric:         Mood and Affect: Mood normal.    "      Behavior: Behavior normal.         ED Course        Procedures                Recent Results (from the past 24 hours)   [place] Draw    Narrative    The following orders were created for panel order [place] Draw.  Procedure                               Abnormality         Status                     ---------                               -----------         ------                     Extra Blood Culture Bottle[[id number]]                      Final result               Extra Blue Top Tube[[id number]]                             Final result               Extra Red Top Tube[[id number]]                              Final result               Extra Green Top (Lithiu...[[id number]]                      Final result               Extra Purple Top Tube[[id number]]                           Final result               Extra Green Top (Lithiu...[[id number]]                      Final result                 Please view results for these tests on the individual orders.   Extra Blood Culture Bottle   Result Value Ref Range    Hold Specimen X    Extra Blue Top Tube   Result Value Ref Range    Hold Specimen X    Extra Red Top Tube   Result Value Ref Range    Hold Specimen X    Extra Green Top (Lithium Heparin) Tube   Result Value Ref Range    Hold Specimen X    Extra Purple Top Tube   Result Value Ref Range    Hold Specimen X    Extra Green Top (Lithium Heparin) ON ICE   Result Value Ref Range    Hold Specimen X    CBC with platelets differential    Narrative    The following orders were created for panel order CBC with platelets differential.  Procedure                               Abnormality         Status                     ---------                               -----------         ------                     CBC with platelets and ...[[id number]]  Abnormal            Final result                 Please view results for these tests on the individual orders.   Comprehensive metabolic panel   Result Value Ref Range     Sodium 137 135 - 145 mmol/L    Potassium 4.1 3.4 - 5.3 mmol/L    Carbon Dioxide (CO2) 20 (L) 22 - 29 mmol/L    Anion Gap 16 (H) 7 - 15 mmol/L    Urea Nitrogen 13.8 8.0 - 23.0 mg/dL    Creatinine 0.90 0.51 - 0.95 mg/dL    GFR Estimate 69 >60 mL/min/1.73m2    Calcium 9.0 8.8 - 10.4 mg/dL    Chloride 101 98 - 107 mmol/L    Glucose 162 (H) 70 - 99 mg/dL    Alkaline Phosphatase 90 40 - 150 U/L    AST 26 0 - 45 U/L    ALT 19 0 - 50 U/L    Protein Total 7.6 6.4 - 8.3 g/dL    Albumin 4.1 3.5 - 5.2 g/dL    Bilirubin Total 0.6 <=1.2 mg/dL   Lipase   Result Value Ref Range    Lipase 17 13 - 60 U/L   CBC with platelets and differential   Result Value Ref Range    WBC Count 10.3 4.0 - 11.0 10e3/uL    RBC Count 5.35 (H) 3.80 - 5.20 10e6/uL    Hemoglobin 14.7 11.7 - 15.7 g/dL    Hematocrit 44.3 35.0 - 47.0 %    MCV 83 78 - 100 fL    MCH 27.5 26.5 - 33.0 pg    MCHC 33.2 31.5 - 36.5 g/dL    RDW 13.4 10.0 - 15.0 %    Platelet Count 191 150 - 450 10e3/uL    % Neutrophils 92 %    % Lymphocytes 7 %    % Monocytes 1 %    % Eosinophils 0 %    % Basophils 0 %    % Immature Granulocytes 0 %    NRBCs per 100 WBC 0 <1 /100    Absolute Neutrophils 9.5 (H) 1.6 - 8.3 10e3/uL    Absolute Lymphocytes 0.7 (L) 0.8 - 5.3 10e3/uL    Absolute Monocytes 0.1 0.0 - 1.3 10e3/uL    Absolute Eosinophils 0.0 0.0 - 0.7 10e3/uL    Absolute Basophils 0.0 0.0 - 0.2 10e3/uL    Absolute Immature Granulocytes 0.0 <=0.4 10e3/uL    Absolute NRBCs 0.0 10e3/uL   Lactic acid whole blood with 1x repeat in 2 hr when >2   Result Value Ref Range    Lactic Acid, Initial 3.7 (H) 0.7 - 2.0 mmol/L   [place] Draw    Narrative    The following orders were created for panel order [place] Draw.  Procedure                               Abnormality         Status                     ---------                               -----------         ------                     Extra Blood Culture Bottle[[id number]]                      Final result                 Please view results for these  tests on the individual orders.   Extra Blood Culture Bottle   Result Value Ref Range    Hold Specimen X    CT Abdomen Pelvis w/o Contrast    Narrative    EXAM: CT ABDOMEN PELVIS W/O CONTRAST  LOCATION: [facility]  DATE: 7/6/2025    INDICATION: left flank pain  COMPARISON: 3/12/2018  TECHNIQUE: CT scan of the abdomen and pelvis was performed without IV contrast. Multiplanar reformats were obtained. Dose reduction techniques were used.  CONTRAST: None.    FINDINGS:   LOWER CHEST: Normal.    HEPATOBILIARY: Normal.    PANCREAS: Normal.    SPLEEN: Splenomegaly.    ADRENAL GLANDS: Small right adrenal adenoma unchanged.    KIDNEYS/BLADDER: Right kidney is now normal, no right hydronephrosis.    Moderate left hydronephrosis and new perinephric soft tissue stranding secondary to an obstructing 12 x 10 x 6 mm UPJ stone. There is an additional 3 mm proximal ureteral stone stone 1 cm more inferior of the dominant stone.    BOWEL: Diverticulosis of the colon. No acute inflammatory change. No obstruction.     LYMPH NODES: Normal.    VASCULATURE: Normal.    PELVIC ORGANS: No pelvic mass or free fluid.    MUSCULOSKELETAL: Normal.      Impression    IMPRESSION:   1.  Moderate left hydronephrosis secondary to 2 adjacent stones essentially at the UPJ; the largest of these measures 12 x 6 mm, while the smaller stone is located slightly more distal and measures 3 mm.     UA with Microscopic reflex to Culture    Specimen: Urine, Catheter   Result Value Ref Range    Color Urine Light Yellow Colorless, Straw, Light Yellow, Yellow    Appearance Urine Clear Clear    Glucose Urine 30 (A) Negative mg/dL    Bilirubin Urine Negative Negative    Ketones Urine 10 (A) Negative mg/dL    Specific Gravity Urine 1.020 1.000 - 1.030    Blood Urine Negative Negative    pH Urine 5.5 5.0 - 9.0    Protein Albumin Urine Negative Negative mg/dL    Urobilinogen Urine Normal Normal mg/dL    Nitrite Urine Negative Negative    Leukocyte  Esterase Urine Negative Negative    Bacteria Urine Few (A) None Seen /HPF    Mucus Urine Present (A) None Seen /LPF    RBC Urine 2 <=2 /HPF    WBC Urine 2 <=5 /HPF    Narrative    Urine Culture not indicated   Lactic acid whole blood   Result Value Ref Range    Lactic Acid 6.3 (HH) 0.7 - 2.0 mmol/L   XR Chest Port 1 View    Narrative    EXAM: XR CHEST PORT 1 VIEW  LOCATION: [facility]  DATE: 7/6/2025    INDICATION: SOA  COMPARISON: April 4, 2025.      Impression    IMPRESSION: Negative chest.   Lactic acid whole blood with 1x repeat in 2 hr when >2   Result Value Ref Range    Lactic Acid, Initial 6.4 (HH) 0.7 - 2.0 mmol/L   Procalcitonin   Result Value Ref Range    Procalcitonin 5.59 (HH) <0.50 ng/mL   CT Chest Pulmonary Embolism w Contrast    Narrative    EXAM: CT CHEST PULMONARY EMBOLISM W CONTRAST  LOCATION: [facility]  DATE: 7/7/2025    INDICATION: Short of air, tachycardia  COMPARISON: Chest radiograph and CT abdomen pelvis 07/06/2025  TECHNIQUE: CT chest pulmonary angiogram during arterial phase injection of IV contrast. Multiplanar reformats and MIP reconstructions were performed. Dose reduction techniques were used.   CONTRAST: Isovue 370, 89 mL    FINDINGS:  ANGIOGRAM CHEST: No evidence of pulmonary embolism. No thoracic aortic dissection or aneurysm. Mild thoracic aortic atherosclerosis. Trace pericardial fluid present    LUNGS AND PLEURA: Minor curvilinear atelectasis or scarring in the lower lungs. No large consolidation. Right upper lobe solid pulmonary nodule measuring 6 mm, series 5 image 59. No pneumothorax or pleural effusion.    MEDIASTINUM/AXILLAE: No lymphadenopathy.    CORONARY ARTERY CALCIFICATION: None.    UPPER ABDOMEN: Hepatic steatosis. Partially imaged left hydronephrosis, renal edema and perinephric fat stranding, as seen on CT abdomen and pelvis a few hours ago.    MUSCULOSKELETAL: No evidence of acute or aggressive osseous abnormality.       Impression    IMPRESSION:  1.  No evidence of pulmonary embolism.   2.  No consolidation in the lungs.  3.  Right upper lobe 6 mm pulmonary nodule. Recommend follow-up chest CT in 6-12 months.  4.  Hepatic steatosis.   5.  Partially imaged left hydronephrosis, renal edema and perinephric fat stranding, as seen on CT abdomen and pelvis a few hours ago.      REFERENCE:  Guidelines for Management of Incidental Pulmonary Nodules Detected on CT Images: From the Fleischner Society 2017.   Guidelines apply to incidental nodules in patients who are 35 years or older.  Guidelines do not apply to lung cancer screening, patients with immunosuppression, or patients with known primary cancer.    SINGLE NODULE  Nodule size <6 mm  Low-risk patients: No follow-up needed.  High-risk patients: Optional follow-up CT at 12 months.    Nodule size 6-8 mm  Low-risk patients: Follow-up CT at 6-12 months, then consider CT at 18-24 months.  High-risk patients: Follow-up CT at 6-12 months, then at 18-24 months if no change.    Nodule size >8 mm  Either low or high-risk patients:  Consider CT, PET/CT, or tissue sampling at 3 months.         Medications   sodium chloride 0.9 % infusion (0 mLs Intravenous Stopped 7/6/25 2149)   diphenhydrAMINE (BENADRYL) injection 50 mg (50 mg Intravenous $Given 7/6/25 2104)   ondansetron (ZOFRAN) injection 4 mg (4 mg Intravenous $Given 7/6/25 2147)   lactated ringers infusion ( Intravenous $New Bag 7/6/25 2221)   piperacillin-tazobactam (ZOSYN) 3.375 g vial to attach to  mL bag (has no administration in time range)   ketorolac (TORADOL) injection 10 mg (10 mg Intravenous $Given 7/6/25 1935)   fentaNYL (PF) (SUBLIMAZE) injection 50 mcg (50 mcg Intravenous $Given 7/6/25 1953)   piperacillin-tazobactam (ZOSYN) 3.375 g vial to attach to  mL bag (0 g Intravenous Stopped 7/6/25 2149)   lactated ringers BOLUS 1,000 mL (0 mLs Intravenous Stopped 7/6/25 2221)   fentaNYL (PF) (SUBLIMAZE) injection 50 mcg (50  mcg Intravenous $Given 7/6/25 2059)   hydrALAZINE (APRESOLINE) injection 20 mg (20 mg Intravenous $Given 7/6/25 2114)   ipratropium - albuterol 0.5 mg/2.5 mg (3mg)/3 mL (DUONEB) neb solution 3 mL (3 mLs Nebulization $Given 7/6/25 2149)   methylPREDNISolone Na Suc (solu-MEDROL) injection 125 mg (125 mg Intravenous $Given 7/6/25 2356)   iopamidol (ISOVUE-370) solution 89 mL (89 mLs Intravenous $Given 7/7/25 0145)   sodium chloride 0.9 % bag for CT scan flush (80 mLs As instructed $Given 7/7/25 0145)       CT Chest Pulmonary Embolism w Contrast   Final Result   IMPRESSION:   1.  No evidence of pulmonary embolism.    2.  No consolidation in the lungs.   3.  Right upper lobe 6 mm pulmonary nodule. Recommend follow-up chest CT in 6-12 months.   4.  Hepatic steatosis.    5.  Partially imaged left hydronephrosis, renal edema and perinephric fat stranding, as seen on CT abdomen and pelvis a few hours ago.         REFERENCE:   Guidelines for Management of Incidental Pulmonary Nodules Detected on CT Images: From the Fleischner Society 2017.    Guidelines apply to incidental nodules in patients who are 35 years or older.   Guidelines do not apply to lung cancer screening, patients with immunosuppression, or patients with known primary cancer.      SINGLE NODULE   Nodule size <6 mm   Low-risk patients: No follow-up needed.   High-risk patients: Optional follow-up CT at 12 months.      Nodule size 6-8 mm   Low-risk patients: Follow-up CT at 6-12 months, then consider CT at 18-24 months.   High-risk patients: Follow-up CT at 6-12 months, then at 18-24 months if no change.      Nodule size >8 mm   Either low or high-risk patients:   Consider CT, PET/CT, or tissue sampling at 3 months.         XR Chest Port 1 View   Final Result   IMPRESSION: Negative chest.      CT Abdomen Pelvis w/o Contrast   Final Result   IMPRESSION:    1.  Moderate left hydronephrosis secondary to 2 adjacent stones essentially at the UPJ; the largest of these  measures 12 x 6 mm, while the smaller stone is located slightly more distal and measures 3 mm.               Assessments & Plan (with Medical Decision Making)   70-year-old female presents with severe left flank pain.  Noncontrast CT stone protocol shows a large 12 mm proximal UPJ calculus with hydronephrosis and perinephric stranding.  No evidence for infection based on lack of fever normal white count and normal urinalysis.  She was tachycardic has elevated procalcitonin.  She was given 2 doses of Zosyn.  She was also given fentanyl and Toradol for pain which was very effective but I believe she had an allergic reaction to this.  She was given Benadryl and Solu-Medrol.  She became short of breath and had mottling.  With the tachycardia and the shortness of breath I did a CT PE protocol which was negative for PE or any other abnormality.  The plan is to admit her here continue the antibiotics fluids and repeat labs in the morning with a urology consult for consideration of emergent stenting given the size of the stone.  It is my understanding that the urologist will be here tomorrow.  Should she decompensate in any way she would need emergent transportation to [place].  I did attempt to admit her there she declined to go to [facility] and [facility] did not have a bed.  I have reviewed the nursing notes.    I have reviewed the findings, diagnosis, plan and need for follow up with the patient.          Medical Decision Making  The patient's presentation was of high complexity (acute flank pain consistent with obstructing left UPJ calculus).    The patient's evaluation involved:  ordering and/or review of 3+ test(s) in this encounter (serial lactates, CT abdomen pelvis without contrast, CT PE protocol, UA, metabolic panel, CBC)  discussion of management or test interpretation with another health professional (admitting physician Dr. Johnson)    The patient's management necessitated high risk (a decision regarding  hospitalization).        New Prescriptions    No medications on file       Final diagnoses:   Left ureteral stone   Hydronephrosis with urinary obstruction due to ureteral calculus   Hypertension, unspecified type   Allergic reaction to drug, initial encounter       7/6/2025   [facility] Center[facility] MD  07/07/25 [id number]

## 2025-07-07 NOTE — ANESTHESIA POSTPROCEDURE EVALUATION
Patient: Donna Perea    Procedure: Procedure(s):  CYSTOSCOPY, WITH LEFT RETROGRADE AND URETERAL STENT PLACEMENT       Anesthesia Type:  MAC    Note:  Disposition: Inpatient   Postop Pain Control: Uneventful            Sign Out: Well controlled pain   PONV: No   Neuro/Psych: Uneventful            Sign Out: Acceptable/Baseline neuro status   Airway/Respiratory: Uneventful            Sign Out: Acceptable/Baseline resp. status   CV/Hemodynamics: Uneventful            Sign Out: Acceptable CV status; No obvious hypovolemia; No obvious fluid overload   Other NRE: NONE   DID A NON-ROUTINE EVENT OCCUR?            Last vitals:  Vitals Value Taken Time   /70 07/07/25 09:30   Temp 97.9  F (36.6  C) 07/07/25 09:25   Pulse 105 07/07/25 09:30   Resp 22 07/07/25 09:30   SpO2 91 % 07/07/25 09:31   Vitals shown include unfiled device data.    Electronically Signed By: GISELLE BRYAN CRNA  July 7, 2025  9:43 AM

## 2025-07-07 NOTE — ANESTHESIA PREPROCEDURE EVALUATION
Anesthesia Pre-Procedure Evaluation    Patient: Donna Perea   MRN: [id number] : 1955          Procedure : Procedure(s):  CYSTOSCOPY, WITH URETERAL STENT INSERTION         No past medical history on file.   Past Surgical History:   Procedure Laterality Date    OTHER SURGICAL HISTORY      ,SUR38,APPENDECTOMY OPEN,intussesception and appy      No Known Allergies   Social History     Tobacco Use    Smoking status: Former     Current packs/day: 0.00     Types: Cigarettes     Quit date: 1/15/1991     Years since quittin.4    Smokeless tobacco: Never   Substance Use Topics    Alcohol use: Yes     Alcohol/week: 0.0 standard drinks of alcohol     Comment: 2 glasses a week       Wt Readings from Last 1 Encounters:   25 103.6 kg (228 lb 4.8 oz)        Anesthesia Evaluation   Pt has had prior anesthetic.         ROS/MED HX  ENT/Pulmonary:     (+)     MATIAS risk factors,  hypertension, obese,                                Neurologic:  - neg neurologic ROS     Cardiovascular:  - neg cardiovascular ROS   (+)  hypertension- -   -  - -                                      METS/Exercise Tolerance: 3 - Able to walk 1-2 blocks without stopping    Hematologic:  - neg hematologic  ROS     Musculoskeletal:  - neg musculoskeletal ROS     GI/Hepatic:  - neg GI/hepatic ROS     Renal/Genitourinary:     (+) renal disease,      Nephrolithiasis ,       Endo:     (+)               Obesity,       Psychiatric/Substance Use:  - neg psychiatric ROS     Infectious Disease: Comment: Vital stable   Lactic acid 5.4      Malignancy:  - neg malignancy ROS     Other:  - neg other ROS            Physical Exam  Airway  Mallampati: II  TM distance: >3 FB  Neck ROM: full  Comments: Small mouth    Cardiovascular - normal exam   Dental   (+) Edentulous      Pulmonary - normal examComments: Had pneumonia in April-resolved      Neurological - normal exam  She appears awake, alert and oriented x3.    Other Findings       OUTSIDE LABS:  CBC:  "  Lab Results   Component Value Date    WBC 16.4 (H) 07/07/2025    WBC 10.3 07/06/2025    HGB 12.5 07/07/2025    HGB 14.7 07/06/2025    HCT 39.0 07/07/2025    HCT 44.3 07/06/2025     (L) 07/07/2025     07/06/2025     BMP:   Lab Results   Component Value Date     07/07/2025     07/06/2025    POTASSIUM 3.6 07/07/2025    POTASSIUM 4.1 07/06/2025    CHLORIDE 106 07/07/2025    CHLORIDE 101 07/06/2025    CO2 19 (L) 07/07/2025    CO2 20 (L) 07/06/2025    BUN 16.4 07/07/2025    BUN 13.8 07/06/2025    CR 1.08 (H) 07/07/2025    CR 0.90 07/06/2025     (H) 07/07/2025     (H) 07/06/2025     COAGS: No results found for: \"PTT\", \"INR\", \"FIBR\"  POC: No results found for: \"BGM\", \"HCG\", \"HCGS\"  HEPATIC:   Lab Results   Component Value Date    ALBUMIN 4.1 07/06/2025    PROTTOTAL 7.6 07/06/2025    ALT 19 07/06/2025    AST 26 07/06/2025    ALKPHOS 90 07/06/2025    BILITOTAL 0.6 07/06/2025     OTHER:   Lab Results   Component Value Date    LACT 5.4 (HH) 07/07/2025    SIOBHAN 8.0 (L) 07/07/2025    MAG 1.8 04/06/2025    LIPASE 17 07/06/2025       Anesthesia Plan    ASA Status:  3, emergent      NPO Status: NPO Appropriate   Anesthesia Type: MAC.  Airway: natural airway.   Techniques and Equipment:       - Monitoring Plan: standard ASA monitoring     Consents    Anesthesia Plan(s) and associated risks, benefits, and realistic alternatives discussed. Questions answered and patient/representative(s) expressed understanding.     - Discussed:     - Discussed with:  Patient               Postoperative Care         Comments:                   GISELLE BRYAN CRNA    I have reviewed the pertinent notes and labs in the chart from the past 30 days and (re)examined the patient.  Any updates or changes from those notes are reflected in this note.    Clinically Significant Risk Factors Present on Admission           # Hypocalcemia: Lowest Ca = 8 mg/dL in last 2 days, will monitor and replace as appropriate       " "  # Hypertension: Noted on problem list           # Obesity: Estimated body mass index is 37.99 kg/m  as calculated from the following:    Height as of this encounter: 1.651 m (5' 5\").    Weight as of this encounter: 103.6 kg (228 lb 4.8 oz).                    "

## 2025-07-07 NOTE — OP NOTE
Operative report    Preoperative diagnosis: 12X 6 mm obstructing UPJ stone, acute cystitis without hematuria, early sepsis.    Postoperative diagnosis: Same    Procedure: Cystoscopy, brief for left retrograde pyelogram, left 6 X24 stent placement without tether    Anesthesia type: General    Anesthesiologist: Jackelin Butterfield CRNA    Surgeon:  Kal Mcwilliams MD    Procedure description: Consent was obtained.  The risks of bleeding, infection, sepsis, systemic urgency frequency and pain were described.  She understands this is a very serious condition.    She was taken to the OR.  General anesthesia was given.  She was placed lithotomy.  Timeout was done.  All were in agreement.  Left side was confirmed on the marking sheet and as well as on the films    Cystoscopy was done after she had been prepped and draped and placed lithotomy.  Urethra was normal.  Bladder showed evidence of cystitis.  Left ureter was seen.    No tumor in the bladder.    A brief retrograde was done but upon entering the ureter.  Purulence came out.    A 0.38 Glidewire was placed into the collecting system.  A 6 X24 stent without tether was placed over the Glidewire.  A good curl was confirmed in the kidney and the bladder.    The bladder was drained however urine culture was sent.    The procedure was ended.    EBL: None    Drains: 6 X24    Complications: None    Specimens: Urine culture    Disposition: Back to the floor for hospitalist treatment.  Planned left definitive ureteroscopy in 2 to 3 weeks.

## 2025-07-07 NOTE — PROGRESS NOTES
Preventing Falls in the Hospital (02:42)  Your health professional recommends that you watch this short online health video.  Find out why you're at risk for falling in the hospital and how to prevent falls.   Purpose: Understand what increases a person's risk of falling in the hospital and how to prevent falls.  Goal: Understand what increases a person's risk of falling in the hospital and how to prevent falls.    Watch: Scan the QR code or visit the link to view video       https://hwi.se/[email or web address]  Current as of: July 17, 2023  Content Version: 14.2 2024 [facility].   Care instructions adapted under license by your healthcare professional. If you have questions about a medical condition or this instruction, always ask your healthcare professional. [employer] disclaims any warranty or liability for your use of this information.  Preventing Falls in the Hospital

## 2025-07-07 NOTE — BRIEF OP NOTE
[facility]    Brief Operative Note    Pre-operative diagnosis: Left ureteral stone [N20.1]  Post-operative diagnosis Acute cystitis without hematuria, infected left proximal obstructing ureteral stone    Procedure: CYSTOSCOPY, WITH URETERAL STENT INSERTION, Left - Ureter    Surgeon: Surgeons and Role:     * Kal Mcwilliams MD - Primary  Anesthesia: General   Estimated Blood Loss: None    Drains: 6 X24 without tether  Specimens:   ID Type Source Tests Collected by Time Destination   A : urine culture Urine Urine, Sigala Catheter URINE CULTURE Kal Mcwilliams MD 7/7/2025  8:59 AM      Findings:   Brief retrograde only..  Purulence from the left ureter.  Complications: None.  Implants: * No implants in log *

## 2025-07-07 NOTE — ANESTHESIA CARE TRANSFER NOTE
Patient: Donna Perea    Procedure: Procedure(s):  CYSTOSCOPY, WITH LEFT RETROGRADE AND URETERAL STENT PLACEMENT       Diagnosis: Left ureteral stone [N20.1]  Diagnosis Additional Information: No value filed.    Anesthesia Type:   MAC     Note:    Oropharynx: oropharynx clear of all foreign objects and spontaneously breathing  Level of Consciousness: awake  Oxygen Supplementation: face mask  Level of Supplemental Oxygen (L/min / FiO2): 6  Independent Airway: airway patency satisfactory and stable  Dentition: dentition unchanged  Vital Signs Stable: post-procedure vital signs reviewed and stable  Report to RN Given: handoff report given  Patient transferred to: PACU    Handoff Report: Identifed the Patient, Identified the Reponsible Provider, Reviewed the pertinent medical history, Discussed the surgical course, Reviewed Intra-OP anesthesia mangement and issues during anesthesia, Set expectations for post-procedure period and Allowed opportunity for questions and acknowledgement of understanding      Vitals:  Vitals Value Taken Time   /70 07/07/25 09:30   Temp 97.9  F (36.6  C) 07/07/25 09:25   Pulse 105 07/07/25 09:30   Resp 22 07/07/25 09:30   SpO2 91 % 07/07/25 09:31   Vitals shown include unfiled device data.    Electronically Signed By: GISELLE Dueñas CRNA  July 7, 2025  9:45 AM

## 2025-07-07 NOTE — PROGRESS NOTES
Chart accessed pending admission to [facility].    Lavonne Corea RN on 7/7/2025 at 1:15 AM

## 2025-07-07 NOTE — H&P (VIEW-ONLY)
[facility] Urology Consultation     Donna Perea MRN# [id number]   Age: 70 year old YOB: 1955     Date of Admission:  7/6/2025    Reason for consult: Infected left ureteral stone       Requesting PA/MD: Dr. Hedrick       Level of consult: Consult, follow and place orders           Assessment and Plan:   Assessment:   1.  Sepsis secondary to infected left UPJ stone and UTI  2.  Proximal left UPJ stone, 12 x 6 mm  3.  Moderate to severe left hydronephrosis      Plan:   Patient with a large obstructing 12 x 6 mm and 3 mm left UPJ stone with moderate to severe hydronephrosis.    Patient is exhibiting signs of sepsis with an elevated lactic acid and tachycardia.    Plan emergent left stent placement in the operating room.  Risks of bleeding, infection, or urinary urgency and frequency as well as flank pain were explained.    She understands this is temporary only and she will require definitive left ureteroscopy and stone extraction in 2 to 3 weeks once stable.    Other risks including anesthetic risks, pneumonia, DVT and PE were explained.    This point she understands and she wishes to proceed.                       Chief Complaint:        History is obtained from the patient and EMR.         History of Present Illness:   This patient is a 70 year old female admitted with nausea, vomiting and left flank pain which began Saturday afternoon.    It did progress and she presented last evening about 7 PM for evaluation.    CT scan with 12 x 6 mm obstructing left UPJ stone and additional 3 mm stone with moderate to severe hydronephrosis.    Patient is with early sepsis.  Denies fevers or chills.  Lactic acid is high as is white count.    Remote history of kidney stones which she has passed before.  No surgical intervention.              Past Medical History:        History of kidney stones     Essential hypertension    Biceps tendonitis, left    CAP (community acquired pneumonia)    FRANCIS  (acute kidney injury)    Class 2 severe obesity with body mass index (BMI) of 35 to 39.9 with serious comorbidity (H)           Past Surgical History:     Past Surgical History:   Procedure Laterality Date    OTHER SURGICAL HISTORY      1955,SUR38,APPENDECTOMY OPEN,intussesception and appy             Social History:     Social History     Tobacco Use    Smoking status: Former     Current packs/day: 0.00     Types: Cigarettes     Quit date: 1/15/1991     Years since quittin.4    Smokeless tobacco: Never   Substance Use Topics    Alcohol use: Yes     Alcohol/week: 0.0 standard drinks of alcohol     Comment: 2 glasses a week              Family History:   No family history on file.  Family history reviewed.             Allergies:   No Known Allergies          Medications:     Current Facility-Administered Medications   Medication Dose Route Frequency Provider Last Rate Last Admin    albuterol (PROVENTIL HFA/VENTOLIN HFA) inhaler  2 puff Inhalation Q4H PRN Gabriella Johnson MD        calcium carbonate (TUMS) chewable tablet 1,000 mg  1,000 mg Oral 4x Daily PRN Gabriella Johnson MD        diphenhydrAMINE (BENADRYL) injection 50 mg  50 mg Intravenous Q6H PRN Gabriella Johnson MD   50 mg at 25 2104    docusate sodium (COLACE) capsule 100 mg  100 mg Oral BID Gabriella Johnson MD        enoxaparin ANTICOAGULANT (LOVENOX) injection 40 mg  40 mg Subcutaneous Q24H Gabriella Johnson MD        famotidine (PEPCID) injection 20 mg  20 mg Intravenous Q12H Gabriella Johnson MD   20 mg at 25 0600    lactated ringers infusion   Intravenous Continuous Gabriella Johnson  mL/hr at 25 0338 New Bag at 25 0338    lidocaine (LMX4) cream   Topical Q1H PRN Gabriella Johnson MD        lidocaine 1 % 0.1-1 mL  0.1-1 mL Other Q1H PRN Gabriella Johnson MD        ondansetron (ZOFRAN ODT) ODT tab 4 mg  4 mg Oral Q6H PRN Gabriella Johnson MD        Or    ondansetron (ZOFRAN) injection 4  "mg  4 mg Intravenous Q6H PRN Gabriella Johnson MD        piperacillin-tazobactam (ZOSYN) 3.375 g vial to attach to  mL bag  3.375 g Intravenous Q6H Gabriella Johnson MD        senna-docusate (SENOKOT-S/PERICOLACE) 8.6-50 MG per tablet 1 tablet  1 tablet Oral BID PRN Gabriella Johnson MD        Or    senna-docusate (SENOKOT-S/PERICOLACE) 8.6-50 MG per tablet 2 tablet  2 tablet Oral BID PRN Gabriella Johnson MD        sodium chloride (PF) 0.9% PF flush 3 mL  3 mL Intracatheter Q8H TONEY Gabriella Johnson MD        sodium chloride (PF) 0.9% PF flush 3 mL  3 mL Intracatheter q1 min prn Gabriella Johnson MD        sodium chloride 0.9% BOLUS 1,000 mL  1,000 mL Intravenous Once Rafael Hedrick  mL/hr at 07/07/25 0725 1,000 mL at 07/07/25 0725             Review of Systems:   A comprehensive 10-point review of systems was performed and found to be negative except as described in the HPI.     /62 (BP Location: Right arm, Patient Position: Semi-Silverman's, Cuff Size: Adult Regular)   Pulse 110   Temp 98.7  F (37.1  C) (Tympanic)   Resp 18   Ht 1.651 m (5' 5\")   Wt 103.6 kg (228 lb 4.8 oz)   SpO2 (!) 91%   BMI 37.99 kg/m    General: Alert, no apparent distress, mildly obese  HEENT: No jaundice, mucous membranes moist  CV: Tacky, regular rhythm no calf tenderness  Respiratory: Normal respiratory effort, CTA bilaterally  Abdomen: Soft, nondistended, moderate tenderness left flank, mild left upper quadrant  : Deferred  Extremities: Moving all 4  Neuro: Alert and oriented x 3  Psych: Normal affect pleasant              Data:     Lab Results   Component Value Date    WBC 16.4 (H) 07/07/2025    HGB 12.5 07/07/2025    HCT 39.0 07/07/2025    MCV 85 07/07/2025     (L) 07/07/2025     Lab Results   Component Value Date    CR 1.08 (H) 07/07/2025     Radiology:  Narrative & Impression   EXAM: CT ABDOMEN PELVIS W/O CONTRAST  LOCATION: [facility]  DATE: 7/6/2025   "   INDICATION: left flank pain  COMPARISON: 3/12/2018  TECHNIQUE: CT scan of the abdomen and pelvis was performed without IV contrast. Multiplanar reformats were obtained. Dose reduction techniques were used.  CONTRAST: None.     FINDINGS:   LOWER CHEST: Normal.     HEPATOBILIARY: Normal.     PANCREAS: Normal.     SPLEEN: Splenomegaly.     ADRENAL GLANDS: Small right adrenal adenoma unchanged.     KIDNEYS/BLADDER: Right kidney is now normal, no right hydronephrosis.     Moderate left hydronephrosis and new perinephric soft tissue stranding secondary to an obstructing 12 x 10 x 6 mm UPJ stone. There is an additional 3 mm proximal ureteral stone stone 1 cm more inferior of the dominant stone.     BOWEL: Diverticulosis of the colon. No acute inflammatory change. No obstruction.      LYMPH NODES: Normal.     VASCULATURE: Normal.     PELVIC ORGANS: No pelvic mass or free fluid.     MUSCULOSKELETAL: Normal.                                                                      IMPRESSION:   1.  Moderate left hydronephrosis secondary to 2 adjacent stones essentially at the UPJ; the largest of these measures 12 x 6 mm, while the smaller stone is located slightly more distal and measures 3 mm.

## 2025-07-07 NOTE — PHARMACY
Pharmacy- Automatic Dose Adjustment    Patient Active Problem List   Diagnosis    History of kidney stones    Essential hypertension    Biceps tendonitis, left    CAP (community acquired pneumonia)    FRANCIS (acute kidney injury)    Class 2 severe obesity with body mass index (BMI) of 35 to 39.9 with serious comorbidity (H)    Hydronephrosis with urinary obstruction due to ureteral calculus    Left ureteral stone    Allergic reaction to drug, initial encounter    Hypertension, unspecified type    Severe sepsis (H)        Relevant Labs:  Recent Labs   Lab Test 07/07/25  0637 07/06/25  1913   WBC 16.4* 10.3   HGB 12.5 14.7   * 191        CrCl: 59.5 mL/min  Weight: 103.6 kgs      Intake/Output Summary (Last 24 hours) at 7/7/2025 1102  Last data filed at 7/7/2025 1028  Gross per 24 hour   Intake 2883 ml   Output 1100 ml   Net 1783 ml          Per Automatic Dose Adjustment Protocol, will adjust:  Zosyn from 3.375 g to 4.5 g for weight >90 kgs and CrCl >40 mL/min      Will continue to follow and make adjustments accordingly. Thank You.    Homa Padilla [facility] ....................  7/7/2025   11:02 AM

## 2025-07-07 NOTE — PLAN OF CARE
"Goal Outcome Evaluation:      Plan of Care Reviewed With: patient    Overall Patient Progress: improvingOverall Patient Progress: improving         Patient is a SBA. Alert and oriented x4. Had a stent placed in left ureter in surgery today, have been straining all urine in bathroom, some clots, yellow and straw colored urine. Advanced from NPO to regular with no troubles. Patient denies pain. Lactic acid labs have been improving with LR fluid bolus. Lung sounds have some expiratory wheezes in upper lobes.Trace edema in ankles.       /79 (BP Location: Right arm, Patient Position: Semi-Silverman's, Cuff Size: Adult Regular)   Pulse 101   Temp 98.6  F (37  C) (Tympanic)   Resp 22   Ht 1.651 m (5' 5\")   Wt 103.6 kg (228 lb 4.8 oz)   SpO2 (!) 91%   BMI 37.99 kg/m        "

## 2025-07-07 NOTE — PROGRESS NOTES
"[facility]    Medicine Progress Note - Hospitalist Service    Date of Admission:  7/6/2025    Assessment & Plan   Principal Problem:    Severe sepsis (H)    Assessment: secondary to obstructing ureteral stone and hydronephrosis. Thank you to Dr. Mcwilliams for urgent stent placement. Urine culture collected intraoperatively. No blood culture obtained in ER or on admission, so will obtain now.     Plan: Continue empiric zosyn day 1   Monitor cultures   Follow lactates, adjust intravenous fluids as able    Active Problems:    Hydronephrosis with urinary obstruction due to ureteral calculus    Left ureteral stone    Assessment: present on admission     Plan: stent placed, follow up with urology in future as outpatient.      Hypertension, unspecified type    Assessment: chronic    Plan: monitor          Diet:  regular  DVT Prophylaxis: Pneumatic Compression Devices  Sigala Catheter: Not present  Lines: None     Cardiac Monitoring: None  Code Status: Full Code      Clinically Significant Risk Factors Present on Admission           # Hypocalcemia: Lowest Ca = 8 mg/dL in last 2 days, will monitor and replace as appropriate         # Hypertension: Noted on problem list           # Obesity: Estimated body mass index is 37.99 kg/m  as calculated from the following:    Height as of this encounter: 1.651 m (5' 5\").    Weight as of this encounter: 103.6 kg (228 lb 4.8 oz).              Social Drivers of Health    Tobacco Use: Medium Risk (4/11/2025)    Patient History     Smoking Tobacco Use: Former     Smokeless Tobacco Use: Never          Disposition Plan     Medically Ready for Discharge: Anticipated in 2-4 Days             Rafael Hedrick MD  Hospitalist Service  [facility]  Securely message with [employer] (more info)  Text page via [employer] Paging/Directory   ______________________________________________________________________    Interval History   Pain controlled. No dyspnea. No nausea, " vomiting     Physical Exam   Vital Signs: Temp: 98.8  F (37.1  C) Temp src: Tympanic BP: 117/67 Pulse: 104   Resp: 20 SpO2: 92 % O2 Device: None (Room air) Oxygen Delivery: 1 LPM  Weight: 228 lbs 4.8 oz    GENERAL: Comfortable, no apparent distress.  CARDIOVASCULAR: regular rate and rhythm, no murmur. No lower extremity edema   RESPIRATORY: Clear to auscultation bilaterally, no wheezes or crackles.  GI: non-tender, non-distended, normal bowel sounds.   SKIN: warm periphery, no rashes      Medical Decision Making       45 MINUTES SPENT BY ME on the date of service doing chart review, history, exam, documentation & further activities per the note.      Data     I have personally reviewed the following data over the past 24 hrs:    16.4 (H)  \   12.5   / 139 (L)     139 106 16.4 /  151 (H)   3.6 19 (L) 1.08 (H) \     ALT: 19 AST: 26 AP: 90 TBILI: 0.6   ALB: 4.1 TOT PROTEIN: 7.6 LIPASE: 17     Procal: 5.59 (HH) CRP: N/A Lactic Acid: 5.4 (HH)         Imaging results reviewed over the past 24 hrs:   Recent Results (from the past 24 hours)   CT Abdomen Pelvis w/o Contrast    Narrative    EXAM: CT ABDOMEN PELVIS W/O CONTRAST  LOCATION: [facility]  DATE: 7/6/2025    INDICATION: left flank pain  COMPARISON: 3/12/2018  TECHNIQUE: CT scan of the abdomen and pelvis was performed without IV contrast. Multiplanar reformats were obtained. Dose reduction techniques were used.  CONTRAST: None.    FINDINGS:   LOWER CHEST: Normal.    HEPATOBILIARY: Normal.    PANCREAS: Normal.    SPLEEN: Splenomegaly.    ADRENAL GLANDS: Small right adrenal adenoma unchanged.    KIDNEYS/BLADDER: Right kidney is now normal, no right hydronephrosis.    Moderate left hydronephrosis and new perinephric soft tissue stranding secondary to an obstructing 12 x 10 x 6 mm UPJ stone. There is an additional 3 mm proximal ureteral stone stone 1 cm more inferior of the dominant stone.    BOWEL: Diverticulosis of the colon. No acute inflammatory  change. No obstruction.     LYMPH NODES: Normal.    VASCULATURE: Normal.    PELVIC ORGANS: No pelvic mass or free fluid.    MUSCULOSKELETAL: Normal.      Impression    IMPRESSION:   1.  Moderate left hydronephrosis secondary to 2 adjacent stones essentially at the UPJ; the largest of these measures 12 x 6 mm, while the smaller stone is located slightly more distal and measures 3 mm.     XR Chest Port 1 View    Narrative    EXAM: XR CHEST PORT 1 VIEW  LOCATION: [facility]  DATE: 7/6/2025    INDICATION: SOA  COMPARISON: April 4, 2025.      Impression    IMPRESSION: Negative chest.   CT Chest Pulmonary Embolism w Contrast    Narrative    EXAM: CT CHEST PULMONARY EMBOLISM W CONTRAST  LOCATION: [facility]  DATE: 7/7/2025    INDICATION: Short of air, tachycardia  COMPARISON: Chest radiograph and CT abdomen pelvis 07/06/2025  TECHNIQUE: CT chest pulmonary angiogram during arterial phase injection of IV contrast. Multiplanar reformats and MIP reconstructions were performed. Dose reduction techniques were used.   CONTRAST: Isovue 370, 89 mL    FINDINGS:  ANGIOGRAM CHEST: No evidence of pulmonary embolism. No thoracic aortic dissection or aneurysm. Mild thoracic aortic atherosclerosis. Trace pericardial fluid present    LUNGS AND PLEURA: Minor curvilinear atelectasis or scarring in the lower lungs. No large consolidation. Right upper lobe solid pulmonary nodule measuring 6 mm, series 5 image 59. No pneumothorax or pleural effusion.    MEDIASTINUM/AXILLAE: No lymphadenopathy.    CORONARY ARTERY CALCIFICATION: None.    UPPER ABDOMEN: Hepatic steatosis. Partially imaged left hydronephrosis, renal edema and perinephric fat stranding, as seen on CT abdomen and pelvis a few hours ago.    MUSCULOSKELETAL: No evidence of acute or aggressive osseous abnormality.      Impression    IMPRESSION:  1.  No evidence of pulmonary embolism.   2.  No consolidation in the lungs.  3.  Right upper lobe 6  mm pulmonary nodule. Recommend follow-up chest CT in 6-12 months.  4.  Hepatic steatosis.   5.  Partially imaged left hydronephrosis, renal edema and perinephric fat stranding, as seen on CT abdomen and pelvis a few hours ago.      REFERENCE:  Guidelines for Management of Incidental Pulmonary Nodules Detected on CT Images: From the Fleischner Society 2017.   Guidelines apply to incidental nodules in patients who are 35 years or older.  Guidelines do not apply to lung cancer screening, patients with immunosuppression, or patients with known primary cancer.    SINGLE NODULE  Nodule size <6 mm  Low-risk patients: No follow-up needed.  High-risk patients: Optional follow-up CT at 12 months.    Nodule size 6-8 mm  Low-risk patients: Follow-up CT at 6-12 months, then consider CT at 18-24 months.  High-risk patients: Follow-up CT at 6-12 months, then at 18-24 months if no change.    Nodule size >8 mm  Either low or high-risk patients:  Consider CT, PET/CT, or tissue sampling at 3 months.     XR Surgery HOLLY L/T 5 Min Fluoro w Stills    Narrative    XR SURGERY HOLLY FLUORO LESS THAN 5 MIN W STILLS    HISTORY: stone    TECHNIQUE: 4 images, 17 seconds of fluoroscopy time, 12.2 mGy PSD    COMPARISON: CT 7/6/2025      Impression    IMPRESSION: Final images demonstrate a left ureteral stent in place.    DERICK OCHOA MD         SYSTEM ID:  [id number]

## 2025-07-07 NOTE — PROGRESS NOTES
07/07/25 0459   Valuables   Patient Belongings remains with patient   Patient Belongings Remaining with Patient cash/credit card;cell phone/electronics;clothing   Did you bring any home meds/supplements to the hospital?  No     A               Admission:  I am responsible for any personal items that are not sent to the safe or pharmacy.  [facility] is not responsible for loss, theft or damage of any property in my possession.    Signature:  _________________________________ Date: _______  Time: _____                                              Staff Signature:  ____________________________ Date: ________  Time: _____      2nd Staff person, if patient is unable/unwilling to sign:    Signature: ________________________________ Date: ________  Time: _____     Discharge:  [facility] has returned all of my personal belongings:    Signature: _________________________________ Date: ________  Time: _____                                          Staff Signature:  ____________________________ Date: ________  Time: _____

## 2025-07-07 NOTE — PHARMACY
Pharmacy - Transfer Medication Reconciliation     The patient's transfer medication orders have been compared to the medication administration record and to the Prior to Admissions Medications list - any noted discrepancies were resolved with the MD. Patient from OR back to med/surg.    Thank you. Pharmacy will continue to monitor.     Homa Padilla [facility] ....................  7/7/2025   11:02 AM

## 2025-07-08 ENCOUNTER — APPOINTMENT (OUTPATIENT)
Dept: PHYSICAL THERAPY | Facility: OTHER | Age: 70
DRG: 853 | End: 2025-07-08
Payer: MEDICARE

## 2025-07-08 LAB
HOLD SPECIMEN: NORMAL
HOLD SPECIMEN: NORMAL
LACTATE SERPL-SCNC: 1.3 MMOL/L (ref 0.7–2)

## 2025-07-08 PROCEDURE — 97161 PT EVAL LOW COMPLEX 20 MIN: CPT | Mod: GP

## 2025-07-08 PROCEDURE — 258N000003 HC RX IP 258 OP 636: Performed by: INTERNAL MEDICINE

## 2025-07-08 PROCEDURE — 97116 GAIT TRAINING THERAPY: CPT | Mod: GP

## 2025-07-08 PROCEDURE — 97530 THERAPEUTIC ACTIVITIES: CPT | Mod: GP

## 2025-07-08 PROCEDURE — 83605 ASSAY OF LACTIC ACID: CPT | Performed by: INTERNAL MEDICINE

## 2025-07-08 PROCEDURE — 97165 OT EVAL LOW COMPLEX 30 MIN: CPT | Mod: GO

## 2025-07-08 PROCEDURE — 99232 SBSQ HOSP IP/OBS MODERATE 35: CPT | Performed by: FAMILY MEDICINE

## 2025-07-08 PROCEDURE — 250N000013 HC RX MED GY IP 250 OP 250 PS 637: Performed by: INTERNAL MEDICINE

## 2025-07-08 PROCEDURE — 36415 COLL VENOUS BLD VENIPUNCTURE: CPT | Performed by: INTERNAL MEDICINE

## 2025-07-08 PROCEDURE — 120N000001 HC R&B MED SURG/OB

## 2025-07-08 PROCEDURE — 97535 SELF CARE MNGMENT TRAINING: CPT | Mod: GO

## 2025-07-08 PROCEDURE — 250N000011 HC RX IP 250 OP 636: Performed by: INTERNAL MEDICINE

## 2025-07-08 RX ORDER — ACETAMINOPHEN 500 MG
1000 TABLET ORAL EVERY 6 HOURS PRN
Status: DISCONTINUED | OUTPATIENT
Start: 2025-07-08 | End: 2025-07-09 | Stop reason: HOSPADM

## 2025-07-08 RX ADMIN — PIPERACILLIN AND TAZOBACTAM 4.5 G: 4; .5 INJECTION, POWDER, LYOPHILIZED, FOR SOLUTION INTRAVENOUS at 21:12

## 2025-07-08 RX ADMIN — PIPERACILLIN AND TAZOBACTAM 4.5 G: 4; .5 INJECTION, POWDER, LYOPHILIZED, FOR SOLUTION INTRAVENOUS at 01:29

## 2025-07-08 RX ADMIN — PIPERACILLIN AND TAZOBACTAM 4.5 G: 4; .5 INJECTION, POWDER, LYOPHILIZED, FOR SOLUTION INTRAVENOUS at 07:13

## 2025-07-08 RX ADMIN — FAMOTIDINE 20 MG: 10 INJECTION, SOLUTION INTRAVENOUS at 23:09

## 2025-07-08 RX ADMIN — ACETAMINOPHEN 1000 MG: 500 TABLET, FILM COATED ORAL at 07:40

## 2025-07-08 RX ADMIN — ENOXAPARIN SODIUM 40 MG: 40 INJECTION SUBCUTANEOUS at 09:56

## 2025-07-08 RX ADMIN — SODIUM CHLORIDE, SODIUM LACTATE, POTASSIUM CHLORIDE, AND CALCIUM CHLORIDE 1000 ML: .6; .31; .03; .02 INJECTION, SOLUTION INTRAVENOUS at 01:14

## 2025-07-08 RX ADMIN — FAMOTIDINE 20 MG: 10 INJECTION, SOLUTION INTRAVENOUS at 09:26

## 2025-07-08 RX ADMIN — PIPERACILLIN AND TAZOBACTAM 4.5 G: 4; .5 INJECTION, POWDER, LYOPHILIZED, FOR SOLUTION INTRAVENOUS at 13:17

## 2025-07-08 ASSESSMENT — ACTIVITIES OF DAILY LIVING (ADL)
ADLS_ACUITY_SCORE: 43
ADLS_ACUITY_SCORE: 42
ADLS_ACUITY_SCORE: 43
ADLS_ACUITY_SCORE: 42
ADLS_ACUITY_SCORE: 43
ADLS_ACUITY_SCORE: 42
ADLS_ACUITY_SCORE: 43
ADLS_ACUITY_SCORE: 43
ADLS_ACUITY_SCORE: 46
ADLS_ACUITY_SCORE: 46
ADLS_ACUITY_SCORE: 42
ADLS_ACUITY_SCORE: 43
ADLS_ACUITY_SCORE: 42
ADLS_ACUITY_SCORE: 43
ADLS_ACUITY_SCORE: 43

## 2025-07-08 NOTE — PROGRESS NOTES
07/07/25 1932   Tech Time   $Tech Time (10 minute increments) 1   Quick Adds   Quick Adds Incentive Spirometry   Incentive Spirometer (IS)   Incentive Spirometer Predicted Level (mL) 1500   Administration (IS) instruction provided, initial   Number of Repetitions (IS) 10   Level Incentive Spirometer (mL) 800   Patient Tolerance (IS) RT Babs

## 2025-07-08 NOTE — PLAN OF CARE
"Goal Outcome Evaluation:      Plan of Care Reviewed With: patient    Overall Patient Progress: improvingOverall Patient Progress: improving    Outcome Evaluation: VSS, afebrile and denies pain. walking independentally in room. Some burning with urination.    A+O x3. Rates flank pain 3/10 , tylenol given x1 this shift. Minimal urine output, bladder scan insignificant.       /80 (BP Location: Right arm, Patient Position: Sitting, Cuff Size: Adult Regular)   Pulse 82   Temp 98.4  F (36.9  C) (Tympanic)   Resp 18   Ht 1.651 m (5' 5\")   Wt 106.1 kg (233 lb 14.4 oz)   SpO2 95%   BMI 38.92 kg/m      "

## 2025-07-08 NOTE — PROGRESS NOTES
[facility]    Medicine Progress Note - Hospitalist Service    Date of Admission:  7/6/2025    Assessment & Plan   This 71 yo female with a PMH significant for kidney stones, HTN and obesity presented to the ER with complaints of left flank and abdominal pain which began the night of 7/5.  She became dusky with rigors after initial presentation and required supplemental O2 for a while.  Labs showed glucose 162, WBC 10.3, Hgb 14.7, lactic acid 3.7 but increased to 6.3 after 3 hours.  UA unremarkable but CT showed moderate left hydronephrosis secondary to 2 stones - one 12 x 6 mm, the other 3 mm in diameter.  She was seen by Dr. Mcwilliams who urgently placed a stent in the left ureter.  The following day her WBC increased to 16.4 and lactic acid level remained elevated but she is feeling much better.  Able to tolerate oral intake so stop IV fluids.    Principal Problem:    Severe sepsis (H)    Assessment: Improving    Plan: Had initial hyperlactatemia but her lactic acid level increased to 6.3 qualifying for a diagnosis of septic shock, although her VS remained stable.  After IV fluids her lactic acid finally returned to normal after reaching a peak of 6.4.  Clinically feeling much better but will await blood and urine culture results from 7/7 and trend her WBC prior to discharging.  Continue Zosyn pending these results.  She will need to follow-up with urology after discharge.    Active Problems:    Hydronephrosis with urinary obstruction due to ureteral calculus    Left ureteral stone    Assessment: Improving    Plan: After stent placement by urology.  Will follow-up with Dr. Mcwilliams after discharge.  Has had stones in the past but does not think she has had any analyzed for structure to gain some insight into how to prevent future occurrences.  Likely when this stone is removed it will be sent to pathology for evaluation.      Allergic reaction to drug, initial encounter    Assessment: Resolved     "Plan: Had what appeared to be peripheral \"clamping down\" with mottling when in the ER.  Uncertain etiology.  Resolved and has not recurred.      Hypertension, unspecified type    Assessment: Stable    Plan: Remains elevated.  Has not been on medications previously.  Continue to follow for now and if not improving consider starting medications during hospitalization vs as an OP.           Diet: Regular Diet Adult    DVT Prophylaxis: Enoxaparin (Lovenox) SQ  Sigala Catheter: Not present  Lines: None     Cardiac Monitoring: None  Code Status: Full Code      Clinically Significant Risk Factors           # Hypocalcemia: Lowest Ca = 8 mg/dL in last 2 days, will monitor and replace as appropriate         # Hypertension: Noted on problem list            # Obesity: Estimated body mass index is 38.92 kg/m  as calculated from the following:    Height as of this encounter: 1.651 m (5' 5\").    Weight as of this encounter: 106.1 kg (233 lb 14.4 oz)., PRESENT ON ADMISSION            Social Drivers of Health    Tobacco Use: Medium Risk (4/11/2025)    Patient History     Smoking Tobacco Use: Former     Smokeless Tobacco Use: Never          Disposition Plan     Medically Ready for Discharge: Anticipated Tomorrow             Tika Moe MD  Hospitalist Service  [facility]  Securely message with [employer] (more info)  Text page via [facility] Paging/Directory   ______________________________________________________________________    Interval History   Feeling much better.  No flank pain.  Not feeling ill.  Tolerating activity and oral intake.  Wants to go home today.    Physical Exam   Vital Signs: Temp: 98.4  F (36.9  C) Temp src: Tympanic BP: 134/80 Pulse: 82   Resp: 18 SpO2: 95 % O2 Device: None (Room air)    Weight: 233 lbs 14.4 oz    Constitutional: awake, alert, cooperative, no apparent distress, appears stated age, and moderately obese  Eyes: pupils equal, round and reactive to light, extra-ocular muscles " intact, and conjunctiva normal  ENT: normocephalic, atraumatic  Respiratory: no increased work of breathing, good air exchange, and clear to auscultation  Cardiovascular: regular rate and rhythm, normal S1 and S2, no murmur noted, and no edema  GI: normal bowel sounds, soft, non-distended, and non-tender  Skin: normal skin color, texture, turgor and no redness, warmth, or swelling  Musculoskeletal: there is no redness, warmth, or swelling of the joints, full range of motion noted, motor strength is 5 out of 5 all extremities bilaterally  Neurologic: Mental Status Exam:  Fund of Knowledge:  normal  Attention/Concentration:  normal  Language:  normal  Cranial Nerves:  cranial nerves II-XII are grossly intact  Motor Exam:  Motor exam is symmetrical 5 out of 5 all extremities bilaterally  Neuropsychiatric: General: normal, calm, and normal eye contact  Level of consciousness: alert / normal  Affect: normal, pleasant, and full  Orientation: oriented to self, place, time and situation  Memory and insight: normal, memory for past and recent events intact, and thought process normal    Medical Decision Making             Data

## 2025-07-08 NOTE — PLAN OF CARE
Goal Outcome Evaluation:      Plan of Care Reviewed With: patient    Overall Patient Progress: improving    Outcome Evaluation: VSS, afebrile and denies pain. Complains of burning w/urination, declines medication.

## 2025-07-09 ENCOUNTER — APPOINTMENT (OUTPATIENT)
Dept: OCCUPATIONAL THERAPY | Facility: OTHER | Age: 70
DRG: 853 | End: 2025-07-09
Payer: MEDICARE

## 2025-07-09 ENCOUNTER — APPOINTMENT (OUTPATIENT)
Dept: PHYSICAL THERAPY | Facility: OTHER | Age: 70
DRG: 853 | End: 2025-07-09
Payer: MEDICARE

## 2025-07-09 VITALS
OXYGEN SATURATION: 97 % | WEIGHT: 235.9 LBS | TEMPERATURE: 98.7 F | RESPIRATION RATE: 20 BRPM | HEIGHT: 65 IN | HEART RATE: 84 BPM | BODY MASS INDEX: 39.3 KG/M2 | SYSTOLIC BLOOD PRESSURE: 150 MMHG | DIASTOLIC BLOOD PRESSURE: 81 MMHG

## 2025-07-09 LAB
ANION GAP SERPL CALCULATED.3IONS-SCNC: 7 MMOL/L (ref 7–15)
BACTERIA UR CULT: NO GROWTH
BUN SERPL-MCNC: 24.7 MG/DL (ref 8–23)
CALCIUM SERPL-MCNC: 8.3 MG/DL (ref 8.8–10.4)
CHLORIDE SERPL-SCNC: 111 MMOL/L (ref 98–107)
CREAT SERPL-MCNC: 0.97 MG/DL (ref 0.51–0.95)
EGFRCR SERPLBLD CKD-EPI 2021: 63 ML/MIN/1.73M2
ERYTHROCYTE [DISTWIDTH] IN BLOOD BY AUTOMATED COUNT: 14.1 % (ref 10–15)
GLUCOSE SERPL-MCNC: 91 MG/DL (ref 70–99)
HCO3 SERPL-SCNC: 24 MMOL/L (ref 22–29)
HCT VFR BLD AUTO: 36.5 % (ref 35–47)
HGB BLD-MCNC: 11.7 G/DL (ref 11.7–15.7)
HOLD SPECIMEN: NORMAL
MCH RBC QN AUTO: 27.3 PG (ref 26.5–33)
MCHC RBC AUTO-ENTMCNC: 32.1 G/DL (ref 31.5–36.5)
MCV RBC AUTO: 85 FL (ref 78–100)
PLATELET # BLD AUTO: 107 10E3/UL (ref 150–450)
POTASSIUM SERPL-SCNC: 3.5 MMOL/L (ref 3.4–5.3)
RBC # BLD AUTO: 4.28 10E6/UL (ref 3.8–5.2)
SODIUM SERPL-SCNC: 142 MMOL/L (ref 135–145)
WBC # BLD AUTO: 12.7 10E3/UL (ref 4–11)

## 2025-07-09 PROCEDURE — 97116 GAIT TRAINING THERAPY: CPT | Mod: GP

## 2025-07-09 PROCEDURE — 97530 THERAPEUTIC ACTIVITIES: CPT | Mod: GO

## 2025-07-09 PROCEDURE — 250N000013 HC RX MED GY IP 250 OP 250 PS 637: Performed by: FAMILY MEDICINE

## 2025-07-09 PROCEDURE — 250N000011 HC RX IP 250 OP 636: Performed by: INTERNAL MEDICINE

## 2025-07-09 PROCEDURE — 82435 ASSAY OF BLOOD CHLORIDE: CPT | Performed by: FAMILY MEDICINE

## 2025-07-09 PROCEDURE — 36415 COLL VENOUS BLD VENIPUNCTURE: CPT | Performed by: FAMILY MEDICINE

## 2025-07-09 PROCEDURE — 999N000157 HC STATISTIC RCP TIME EA 10 MIN

## 2025-07-09 PROCEDURE — 97535 SELF CARE MNGMENT TRAINING: CPT | Mod: GO

## 2025-07-09 PROCEDURE — 85027 COMPLETE CBC AUTOMATED: CPT | Performed by: FAMILY MEDICINE

## 2025-07-09 PROCEDURE — 97530 THERAPEUTIC ACTIVITIES: CPT | Mod: GP

## 2025-07-09 PROCEDURE — 99239 HOSP IP/OBS DSCHRG MGMT >30: CPT | Performed by: FAMILY MEDICINE

## 2025-07-09 RX ORDER — CIPROFLOXACIN 500 MG/1
500 TABLET, FILM COATED ORAL 2 TIMES DAILY
Qty: 28 TABLET | Refills: 0 | Status: ON HOLD | OUTPATIENT
Start: 2025-07-09 | End: 2025-07-28

## 2025-07-09 RX ORDER — FAMOTIDINE 20 MG/1
20 TABLET, FILM COATED ORAL 2 TIMES DAILY
Status: DISCONTINUED | OUTPATIENT
Start: 2025-07-09 | End: 2025-07-09 | Stop reason: HOSPADM

## 2025-07-09 RX ADMIN — ENOXAPARIN SODIUM 40 MG: 40 INJECTION SUBCUTANEOUS at 09:30

## 2025-07-09 RX ADMIN — PIPERACILLIN AND TAZOBACTAM 4.5 G: 4; .5 INJECTION, POWDER, LYOPHILIZED, FOR SOLUTION INTRAVENOUS at 02:57

## 2025-07-09 RX ADMIN — FAMOTIDINE 20 MG: 20 TABLET, FILM COATED ORAL at 09:30

## 2025-07-09 RX ADMIN — PIPERACILLIN AND TAZOBACTAM 4.5 G: 4; .5 INJECTION, POWDER, LYOPHILIZED, FOR SOLUTION INTRAVENOUS at 07:43

## 2025-07-09 ASSESSMENT — ACTIVITIES OF DAILY LIVING (ADL)
ADLS_ACUITY_SCORE: 42

## 2025-07-09 NOTE — PROGRESS NOTES
07/08/25 1500   Appointment Info   Signing Clinician's Name / Credentials (PT) Tate Gant MPT   Living Environment   People in Home child(yakov), adult;grandchild(yakov)   Current Living Arrangements mobile home   Home Accessibility stairs to enter home   Number of Stairs, Main Entrance 4   Stair Railings, Main Entrance railings safe and in good condition   Transportation Anticipated family or friend will provide;car, drives self   Self-Care   Usual Activity Tolerance good   Current Activity Tolerance fair   Equipment Currently Used at Home none   Fall history within last six months no   General Information   Referring Physician Abhi   Patient/Family Therapy Goals Statement (PT) return home   Existing Precautions/Restrictions fall   Weight-Bearing Status - LLE full weight-bearing   Weight-Bearing Status - RLE full weight-bearing   Cognition   Affect/Mental Status (Cognition) WFL   Orientation Status (Cognition) oriented x 4   Follows Commands (Cognition) WFL   Pain Assessment   Patient Currently in Pain Yes, see Vital Sign flowsheet  (report of low back pain and burning when urinating)   Integumentary/Edema   Integumentary/Edema no deficits were identifed   Posture    Posture Not impaired   Range of Motion (ROM)   Range of Motion ROM is WFL   Strength (Manual Muscle Testing)   Strength (Manual Muscle Testing) strength is WFL   Strength Comments however, patient fatigues with activity   Bed Mobility   Impairments Contributing to Impaired Bed Mobility pain   Comment, (Bed Mobility) SBA   Transfers   Impairments Contributing to Impaired Transfers decreased strength   Comment, (Transfers) sit to stand and stand pivot with CGA to SBA with and without use of quad cane   Gait/Stairs (Locomotion)   [place] Level (Gait) contact guard   Assistive Device (Gait)   (with and without use of Fww and quad cane for energy conservation)   Distance in Feet (Gait) 200   Pattern (Gait) swing-through   Comment, (Gait/Stairs)  good gait mobility, fatigue experienced following gaitfatigue   Balance   Balance Comments good with quad cane and Fww   Sensory Examination   Sensory Perception WFL   Coordination   Coordination no deficits were identified   Muscle Tone   Muscle Tone no deficits were identified   Clinical Impression   Criteria for Skilled Therapeutic Intervention Yes, treatment indicated   PT Diagnosis (PT) impaired mobility   Influenced by the following impairments fatigue   Functional limitations due to impairments activity/gait tolerance   Clinical Presentation (PT Evaluation Complexity) stable   Clinical Decision Making (Complexity) low complexity   Planned Therapy Interventions (PT) gait training   Risk & Benefits of therapy have been explained evaluation/treatment results reviewed;risks/benefits reviewed;patient   PT Total Evaluation Time   PT Eval, Low Complexity Minutes (49450) 15   Physical Therapy Goals   PT Frequency Daily   PT Predicted Duration/Target Date for Goal Attainment 07/11/25   PT Goals Gait   PT: Gait Supervision/stand-by assist;Greater than 200 feet   PT Discharge Planning   PT Plan Continue PT   PT Discharge Recommendation (DC Rec) home with assist   PT Rationale for DC Rec Patient may benefit from assistance with ADL performance   PT Brief overview of current status Pleasant patient demonstrating functional mobilities, presently using a Fww for energy conservation; anticipate that she will be near/at baseline mobility at time of discharge and may not require any additional PT   PT Equipment Needed at Discharge cane, quad   Physical Therapy Time and Intention   Total Session Time (sum of timed and untimed services) 15

## 2025-07-09 NOTE — PROGRESS NOTES
07/09/25 1636   Appointment Info   Signing Clinician's Name / Credentials (PT) Tate Gant MPT   Interventions   Interventions Quick Adds Gait Training;Therapeutic Activity   Therapeutic Activity   Treatment Detail/Skilled Intervention bed mobiities with SBA; sit to stand and stand pivot transfers with   Gait Training   Symptoms Noted During/After Treatment (Gait Training) fatigue   Treatment Detail/Skilled Intervention ambulation in hallway   Distance in Feet 200   [place] Level (Gait Training) stand-by assist   Physical Assistance Level (Gait Training) supervision   Weight Bearing (Gait Training) full weight-bearing   Assistive Device (Gait Training) quad cane   Pattern Analysis (Gait Training) swing-through gait   Gait Analysis Deviations decreased magan   Impairments (Gait Analysis/Training) balance impaired   PT Discharge Planning   PT Plan Pateint discharging   PT Discharge Recommendation (DC Rec) home with assist   PT Rationale for DC Rec Patient may benefit from assistance with ADL performance   PT Brief overview of current status Pleasant patient demosntrating functional mobility using a narrow based quad cane without assistance; she appears near/at her baseline for activitiy tolerance/mobility and will not require any addtional PT at time of discharge   PT Equipment Needed at Discharge cane, quad

## 2025-07-09 NOTE — DISCHARGE SUMMARY
"[facility]  Hospitalist Discharge Summary      Date of Admission:  7/6/2025  Date of Discharge:  7/9/2025  Discharging Provider: Tika Moe MD  Discharge Service: Hospitalist Service    Discharge Diagnoses   Principal Problem:    Severe sepsis (H)  Active Problems:    Hydronephrosis with urinary obstruction due to ureteral calculus    Left ureteral stone    Allergic reaction to drug, initial encounter    Hypertension, unspecified type      Clinically Significant Risk Factors     # Obesity: Estimated body mass index is 39.26 kg/m  as calculated from the following:    Height as of this encounter: 1.651 m (5' 5\").    Weight as of this encounter: 107 kg (235 lb 14.4 oz).       Follow-ups Needed After Discharge   Follow-up Appointments       Follow-up and recommended labs and tests       Follow up with primary care provider, Physician No Ref-Primary, within 7 days for hospital follow- up.  No follow up labs or test are needed.    Follow up with Dr. Mcwilliams at [facility] within 2-3 weeks  regarding new diagnosis. The following labs/tests are recommended: Left ureteroscopy.            Be sure she has urology follow-up.    Unresulted Labs Ordered in the Past 30 Days of this Admission       Date and Time Order Name Status Description    7/7/2025 10:46 AM Blood Culture Peripheral blood (BC) Arm, Right Preliminary     7/7/2025 10:46 AM Blood Culture Peripheral blood (BC) Arm, Right Preliminary         These results will be followed up by PCP and urology.    Discharge Disposition   Discharged to home  Condition at discharge: Good    Hospital Course   This 69 yo female with a PMH significant for kidney stones, HTN and obesity presented to the ER with complaints of left flank and abdominal pain which began the night of 7/5.  She became dusky with rigors after initial presentation and required supplemental O2 for a while.  Labs showed glucose 162, WBC 10.3, Hgb 14.7, lactic acid 3.7 but increased to 6.3 " "after 3 hours.  UA unremarkable but CT showed moderate left hydronephrosis secondary to 2 stones - one 12 x 6 mm, the other 3 mm in diameter.  She was seen by Dr. Mcwilliams who urgently placed a stent in the left ureter.  The following day her WBC increased to 16.4 and lactic acid level remained elevated.  With additional IV fluids her lactic acid level decreased and her WBC is again decreasing.  Urine and blood cultures negative.  She feels good and is ready for discharge.    Principal Problem:    Severe sepsis (H)    Assessment: Improving    Plan: Had initial hyperlactatemia but her lactic acid level increased to 6.3 qualifying for a diagnosis of septic shock, although her VS remained stable.  After IV fluids her lactic acid finally returned to normal after reaching a peak of 6.4.  Clinically feeling much better.  Urine culture results from 7/7 with no growth.  Blood cultures negative at 2 days.  WBC trending toward normal.  Treated with Zosyn during her hospitalization and will be continued on Cipro due to there being a stent in place.  She will need to follow-up with urology in 2-3 weeks for definitive left ureteroscopy.    Active Problems:    Hydronephrosis with urinary obstruction due to ureteral calculus    Left ureteral stone    Assessment: Improving    Plan: After stent placement by urology.  Will follow-up with Dr. Mcwilliams after discharge.  Has had stones in the past but does not think she has had any analyzed for structure to gain some insight into how to prevent future occurrences.  Stone analysis from 3/12/2018 showed organic material but not typical stone composition.  Likely when this stone is removed it will be sent to pathology for evaluation.      Allergic reaction to drug, initial encounter    Assessment: Resolved    Plan: Had what appeared to be peripheral \"clamping down\" with mottling when in the ER.  Uncertain etiology.  Resolved and has not recurred.      Hypertension, unspecified type    " Assessment: Stable    Plan: Remains elevated.  Has not been on medications previously.  Continue to follow for now and if not improving consider starting medications as an OP.     Consultations This Hospital Stay   PHYSICAL THERAPY ADULT IP CONSULT  UROLOGY IP CONSULT  OCCUPATIONAL THERAPY ADULT IP CONSULT    Code Status   Full Code    Time Spent on this Encounter   I, Tika Moe MD, personally saw the patient today and spent greater than 30 minutes discharging this patient.       Tika Moe MD  [facility]  [address]  [place] [address]  Phone: [phone]  Fax: [phone]  ______________________________________________________________________    Physical Exam   Vital Signs: Temp: 98.7  F (37.1  C) Temp src: Tympanic BP: (!) 150/81 Pulse: 84   Resp: 20 SpO2: 97 % O2 Device: None (Room air)    Weight: 235 lbs 14.4 oz    Constitutional: awake, alert, cooperative, no apparent distress, appears stated age, and moderately obese  Eyes: pupils equal, round and reactive to light, extra-ocular muscles intact, and conjunctiva normal  ENT: normocephalic, atraumatic  Respiratory: no increased work of breathing, good air exchange, and clear to auscultation  Cardiovascular: regular rate and rhythm, normal S1 and S2, no murmur noted, and no edema  GI: normal bowel sounds, soft, non-distended, and non-tender  Skin: normal skin color, texture, turgor and no redness, warmth, or swelling  Musculoskeletal: there is no redness, warmth, or swelling of the joints, full range of motion noted, motor strength is 5 out of 5 all extremities bilaterally  Neurologic: Mental Status Exam:  Fund of Knowledge:  normal  Attention/Concentration:  normal  Language:  normal  Cranial Nerves:  cranial nerves II-XII are grossly intact  Motor Exam:  Motor exam is symmetrical 5 out of 5 all extremities bilaterally  Neuropsychiatric: General: normal, calm, and normal eye contact  Level of consciousness: alert  / normal  Affect: normal, pleasant, and full  Orientation: oriented to self, place, time and situation  Memory and insight: normal, memory for past and recent events intact, and thought process normal       Primary Care Physician   Physician No Ref-Primary    Discharge Orders      Adult Urology  Referral      Reason for your hospital stay    Left obstructing kidney stone.     Follow-up and recommended labs and tests     Follow up with primary care provider, Physician No Ref-Primary, within 7 days for hospital follow- up.  No follow up labs or test are needed.    Follow up with Dr. Mcwilliams at [facility] within 2-3 weeks  regarding new diagnosis. The following labs/tests are recommended: Left ureteroscopy.     Activity    Your activity upon discharge: activity as tolerated     Diet    Follow this diet upon discharge: Current Diet:Orders Placed This Encounter      Regular Diet Adult       Significant Results and Procedures   Most Recent 3 CBC's:  Recent Labs   Lab Test 07/09/25  0547 07/07/25 0637 07/06/25 1913   WBC 12.7* 16.4* 10.3   HGB 11.7 12.5 14.7   MCV 85 85 83   * 139* 191     Most Recent 3 BMP's:  Recent Labs   Lab Test 07/09/25  0547 07/07/25  0810 07/07/25  0637 07/06/25 1913     --  139 137   POTASSIUM 3.5  --  3.6 4.1   CHLORIDE 111*  --  106 101   CO2 24  --  19* 20*   BUN 24.7*  --  16.4 13.8   CR 0.97*  --  1.08* 0.90   ANIONGAP 7  --  14 16*   SIOBHAN 8.3*  --  8.0* 9.0   GLC 91 151* 156* 162*     Most Recent 2 LFT's:  Recent Labs   Lab Test 07/06/25 1913 03/12/18  1100   AST 26 22   ALT 19 24   ALKPHOS 90 73   BILITOTAL 0.6 0.7     7-Day Micro Results       Collected Updated Procedure Result Status      07/07/2025 1106 07/09/2025 1116 Blood Culture Peripheral blood (BC) Arm, Right [[id number]]   Peripheral blood (BC) from Arm, Right    Preliminary result Component Value   Culture No growth after 2 days  [P]                07/07/2025 1106 07/09/2025 1116 Blood Culture  Peripheral blood (BC) Arm, Right [[id number]]   Peripheral blood (BC) from Arm, Right    Preliminary result Component Value   Culture No growth after 2 days  [P]                07/07/2025 0859 07/09/2025 1033 Urine Culture [[id number]]   Urine, Sigala Catheter    Final result Component Value   Culture No Growth                     Most Recent Urinalysis:  Recent Labs   Lab Test 07/06/25  2130 03/12/18  0845   COLOR Light Yellow Yellow   APPEARANCE Clear Clear   URINEGLC 30* Negative   URINEBILI Negative Negative   URINEKETONE 10* Negative   SG 1.020 1.025   UBLD Negative Large*   URINEPH 5.5 7.0   PROTEIN Negative Trace*   UROBILINOGEN  --  0.2   NITRITE Negative Negative   LEUKEST Negative Negative   RBCU 2 25-50*   WBCU 2 0 - 5   ,   Results for orders placed or performed during the hospital encounter of 07/06/25   CT Abdomen Pelvis w/o Contrast    Narrative    EXAM: CT ABDOMEN PELVIS W/O CONTRAST  LOCATION: [facility]  DATE: 7/6/2025    INDICATION: left flank pain  COMPARISON: 3/12/2018  TECHNIQUE: CT scan of the abdomen and pelvis was performed without IV contrast. Multiplanar reformats were obtained. Dose reduction techniques were used.  CONTRAST: None.    FINDINGS:   LOWER CHEST: Normal.    HEPATOBILIARY: Normal.    PANCREAS: Normal.    SPLEEN: Splenomegaly.    ADRENAL GLANDS: Small right adrenal adenoma unchanged.    KIDNEYS/BLADDER: Right kidney is now normal, no right hydronephrosis.    Moderate left hydronephrosis and new perinephric soft tissue stranding secondary to an obstructing 12 x 10 x 6 mm UPJ stone. There is an additional 3 mm proximal ureteral stone stone 1 cm more inferior of the dominant stone.    BOWEL: Diverticulosis of the colon. No acute inflammatory change. No obstruction.     LYMPH NODES: Normal.    VASCULATURE: Normal.    PELVIC ORGANS: No pelvic mass or free fluid.    MUSCULOSKELETAL: Normal.      Impression    IMPRESSION:   1.  Moderate left hydronephrosis  secondary to 2 adjacent stones essentially at the UPJ; the largest of these measures 12 x 6 mm, while the smaller stone is located slightly more distal and measures 3 mm.     XR Chest Port 1 View    Narrative    EXAM: XR CHEST PORT 1 VIEW  LOCATION: [facility]  DATE: 7/6/2025    INDICATION: SOA  COMPARISON: April 4, 2025.      Impression    IMPRESSION: Negative chest.   CT Chest Pulmonary Embolism w Contrast    Narrative    EXAM: CT CHEST PULMONARY EMBOLISM W CONTRAST  LOCATION: [facility]  DATE: 7/7/2025    INDICATION: Short of air, tachycardia  COMPARISON: Chest radiograph and CT abdomen pelvis 07/06/2025  TECHNIQUE: CT chest pulmonary angiogram during arterial phase injection of IV contrast. Multiplanar reformats and MIP reconstructions were performed. Dose reduction techniques were used.   CONTRAST: Isovue 370, 89 mL    FINDINGS:  ANGIOGRAM CHEST: No evidence of pulmonary embolism. No thoracic aortic dissection or aneurysm. Mild thoracic aortic atherosclerosis. Trace pericardial fluid present    LUNGS AND PLEURA: Minor curvilinear atelectasis or scarring in the lower lungs. No large consolidation. Right upper lobe solid pulmonary nodule measuring 6 mm, series 5 image 59. No pneumothorax or pleural effusion.    MEDIASTINUM/AXILLAE: No lymphadenopathy.    CORONARY ARTERY CALCIFICATION: None.    UPPER ABDOMEN: Hepatic steatosis. Partially imaged left hydronephrosis, renal edema and perinephric fat stranding, as seen on CT abdomen and pelvis a few hours ago.    MUSCULOSKELETAL: No evidence of acute or aggressive osseous abnormality.      Impression    IMPRESSION:  1.  No evidence of pulmonary embolism.   2.  No consolidation in the lungs.  3.  Right upper lobe 6 mm pulmonary nodule. Recommend follow-up chest CT in 6-12 months.  4.  Hepatic steatosis.   5.  Partially imaged left hydronephrosis, renal edema and perinephric fat stranding, as seen on CT abdomen and pelvis a  few hours ago.      REFERENCE:  Guidelines for Management of Incidental Pulmonary Nodules Detected on CT Images: From the Fleischner Society 2017.   Guidelines apply to incidental nodules in patients who are 35 years or older.  Guidelines do not apply to lung cancer screening, patients with immunosuppression, or patients with known primary cancer.    SINGLE NODULE  Nodule size <6 mm  Low-risk patients: No follow-up needed.  High-risk patients: Optional follow-up CT at 12 months.    Nodule size 6-8 mm  Low-risk patients: Follow-up CT at 6-12 months, then consider CT at 18-24 months.  High-risk patients: Follow-up CT at 6-12 months, then at 18-24 months if no change.    Nodule size >8 mm  Either low or high-risk patients:  Consider CT, PET/CT, or tissue sampling at 3 months.     XR Surgery HOLLY L/T 5 Min Fluoro w Stills    Narrative    XR SURGERY HOLLY FLUORO LESS THAN 5 MIN W STILLS    HISTORY: stone    TECHNIQUE: 4 images, 17 seconds of fluoroscopy time, 12.2 mGy PSD    COMPARISON: CT 7/6/2025      Impression    IMPRESSION: Final images demonstrate a left ureteral stent in place.    DERICK OCHOA MD         SYSTEM ID:  [id number]       Discharge Medications      Review of your medicines        START taking        Dose / Directions   ciprofloxacin 500 MG tablet  Commonly known as: CIPRO      Dose: 500 mg  Take 1 tablet (500 mg) by mouth 2 times daily.  Quantity: 28 tablet  Refills: 0            CONTINUE these medicines which have NOT CHANGED        Dose / Directions   acetaminophen 500 MG tablet  Commonly known as: TYLENOL      Dose: 1,000 mg  Take 1,000 mg by mouth every 6 hours as needed for mild pain.  Refills: 0               Where to get your medicines        These medications were sent to [facility] - [place], [place] - [address]  [address], [place] [address]      Phone: [phone]   ciprofloxacin 500 MG tablet       Allergies   No Known Allergies   [address]  [address], [place] [address]      Phone: [phone]   ciprofloxacin 500 MG tablet       Allergies   No Known Allergies

## 2025-07-09 NOTE — PROGRESS NOTES
[facility] DISCHARGE NOTE    Patient discharged to home at 1:46 PM via wheel chair. Accompanied by daughter and staff. Discharge instructions reviewed with patient, opportunity offered to ask questions. Prescriptions sent to patients preferred pharmacy. All belongings sent with patient.    Sameera Santizo RN

## 2025-07-09 NOTE — PLAN OF CARE
Goal Outcome Evaluation:      BP elevated, VSS otherwise, denies pain stated that she has mild pain with voiding and frequency. A&Ox4, LS clear, bowel sounds audible. Trace edema to bilateral LE. Trace/small amount of blood noted to urine. Urine strained no stones noted.           Plan of Care Reviewed With: patient    Overall Patient Progress: improvingOverall Patient Progress: improving    Outcome Evaluation: BP elevated, VSS otherwise, denies pain stated that she has mild pain with voiding.        Casi Dobbs RN 7/9/2025 5:34 AM

## 2025-07-09 NOTE — PROGRESS NOTES
07/09/25 1200   Appointment Info   Signing Clinician's Name / Credentials (OT) Liberty Martin OTR/L   Living Environment   People in Home child(yakov), adult;grandchild(yakov)   Current Living Arrangements mobile home   Home Accessibility stairs to enter home   Number of Stairs, Main Entrance 4   Stair Railings, Main Entrance railings safe and in good condition   Transportation Anticipated family or friend will provide   Self-Care   Usual Activity Tolerance good   Current Activity Tolerance fair   Equipment Currently Used at Home none   Fall history within last six months no   Cognitive Status Examination   Orientation Status orientation to person, place and time   Follows Commands WNL   Cognitive Status Comments pt is A & O, good safety awareness   Lower Body Dressing Assessment/Training   [place] Level (Lower Body Dressing) lower body dressing skills;minimum assist (75% patient effort)   Grooming Assessment/Training   [place] Level (Grooming) grooming skills;supervision   Toileting   [place] Level (Toileting) toileting skills;contact guard assist   Clinical Impression   Criteria for Skilled Therapeutic Interventions Met (OT) Yes, treatment indicated   OT Problem List-Impairments impacting ADL strength;other (see comments)  (mild low back pain, mild post-op weakness (post-op ureter stent))   Assessment of Occupational Performance 1-3 Performance Deficits   Identified Performance Deficits Decline in ADL and functional mobility   Planned Therapy Interventions (OT) ADL retraining;other (see comments)  (assess AE/DME needs for home)   Clinical Decision Making Complexity (OT) problem focused assessment/low complexity   Risk & Benefits of therapy have been explained patient   OT Goals   OT Predicted Duration/Target Date for Goal Attainment 07/11/25   OT Goals Lower Body Dressing;OT Goal 1   OT: Lower Body Dressing Supervision/stand-by assist   OT: Goal 1 Assessment/recommendation of AE/DME needs for maximal  performance and safety with ADL at home   Self-Care/Home Management   Self-Care/Home Mgmt/ADL, Compensatory, Meal Prep Minutes (55228) 20   Treatment Detail/Skilled Intervention Education for safety and increasing independence.   [place] Level (Bathing Training) independent   Assistive Device (Bathing Training) tub seat with back   [place] Level (Upper Body Dressing Training) independent   Lower Body Dressing Training Assistance independent   Therapeutic Activities   Therapeutic Activity Minutes (62157) 10   Symptoms noted during/after treatment none   Treatment Detail/Skilled Intervention Sit to stand independently. Ambulates in the room, bathroom and hallways with quad cane.   OT Discharge Planning   OT Plan OT to continue   OT Discharge Recommendation (DC Rec) home with assist   OT Rationale for DC Rec Patient is at baseline with ADLs, when medically ready patient ready for d/c to home.   OT Brief overview of current status See above   Total Session Time   Timed Code Treatment Minutes 30   Total Session Time (sum of timed and untimed services) 30

## 2025-07-09 NOTE — PROGRESS NOTES
SAFETY CHECKLIST  ID Bands and Risk clasps correct and in place (DNR, Fall risk, Allergy, Latex, Limb):  Yes  All Lines Reconciled and labeled correctly: Yes  Whiteboard updated:Yes  Environmental interventions: Yes  Verify Tele #:        Casi Dobbs RN 7/8/2025 8:39 PM

## 2025-07-09 NOTE — PROGRESS NOTES
07/08/25 1100   Appointment Info   Signing Clinician's Name / Credentials (OT) ZhaneBess Hampton, OTR/L   Living Environment   People in Home child(yakov), adult;grandchild(yakov)   Current Living Arrangements mobile home   Home Accessibility stairs to enter home   Number of Stairs, Main Entrance 4   Stair Railings, Main Entrance railings safe and in good condition   Transportation Anticipated family or friend will provide   Self-Care   Usual Activity Tolerance good   Current Activity Tolerance fair   Equipment Currently Used at Home none   Fall history within last six months no   General Information   General Observations and Info Pt ambulates with with quad cane, fair stability with transfers at EOB and recliner with CGA   Cognitive Status Examination   Orientation Status orientation to person, place and time   Follows Commands WNL   Cognitive Status Comments pt is A & O, good safety awareness   Lower Body Dressing Assessment/Training   [place] Level (Lower Body Dressing) lower body dressing skills;minimum assist (75% patient effort)   Comment, (Lower Body Dressing) Pt has difficulty reaching her feet to don socks/shoes due to mild back pain with bending down   Grooming Assessment/Training   [place] Level (Grooming) grooming skills;supervision   Toileting   [place] Level (Toileting) toileting skills;contact guard assist   Clinical Impression   Criteria for Skilled Therapeutic Interventions Met (OT) Yes, treatment indicated   OT Diagnosis decline in ADL and functional mobility   Influenced by the following impairments mild weakness, mild lower back pain with bending down   OT Problem List-Impairments impacting ADL strength;other (see comments)  (mild low back pain, mild post-op weakness (post-op ureter stent))   Assessment of Occupational Performance 1-3 Performance Deficits   Identified Performance Deficits Decline in ADL and functional mobility   Planned Therapy Interventions (OT) ADL retraining;other  (see comments)  (assess AE/DME needs for home)   Clinical Decision Making Complexity (OT) problem focused assessment/low complexity   Risk & Benefits of therapy have been explained patient   Clinical Impression Comments Pt reports mild lower back pain and burning with urination. Reaching to her feet is difficult due to back strain, required assist for donning/doffing footwear today. Pt was instructed in benefit of using sock aid. She ambulates with quad cane and CGA   OT Total Evaluation Time   OT Eval, Low Complexity Minutes (18794) 15   OT Goals   OT Predicted Duration/Target Date for Goal Attainment 07/11/25   OT Goals Lower Body Dressing;OT Goal 1   OT: Lower Body Dressing Supervision/stand-by assist   OT: Goal 1 Assessment/recommendation of AE/DME needs for maximal performance and safety with ADL at home   Self-Care/Home Management   Self-Care/Home Mgmt/ADL, Compensatory, Meal Prep Minutes (68943) 15   Treatment Detail/Skilled Intervention LB dressing seated EOB with mod assist to don/doff footwear, bed mobility with CGA and functional mob in and out of room with CGA/SBA using quad cane   OT Discharge Planning   OT Plan OT to continue   OT Discharge Recommendation (DC Rec) home with assist   OT Rationale for DC Rec Pt not at baseline for ADL, activity tolerance   OT Brief overview of current status Pt is motivated to progress with ADL, demonstrates bed mobility with CGA and ambulation with quad cane with CGA/SBA

## 2025-07-09 NOTE — PLAN OF CARE
Goal Outcome Evaluation:      Plan of Care Reviewed With: patient    Overall Patient Progress: improvingOverall Patient Progress: improving    Outcome Evaluation: VSS, afebrile and denies pain. walking independentally in room. Some burning with urination.      VSS, patient in agreement to discharge this afternoon to home, denies pain.

## 2025-07-10 ENCOUNTER — PATIENT OUTREACH (OUTPATIENT)
Dept: FAMILY MEDICINE | Facility: OTHER | Age: 70
End: 2025-07-10
Payer: MEDICARE

## 2025-07-10 ENCOUNTER — TELEPHONE (OUTPATIENT)
Dept: FAMILY MEDICINE | Facility: OTHER | Age: 70
End: 2025-07-10
Payer: MEDICARE

## 2025-07-10 LAB
BACTERIA SPEC CULT: NORMAL
BACTERIA SPEC CULT: NORMAL

## 2025-07-10 NOTE — TELEPHONE ENCOUNTER
[facility] phone call attempted at 8:57 AM 7/10/2025.   Left message for patient to return call.    Spoke with urology scheduling and someone will reach out to patient on Monday 7/14/25 to schedule surgery.    Елена Birmingham RN on 7/10/2025 at 8:58 AM

## 2025-07-10 NOTE — TELEPHONE ENCOUNTER
Transitions of Care Outreach  Chief Complaint   Patient presents with    Hospital F/U       Most Recent Admission Date: 7/6/2025   Most Recent Admission Diagnosis: Hydronephrosis with urinary obstruction due to ureteral calculus - N13.2  Left ureteral stone - N20.1  Allergic reaction to drug, initial encounter - T78.40XA  Hypertension, unspecified type - I10     Most Recent Discharge Date: 7/9/2025   Most Recent Discharge Diagnosis: Left ureteral stone - N20.1  Hydronephrosis with urinary obstruction due to ureteral calculus - N13.2  Hypertension, unspecified type - I10  Allergic reaction to drug, initial encounter - T78.40XA     Transitions of Care Assessment    Discharge Assessment  How are you doing now that you are home?: tired,still not sleeping through due to going to bathroom all the time  How are your symptoms? (Red Flag symptoms escalate to triage hotline per guidelines): Improved  Do you know how to contact your clinic care team if you have future questions or changes to your health status? : Yes  Does the patient have their discharge instructions? : Yes  Does the patient have questions regarding their discharge instructions? : No  Were you started on any new medications or were there changes to any of your previous medications? : Yes  Does the patient have all of their medications?: Yes  Do you have questions regarding any of your medications? : No  Do you have all of your needed medical supplies or equipment (DME)?  (i.e. oxygen tank, CPAP, cane, etc.): Yes    Follow up Plan     Discharge Follow-Up  Discharge follow up appointment scheduled in alignment with recommended follow up timeframe or Transitions of Risk Category? (Low = within 30 days; Moderate= within 14 days; High= within 7 days): Yes  Discharge Follow Up Appointment Date: 07/18/25  Discharge Follow Up Appointment Scheduled with?: Primary Care Provider (Dr. Mcwilliams's office will be calling patient on Monday 7/14 to schedule appt)    Future  Appointments   Date Time Provider Department Center   7/18/2025 10:00 AM Jennifer Bar NP [facility]       Outpatient Plan as outlined on AVS reviewed with patient.    For any urgent concerns, please contact our 24 hour nurse triage line: [phone])       Елена Birmingham RN

## 2025-07-12 LAB
BACTERIA SPEC CULT: NO GROWTH
BACTERIA SPEC CULT: NO GROWTH

## 2025-07-15 DIAGNOSIS — N20.1 LEFT URETERAL CALCULUS: Primary | ICD-10-CM

## 2025-07-16 ENCOUNTER — OFFICE VISIT (OUTPATIENT)
Dept: FAMILY MEDICINE | Facility: OTHER | Age: 70
End: 2025-07-16
Attending: NURSE PRACTITIONER
Payer: MEDICARE

## 2025-07-16 VITALS
WEIGHT: 224 LBS | RESPIRATION RATE: 16 BRPM | DIASTOLIC BLOOD PRESSURE: 92 MMHG | HEART RATE: 80 BPM | HEIGHT: 65 IN | SYSTOLIC BLOOD PRESSURE: 148 MMHG | TEMPERATURE: 97.8 F | BODY MASS INDEX: 37.32 KG/M2 | OXYGEN SATURATION: 95 %

## 2025-07-16 DIAGNOSIS — N17.9 AKI (ACUTE KIDNEY INJURY): ICD-10-CM

## 2025-07-16 DIAGNOSIS — Z01.818 PREOP GENERAL PHYSICAL EXAM: Primary | ICD-10-CM

## 2025-07-16 DIAGNOSIS — N20.1 LEFT URETERAL STONE: ICD-10-CM

## 2025-07-16 DIAGNOSIS — E66.812 CLASS 2 SEVERE OBESITY WITH BODY MASS INDEX (BMI) OF 35 TO 39.9 WITH SERIOUS COMORBIDITY (H): ICD-10-CM

## 2025-07-16 DIAGNOSIS — Z01.818 PRE-OP EXAM: ICD-10-CM

## 2025-07-16 DIAGNOSIS — N13.2 HYDRONEPHROSIS WITH URINARY OBSTRUCTION DUE TO URETERAL CALCULUS: ICD-10-CM

## 2025-07-16 DIAGNOSIS — I10 ESSENTIAL HYPERTENSION: ICD-10-CM

## 2025-07-16 DIAGNOSIS — Z87.442 HISTORY OF KIDNEY STONES: ICD-10-CM

## 2025-07-16 DIAGNOSIS — E66.01 CLASS 2 SEVERE OBESITY WITH BODY MASS INDEX (BMI) OF 35 TO 39.9 WITH SERIOUS COMORBIDITY (H): ICD-10-CM

## 2025-07-16 LAB
ALBUMIN UR-MCNC: 50 MG/DL
ANION GAP SERPL CALCULATED.3IONS-SCNC: 11 MMOL/L (ref 7–15)
APPEARANCE UR: ABNORMAL
BACTERIA #/AREA URNS HPF: ABNORMAL /HPF
BILIRUB UR QL STRIP: NEGATIVE
BUN SERPL-MCNC: 20.5 MG/DL (ref 8–23)
CALCIUM SERPL-MCNC: 8.9 MG/DL (ref 8.8–10.4)
CHLORIDE SERPL-SCNC: 107 MMOL/L (ref 98–107)
COLOR UR AUTO: ABNORMAL
CREAT SERPL-MCNC: 0.81 MG/DL (ref 0.51–0.95)
EGFRCR SERPLBLD CKD-EPI 2021: 79 ML/MIN/1.73M2
GLUCOSE SERPL-MCNC: 115 MG/DL (ref 70–99)
GLUCOSE UR STRIP-MCNC: NEGATIVE MG/DL
HCO3 SERPL-SCNC: 25 MMOL/L (ref 22–29)
HGB UR QL STRIP: ABNORMAL
KETONES UR STRIP-MCNC: NEGATIVE MG/DL
LEUKOCYTE ESTERASE UR QL STRIP: ABNORMAL
MUCOUS THREADS #/AREA URNS LPF: PRESENT /LPF
NITRATE UR QL: NEGATIVE
PH UR STRIP: 5.5 [PH] (ref 5–9)
POTASSIUM SERPL-SCNC: 4.2 MMOL/L (ref 3.4–5.3)
RBC URINE: >182 /HPF
SODIUM SERPL-SCNC: 143 MMOL/L (ref 135–145)
SP GR UR STRIP: 1.02 (ref 1–1.03)
UROBILINOGEN UR STRIP-MCNC: NORMAL MG/DL
WBC URINE: 67 /HPF

## 2025-07-16 PROCEDURE — 81001 URINALYSIS AUTO W/SCOPE: CPT | Mod: ZL | Performed by: NURSE PRACTITIONER

## 2025-07-16 PROCEDURE — G0463 HOSPITAL OUTPT CLINIC VISIT: HCPCS

## 2025-07-16 PROCEDURE — 36415 COLL VENOUS BLD VENIPUNCTURE: CPT | Mod: ZL | Performed by: NURSE PRACTITIONER

## 2025-07-16 PROCEDURE — 80048 BASIC METABOLIC PNL TOTAL CA: CPT | Mod: ZL | Performed by: NURSE PRACTITIONER

## 2025-07-16 ASSESSMENT — PAIN SCALES - GENERAL: PAINLEVEL_OUTOF10: NO PAIN (0)

## 2025-07-16 NOTE — PATIENT INSTRUCTIONS
How to Take Your Medication Before Surgery  Preoperative Medication Instructions   Antiplatelet or Anticoagulation Medication Instructions   - We reviewed the medication list and the patient is not on an antiplatelet or anticoagulation medications.    Additional Medication Instructions   - Herbal medications and vitamins: DO NOT TAKE 14 days prior to surgery.   - naproxen (Aleve, Naprosyn): DO NOT TAKE 4 days before surgery.   Do not take any NSAID medications for 1 week prior to surgery but you may take tylenol/acetaminophen as needed up until then.        Patient Education   Preparing for Your Surgery  For Adults  Getting started  In most cases, a nurse will call to review your health history and instructions. They will give you an arrival time based on your scheduled surgery time. Please be ready to share:  Your doctor's clinic name and phone number  Your medical, surgical, and anesthesia history  A list of allergies and sensitivities  A list of medicines, including herbal treatments and over-the-counter drugs  Whether the patient has a legal guardian (ask how to send us the papers in advance)  Note: You may not receive a call if you were seen at our PAC (Preoperative Assessment Center).  Please tell us if you're pregnant--or if there's any chance you might be pregnant. Some surgeries may injure a fetus (unborn baby), so they require a pregnancy test. Surgeries that are safe for a fetus don't always need a test, and you can choose whether to have one.   Preparing for surgery  Within 10 to 30 days of surgery: Have a pre-op exam (sometimes called an H&P, or History and Physical). This can be done at a clinic or pre-operative center.  If you're having a , you may not need this exam. Talk to your care team.  At your pre-op exam, talk to your care team about all medicines you take. (This includes CBD oil and any drugs, such as THC, marijuana, and other forms of cannabis.) If you need to stop any medicine  before surgery, ask when to start taking it again.  This is for your safety. Many medicines and drugs can make you bleed too much during surgery. Some change how well surgery (anesthesia) drugs work.  Call your insurance company to let them know you're having surgery. (If you don't have insurance, call [phone].)  Call your clinic if there's any change in your health. This includes a scrape or scratch near the surgery site, or any signs of a cold (sore throat, runny nose, cough, rash, fever).  Eating and drinking guidelines  For your safety: Unless your surgeon tells you otherwise, follow the guidelines below.  Eat and drink as normal until 8 hours before you arrive for surgery. After that, no food or milk. You can spit out gum when you arrive.  Drink clear liquids until 2 hours before you arrive. These are liquids you can see through, like water, Gatorade, and Propel Water. They also include plain black coffee and tea (no cream or milk).  No alcohol for 24 hours before you arrive. The night before surgery, stop any drinks that contain THC.  If your care team tells you to take medicine on the morning of surgery, it's okay to take it with a sip of water. No other medicines or drugs are allowed (including CBD oil)--follow your care team's instructions.  If you have questions the day of surgery, call your hospital or surgery center.   Preventing infection  Shower or bathe the night before and the morning of surgery. Follow the instructions your clinic gave you. (If no instructions, use regular soap.)  Don't shave or clip hair near your surgery site. We'll remove the hair if needed.  Don't smoke or vape the morning of surgery. No chewing tobacco for 6 hours before you arrive. A nicotine patch is okay. You may spit out nicotine gum when you arrive.  For some surgeries, the surgeon will tell you to fully quit smoking and nicotine.  We will make every effort to keep you safe from infection. We will:  Clean our hands  often with soap and water (or an alcohol-based hand rub).  Clean the skin at your surgery site with a special soap that kills germs.  Give you a special gown to keep you warm. (Cold raises the risk of infection.)  Wear hair covers, masks, gowns, and gloves during surgery.  Give antibiotic medicine, if prescribed. Not all surgeries need this medicine.  What to bring on the day of surgery  Photo ID and insurance card  Copy of your health care directive, if you have one  Glasses and hearing aids (bring cases)  You can't wear contacts during surgery  Inhaler and eye drops, if you use them (tell us about these when you arrive)  CPAP machine or breathing device, if you use them  A few personal items, if spending the night  If you have . . .  A pacemaker, ICD (cardiac defibrillator), or other implant: Bring the ID card.  An implanted stimulator: Bring the remote control.  A legal guardian: Bring a copy of the certified (court-stamped) guardianship papers.  Please remove any jewelry, including body piercings. Leave jewelry and other valuables at home.  If you're going home the day of surgery  You must have a support person drive you home. They should stay with you overnight, and they may need to help with your self-care.  If you don't have a support person, please tells us as soon as possible. We can help.  After surgery  If it's hard to control your pain or you need more pain medicine, please call your surgeon's office.  Questions?   If you have any questions for your care team, list them here:   ____________________________________________________________________________________________________________________________________________________________________________________________________________________________________________________________  For informational purposes only. Not to replace the advice of your health care provider. Copyright   2003, 2019 [facility]. All rights reserved. Clinically reviewed  by Cecilio Carson MD. [employer] [id number] - REV 02/25.

## 2025-07-16 NOTE — PROGRESS NOTES
Preoperative Evaluation  [facility]  [address]  [place] [address]  Phone: [phone]  Fax: [phone]  Primary Provider: Physician No Ref-Primary  Pre-op Performing Provider: Jennifer Bar NP  Jul 16, 2025 7/16/2025   Surgical Information   What procedure is being done? Left ureteroscopy   Facility or Hospital where procedure/surgery will be performed: [facility]   Who is doing the procedure / surgery? Dr. Mcwilliams   Date of surgery / procedure: 07-   Time of surgery / procedure: tbd   Where do you plan to recover after surgery? at home with family     Fax number for surgical facility: Note does not need to be faxed, will be available electronically in Epic.    Assessment & Plan     The proposed surgical procedure is considered INTERMEDIATE risk.    Problem List Items Addressed This Visit       History of kidney stones    Essential hypertension    FRANCIS (acute kidney injury)    Relevant Orders    Basic Metabolic Panel (Completed)    UA Macroscopic with reflex to Microscopic and Culture (Completed)    Urine Culture    Class 2 severe obesity with body mass index (BMI) of 35 to 39.9 with serious comorbidity (H)    Hydronephrosis with urinary obstruction due to ureteral calculus    Left ureteral stone    Relevant Orders    Basic Metabolic Panel (Completed)    UA Macroscopic with reflex to Microscopic and Culture (Completed)    Urine Culture     Other Visit Diagnoses         Preop general physical exam    -  Primary      Pre-op exam        Relevant Orders    Basic Metabolic Panel (Completed)    UA Macroscopic with reflex to Microscopic and Culture (Completed)    Urine Culture          1. Preop general physical exam (Primary)  2. Pre-op exam  3. Left ureteral stone  4. FRANCIS (acute kidney injury)  5. Hydronephrosis with urinary obstruction due to ureteral calculus  6. History of kidney stones  - UA Macroscopic with reflex to Microscopic and Culture  - Basic Metabolic  Panel  - Urine Culture  Will follow urine culture; for now continue with ciprofloxacin until gone. Urine culture from recent hospitalization without growth. Renal function has improvd with GFR of 79 today and creatinine of 0.81 which is reassuring. No signs of sepsis or systemic infection at this time.     7. Essential hypertension  Remains hypertensive today with blood pressure of 148/92. She has historically had hypertension and not followed through with treatment. Will return in 2 days for hospital follow up visit and if blood pressure remains elevated we may need to consider initiation of medication prior to upcoming surgery. For now, will continue to monitor. She will cut sodium from her diet and push oral hydration in the meantime. Pain could be a factor in hypertension although we know this was noted dating back to 2018.     8. Class 2 severe obesity with body mass index (BMI) of 35 to 39.9 with serious comorbidity (H)  Would benefit from weight loss which would also help with hypertension management. I have encouraged patient to schedule a physical in the future to also discuss preventative care and screenings.       MED REC REQUIRED  Post Medication Reconciliation Status: discharge medications reconciled, continue medications without change  Possible Sleep Apnea: snores; has not had a sleep study and does not wear a CPAP        Risks and Recommendations  The patient has the following additional risks and recommendations for perioperative complications:  Infection:    - She is currently on ciprofloxacin; but a urine culture is now in process as the urinalysis today shows bacteria and white blood cells. Will follow and treat accordingly if changes are indicated.     Antiplatelet or Anticoagulation Medication Instructions   - We reviewed the medication list and the patient is not on an antiplatelet or anticoagulation medications.    Additional Medication Instructions  We reviewed the medication list and there  "are no chronic medications that need to be adjusted for this procedure.   - Herbal medications and vitamins: DO NOT TAKE 14 days prior to surgery.    Recommendation  Approval given to proceed with proposed procedure, without further diagnostic evaluation.    She will return in 2 days or her hospital follow up visit. If her blood pressure remains elevated, we may need to consider initiation of antihypertensive medication. She has previously been diagnosed with hypertension in 2018 but did not follow through with treatment. She does not come in for annual physicals or any preventative care.       Chemo Thompson is a 70 year old, presenting for the following:  Pre-Op Exam          7/16/2025     1:13 PM   Additional Questions   Roomed by ILIR Rodriguez     HPI:   Donna presents today for a pre-operative evaluation prior to upcoming left ureteroscopy with Dr. Mcwilliams. She was hospitalized recently with left ureteral stone and hydronephrosis with urinary obstruction due to stone. She is taking ciprofloxacin and urine culture did not grow out any bacteria. She feels like \"something is going on in the bladder\" however and reports occasional dysuria. She has no significant frequency or urgency. She is urinating alright, better than she was. No fevers or chills. She has rights sided flank pain which is sharp at times. The left flank region is actually not painful and that is the side that has the known stone. She is on ciprofloxacin due to the stent being in place.     No past anesthesia reactions or complications. She has no significant past surgical history however. No past cardiac problems. Non smoker. Does not drink alcohol or use any substances.     She is not on any prescription medication besides the ciprofloxacin. She is not on any antihypertensives although her blood pressure at times is elevated. She is in minimal pain now.     No recent fevers.     No chest pain or palpitations with activity. She is quite sedentary. "               7/16/2025   Pre-Op Questionnaire   Have you ever had a heart attack or stroke? No   Have you ever had surgery on your heart or blood vessels, such as a stent placement, a coronary artery bypass, or surgery on an artery in your head, neck, heart, or legs? No   Do you have chest pain with activity? No   Do you have a history of heart failure? No   Do you currently have a cold, bronchitis or symptoms of other infection? No   Do you have a cough, shortness of breath, or wheezing? No   Do you or anyone in your family have previous history of blood clots? No personal history    Do you or does anyone in your family have a serious bleeding problem such as prolonged bleeding following surgeries or cuts? No   Have you ever had problems with anemia or been told to take iron pills? No   Have you had any abnormal blood loss such as black, tarry or bloody stools, or abnormal vaginal bleeding? No   Have you ever had a blood transfusion? No   Are you willing to have a blood transfusion if it is medically needed before, during, or after your surgery? Yes   Have you or any of your relatives ever had problems with anesthesia? No   Do you have sleep apnea, excessive snoring or daytime drowsiness? No diagnosis of sleep apnea but she snores often but has not had a sleep study    Do you have a CPAP machine? No   Do you have any artifical heart valves or other implanted medical devices like a pacemaker, defibrillator, or continuous glucose monitor? No   Do you have artificial joints? No   Are you allergic to latex? No     Advance Care Planning    Discussed advance care planning with patient; however, patient declined at this time.    Preoperative Review of    reviewed - no record of controlled substances prescribed.          Patient Active Problem List    Diagnosis Date Noted    Hydronephrosis with urinary obstruction due to ureteral calculus 07/07/2025     Priority: Medium    Left ureteral stone 07/07/2025      "Priority: Medium    Allergic reaction to drug, initial encounter 2025     Priority: Medium    Hypertension, unspecified type 2025     Priority: Medium    Severe sepsis (H) 2025     Priority: Medium    Class 2 severe obesity with body mass index (BMI) of 35 to 39.9 with serious comorbidity (H) 2025     Priority: Medium    CAP (community acquired pneumonia) 2025     Priority: Medium    FRANCIS (acute kidney injury) 2025     Priority: Medium    Biceps tendonitis, left 2018     Priority: Medium    History of kidney stones 2018     Priority: Medium    Essential hypertension 2018     Priority: Medium      No past medical history on file.  Past Surgical History:   Procedure Laterality Date    COMBINED CYSTOSCOPY, INSERT STENT URETER(S) Left 2025    Procedure: CYSTOSCOPY, WITH LEFT RETROGRADE AND URETERAL STENT PLACEMENT;  Surgeon: Kal Mcwilliams MD;  Location: [facility] OR    OTHER SURGICAL HISTORY      ,,APPENDECTOMY OPEN,intussesception and appy     Current Outpatient Medications   Medication Sig Dispense Refill    ciprofloxacin (CIPRO) 500 MG tablet Take 1 tablet (500 mg) by mouth 2 times daily. 28 tablet 0    acetaminophen (TYLENOL) 500 MG tablet Take 1,000 mg by mouth every 6 hours as needed for mild pain. (Patient not taking: Reported on 2025)         No Known Allergies     Social History     Tobacco Use    Smoking status: Former     Current packs/day: 0.00     Types: Cigarettes     Quit date: 1/15/1991     Years since quittin.5    Smokeless tobacco: Never   Substance Use Topics    Alcohol use: Yes     Alcohol/week: 0.0 standard drinks of alcohol     Comment: 2 glasses a week      History   Drug Use No           Review of Systems  Constitutional, HEENT, cardiovascular, pulmonary, gi and gu systems are negative, except as otherwise noted.    Objective    BP (!) 148/92   Pulse 80   Temp 97.8  F (36.6  C) (Temporal)   Resp 16   Ht 1.651 m (5' 5\")   " "Wt 101.6 kg (224 lb)   SpO2 95%   Breastfeeding No   BMI 37.28 kg/m     Estimated body mass index is 37.28 kg/m  as calculated from the following:    Height as of this encounter: 1.651 m (5' 5\").    Weight as of this encounter: 101.6 kg (224 lb).  Physical Exam  Vitals and nursing note reviewed.   Constitutional:       General: She is not in acute distress.     Appearance: Normal appearance. She is normal weight. She is not ill-appearing or toxic-appearing.   Cardiovascular:      Rate and Rhythm: Normal rate and regular rhythm.      Heart sounds: Normal heart sounds. No murmur heard.  Pulmonary:      Effort: Pulmonary effort is normal. No respiratory distress.      Breath sounds: Normal breath sounds. No wheezing.   Abdominal:      General: Abdomen is flat. Bowel sounds are normal. There is no distension.      Palpations: Abdomen is soft.      Tenderness: There is no abdominal tenderness. There is no right CVA tenderness or left CVA tenderness.   Musculoskeletal:         General: Normal range of motion.   Skin:     General: Skin is warm and dry.   Neurological:      General: No focal deficit present.      Mental Status: She is alert and oriented to person, place, and time.   Psychiatric:         Mood and Affect: Mood normal.         Behavior: Behavior normal.           Recent Labs   Lab Test 07/09/25  0547 07/07/25  0637   HGB 11.7 12.5   * 139*    139   POTASSIUM 3.5 3.6   CR 0.97* 1.08*        Diagnostics       Results for orders placed or performed in visit on 07/16/25   UA Macroscopic with reflex to Microscopic and Culture     Status: Abnormal    Specimen: Urine, Clean Catch   Result Value Ref Range    Color Urine Light Yellow Colorless, Straw, Light Yellow, Yellow    Appearance Urine Slightly Cloudy (A) Clear    Glucose Urine Negative Negative mg/dL    Bilirubin Urine Negative Negative    Ketones Urine Negative Negative mg/dL    Specific Gravity Urine 1.023 1.000 - 1.030    Blood Urine Large (A) " Negative    pH Urine 5.5 5.0 - 9.0    Protein Albumin Urine 50 (A) Negative mg/dL    Urobilinogen Urine Normal Normal mg/dL    Nitrite Urine Negative Negative    Leukocyte Esterase Urine Moderate (A) Negative    Bacteria Urine Few (A) None Seen /HPF    Mucus Urine Present (A) None Seen /LPF    RBC Urine >182 (H) <=2 /HPF    WBC Urine 67 (H) <=5 /HPF    Narrative    Urine Culture ordered based on laboratory criteria   Basic Metabolic Panel     Status: Abnormal   Result Value Ref Range    Sodium 143 135 - 145 mmol/L    Potassium 4.2 3.4 - 5.3 mmol/L    Chloride 107 98 - 107 mmol/L    Carbon Dioxide (CO2) 25 22 - 29 mmol/L    Anion Gap 11 7 - 15 mmol/L    Urea Nitrogen 20.5 8.0 - 23.0 mg/dL    Creatinine 0.81 0.51 - 0.95 mg/dL    GFR Estimate 79 >60 mL/min/1.73m2    Calcium 8.9 8.8 - 10.4 mg/dL    Glucose 115 (H) 70 - 99 mg/dL       -EKG during recent hospitalization on 7/6/2025 showed sinus tachycardia but at the time that was explained due to acute illness and pain. The rhythm itself was otherwise normal. A repeat EKG was not done today. The patient has a normal rate and rhythm to auscultation.     Revised Cardiac Risk Index (RCRI)  The patient has the following serious cardiovascular risks for perioperative complications:   - No serious cardiac risks = 0 points     RCRI Interpretation: 0 points: Class I (very low risk - 0.4% complication rate)         Signed Electronically by: Jennifer Bar NP  A copy of this evaluation report is provided to the requesting physician.

## 2025-07-21 DIAGNOSIS — A49.02 MRSA INFECTION: Primary | ICD-10-CM

## 2025-07-21 DIAGNOSIS — N30.01 ACUTE CYSTITIS WITH HEMATURIA: ICD-10-CM

## 2025-07-21 RX ORDER — SULFAMETHOXAZOLE AND TRIMETHOPRIM 800; 160 MG/1; MG/1
1 TABLET ORAL 2 TIMES DAILY
Qty: 6 TABLET | Refills: 0 | Status: SHIPPED | OUTPATIENT
Start: 2025-07-21 | End: 2025-07-24

## 2025-07-22 ENCOUNTER — TELEPHONE (OUTPATIENT)
Dept: FAMILY MEDICINE | Facility: OTHER | Age: 70
End: 2025-07-22
Payer: MEDICARE

## 2025-07-22 NOTE — TELEPHONE ENCOUNTER
I have tried calling Donna twice but she has not answered. Also sent her a my chart message that does not look that it was seen - I just want to make sure she knows she should stay on cipro (I extended it to 10 day course) up until surgery.      Please try to get a hold of her again. Thanks!     Jennifer Bar NP on 7/22/2025 at 1:49 PM

## 2025-07-24 NOTE — TELEPHONE ENCOUNTER
She should take bactrim up until surgery (NOT CIPRO as it was resistant to recent culture) That was my mistake in typing cipro.     Please apologize to patient for miscommunication. I did get her my chart message yesterday and I think this message to call her was sent prior to that.     Jennifer Bar NP on 7/24/2025 at 11:12 AM

## 2025-07-24 NOTE — TELEPHONE ENCOUNTER
Patient informed of below. She states she read her OUYA message and responded back. Patient seemed a little irritated that she was getting a call after she read her OUYA message. Also, per OUYA message, it states patient is to take Bactrim up until the day of her surgery. However, in the message that was sent to me below you state patient is to take Cipro up until surgery. Please advise.     Jennifer Paula CMA on 7/24/2025 at 9:29 AM

## 2025-07-25 ENCOUNTER — ANESTHESIA EVENT (OUTPATIENT)
Dept: SURGERY | Facility: OTHER | Age: 70
End: 2025-07-25
Payer: MEDICARE

## 2025-07-28 ENCOUNTER — HOSPITAL ENCOUNTER (OUTPATIENT)
Facility: OTHER | Age: 70
Discharge: HOME | End: 2025-07-28
Attending: UROLOGY | Admitting: UROLOGY
Payer: MEDICARE

## 2025-07-28 ENCOUNTER — ANESTHESIA (OUTPATIENT)
Dept: SURGERY | Facility: OTHER | Age: 70
End: 2025-07-28
Payer: MEDICARE

## 2025-07-28 ENCOUNTER — HOSPITAL ENCOUNTER (OUTPATIENT)
Dept: GENERAL RADIOLOGY | Facility: OTHER | Age: 70
Discharge: HOME | End: 2025-07-28
Attending: UROLOGY | Admitting: UROLOGY
Payer: MEDICARE

## 2025-07-28 VITALS
RESPIRATION RATE: 16 BRPM | OXYGEN SATURATION: 94 % | HEIGHT: 65 IN | DIASTOLIC BLOOD PRESSURE: 107 MMHG | HEART RATE: 83 BPM | BODY MASS INDEX: 36.82 KG/M2 | SYSTOLIC BLOOD PRESSURE: 178 MMHG | TEMPERATURE: 97 F | WEIGHT: 221 LBS

## 2025-07-28 DIAGNOSIS — N20.1 LEFT URETERAL CALCULUS: ICD-10-CM

## 2025-07-28 DIAGNOSIS — N20.1 OBSTRUCTION OF LEFT URETEROPELVIC JUNCTION (UPJ) DUE TO STONE: Primary | ICD-10-CM

## 2025-07-28 PROCEDURE — 250N000013 HC RX MED GY IP 250 OP 250 PS 637: Performed by: UROLOGY

## 2025-07-28 PROCEDURE — 370N000017 HC ANESTHESIA TECHNICAL FEE, PER MIN: Performed by: UROLOGY

## 2025-07-28 PROCEDURE — 82365 CALCULUS SPECTROSCOPY: CPT | Performed by: UROLOGY

## 2025-07-28 PROCEDURE — 250N000009 HC RX 250: Performed by: NURSE ANESTHETIST, CERTIFIED REGISTERED

## 2025-07-28 PROCEDURE — 250N000011 HC RX IP 250 OP 636: Performed by: NURSE ANESTHETIST, CERTIFIED REGISTERED

## 2025-07-28 PROCEDURE — 52352 CYSTOURETERO W/STONE REMOVE: CPT | Performed by: NURSE ANESTHETIST, CERTIFIED REGISTERED

## 2025-07-28 PROCEDURE — C1758 CATHETER, URETERAL: HCPCS | Performed by: UROLOGY

## 2025-07-28 PROCEDURE — 52332 CYSTOSCOPY AND TREATMENT: CPT | Mod: LT | Performed by: UROLOGY

## 2025-07-28 PROCEDURE — 52352 CYSTOURETERO W/STONE REMOVE: CPT | Mod: LT | Performed by: UROLOGY

## 2025-07-28 PROCEDURE — C1769 GUIDE WIRE: HCPCS | Performed by: UROLOGY

## 2025-07-28 PROCEDURE — 999N000179 XR SURGERY CARM FLUORO LESS THAN 5 MIN W STILLS

## 2025-07-28 PROCEDURE — 360N000084 HC SURGERY LEVEL 4 W/ FLUORO, PER MIN: Performed by: UROLOGY

## 2025-07-28 PROCEDURE — 250N000011 HC RX IP 250 OP 636: Performed by: UROLOGY

## 2025-07-28 PROCEDURE — 272N000002 HC OR SUPPLY OTHER OPNP: Performed by: UROLOGY

## 2025-07-28 PROCEDURE — 999N000141 HC STATISTIC PRE-PROCEDURE NURSING ASSESSMENT: Performed by: UROLOGY

## 2025-07-28 PROCEDURE — 710N000010 HC RECOVERY PHASE 1, LEVEL 2, PER MIN: Performed by: UROLOGY

## 2025-07-28 PROCEDURE — 272N000001 HC OR GENERAL SUPPLY STERILE: Performed by: UROLOGY

## 2025-07-28 PROCEDURE — 710N000012 HC RECOVERY PHASE 2, PER MINUTE: Performed by: UROLOGY

## 2025-07-28 PROCEDURE — 250N000025 HC SEVOFLURANE, PER MIN: Performed by: UROLOGY

## 2025-07-28 PROCEDURE — 258N000003 HC RX IP 258 OP 636: Performed by: NURSE ANESTHETIST, CERTIFIED REGISTERED

## 2025-07-28 PROCEDURE — C2617 STENT, NON-COR, TEM W/O DEL: HCPCS | Performed by: UROLOGY

## 2025-07-28 PROCEDURE — 258N000001 HC RX 258: Performed by: UROLOGY

## 2025-07-28 PROCEDURE — C1894 INTRO/SHEATH, NON-LASER: HCPCS | Performed by: UROLOGY

## 2025-07-28 DEVICE — URETERAL STENT
Type: IMPLANTABLE DEVICE | Status: FUNCTIONAL
Brand: CONTOUR™

## 2025-07-28 RX ORDER — TOLTERODINE TARTRATE 2 MG/1
2 TABLET, EXTENDED RELEASE ORAL EVERY 12 HOURS PRN
Qty: 30 TABLET | Refills: 0 | Status: SHIPPED | OUTPATIENT
Start: 2025-07-28

## 2025-07-28 RX ORDER — FENTANYL CITRATE 50 UG/ML
INJECTION, SOLUTION INTRAMUSCULAR; INTRAVENOUS PRN
Status: DISCONTINUED | OUTPATIENT
Start: 2025-07-28 | End: 2025-07-28

## 2025-07-28 RX ORDER — FENTANYL CITRATE 50 UG/ML
25 INJECTION, SOLUTION INTRAMUSCULAR; INTRAVENOUS EVERY 5 MIN PRN
Status: DISCONTINUED | OUTPATIENT
Start: 2025-07-28 | End: 2025-07-28 | Stop reason: HOSPADM

## 2025-07-28 RX ORDER — FENTANYL CITRATE 50 UG/ML
25 INJECTION, SOLUTION INTRAMUSCULAR; INTRAVENOUS
Status: DISCONTINUED | OUTPATIENT
Start: 2025-07-28 | End: 2025-07-28 | Stop reason: HOSPADM

## 2025-07-28 RX ORDER — DEXAMETHASONE SODIUM PHOSPHATE 10 MG/ML
4 INJECTION, SOLUTION INTRAMUSCULAR; INTRAVENOUS
Status: DISCONTINUED | OUTPATIENT
Start: 2025-07-28 | End: 2025-07-28 | Stop reason: HOSPADM

## 2025-07-28 RX ORDER — LIDOCAINE 40 MG/G
CREAM TOPICAL
Status: DISCONTINUED | OUTPATIENT
Start: 2025-07-28 | End: 2025-07-28 | Stop reason: HOSPADM

## 2025-07-28 RX ORDER — ONDANSETRON 4 MG/1
4 TABLET, ORALLY DISINTEGRATING ORAL EVERY 30 MIN PRN
Status: DISCONTINUED | OUTPATIENT
Start: 2025-07-28 | End: 2025-07-28 | Stop reason: HOSPADM

## 2025-07-28 RX ORDER — IOPAMIDOL 755 MG/ML
INJECTION, SOLUTION INTRAVASCULAR PRN
Status: DISCONTINUED | OUTPATIENT
Start: 2025-07-28 | End: 2025-07-28 | Stop reason: HOSPADM

## 2025-07-28 RX ORDER — ACETAMINOPHEN 325 MG/1
975 TABLET ORAL ONCE
Status: COMPLETED | OUTPATIENT
Start: 2025-07-28 | End: 2025-07-28

## 2025-07-28 RX ORDER — HYDROMORPHONE HCL IN WATER/PF 6 MG/30 ML
0.4 PATIENT CONTROLLED ANALGESIA SYRINGE INTRAVENOUS EVERY 5 MIN PRN
Status: DISCONTINUED | OUTPATIENT
Start: 2025-07-28 | End: 2025-07-28 | Stop reason: HOSPADM

## 2025-07-28 RX ORDER — OXYCODONE HYDROCHLORIDE 5 MG/1
10 TABLET ORAL
Status: DISCONTINUED | OUTPATIENT
Start: 2025-07-28 | End: 2025-07-28 | Stop reason: HOSPADM

## 2025-07-28 RX ORDER — AMOXICILLIN 250 MG
1-2 CAPSULE ORAL 2 TIMES DAILY
Qty: 30 TABLET | Refills: 0 | Status: SHIPPED | OUTPATIENT
Start: 2025-07-28

## 2025-07-28 RX ORDER — ACETAMINOPHEN 650 MG/1
650 SUPPOSITORY RECTAL ONCE
Status: COMPLETED | OUTPATIENT
Start: 2025-07-28 | End: 2025-07-28

## 2025-07-28 RX ORDER — NALOXONE HYDROCHLORIDE 0.4 MG/ML
0.1 INJECTION, SOLUTION INTRAMUSCULAR; INTRAVENOUS; SUBCUTANEOUS
Status: DISCONTINUED | OUTPATIENT
Start: 2025-07-28 | End: 2025-07-28 | Stop reason: HOSPADM

## 2025-07-28 RX ORDER — SODIUM CHLORIDE, SODIUM LACTATE, POTASSIUM CHLORIDE, CALCIUM CHLORIDE 600; 310; 30; 20 MG/100ML; MG/100ML; MG/100ML; MG/100ML
INJECTION, SOLUTION INTRAVENOUS CONTINUOUS
Status: DISCONTINUED | OUTPATIENT
Start: 2025-07-28 | End: 2025-07-28 | Stop reason: HOSPADM

## 2025-07-28 RX ORDER — LIDOCAINE HYDROCHLORIDE 20 MG/ML
INJECTION, SOLUTION INFILTRATION; PERINEURAL PRN
Status: DISCONTINUED | OUTPATIENT
Start: 2025-07-28 | End: 2025-07-28

## 2025-07-28 RX ORDER — ONDANSETRON 2 MG/ML
4 INJECTION INTRAMUSCULAR; INTRAVENOUS EVERY 30 MIN PRN
Status: DISCONTINUED | OUTPATIENT
Start: 2025-07-28 | End: 2025-07-28 | Stop reason: HOSPADM

## 2025-07-28 RX ORDER — HYDROMORPHONE HCL IN WATER/PF 6 MG/30 ML
0.2 PATIENT CONTROLLED ANALGESIA SYRINGE INTRAVENOUS EVERY 5 MIN PRN
Status: DISCONTINUED | OUTPATIENT
Start: 2025-07-28 | End: 2025-07-28 | Stop reason: HOSPADM

## 2025-07-28 RX ORDER — OXYCODONE HYDROCHLORIDE 5 MG/1
5 TABLET ORAL EVERY 6 HOURS PRN
Qty: 6 TABLET | Refills: 0 | Status: SHIPPED | OUTPATIENT
Start: 2025-07-28

## 2025-07-28 RX ORDER — PROPOFOL 10 MG/ML
INJECTION, EMULSION INTRAVENOUS PRN
Status: DISCONTINUED | OUTPATIENT
Start: 2025-07-28 | End: 2025-07-28

## 2025-07-28 RX ORDER — FENTANYL CITRATE 50 UG/ML
50 INJECTION, SOLUTION INTRAMUSCULAR; INTRAVENOUS EVERY 5 MIN PRN
Status: DISCONTINUED | OUTPATIENT
Start: 2025-07-28 | End: 2025-07-28 | Stop reason: HOSPADM

## 2025-07-28 RX ORDER — CEFAZOLIN SODIUM/WATER 2 G/20 ML
2 SYRINGE (ML) INTRAVENOUS
Status: COMPLETED | OUTPATIENT
Start: 2025-07-28 | End: 2025-07-28

## 2025-07-28 RX ORDER — OXYCODONE HYDROCHLORIDE 5 MG/1
5 TABLET ORAL
Status: DISCONTINUED | OUTPATIENT
Start: 2025-07-28 | End: 2025-07-28 | Stop reason: HOSPADM

## 2025-07-28 RX ORDER — TOLTERODINE TARTRATE 2 MG/1
2 TABLET, EXTENDED RELEASE ORAL ONCE
Status: COMPLETED | OUTPATIENT
Start: 2025-07-28 | End: 2025-07-28

## 2025-07-28 RX ORDER — CEFAZOLIN SODIUM/WATER 2 G/20 ML
2 SYRINGE (ML) INTRAVENOUS SEE ADMIN INSTRUCTIONS
Status: DISCONTINUED | OUTPATIENT
Start: 2025-07-28 | End: 2025-07-28 | Stop reason: HOSPADM

## 2025-07-28 RX ORDER — DEXAMETHASONE SODIUM PHOSPHATE 4 MG/ML
INJECTION, SOLUTION INTRA-ARTICULAR; INTRALESIONAL; INTRAMUSCULAR; INTRAVENOUS; SOFT TISSUE PRN
Status: DISCONTINUED | OUTPATIENT
Start: 2025-07-28 | End: 2025-07-28

## 2025-07-28 RX ORDER — ONDANSETRON 2 MG/ML
INJECTION INTRAMUSCULAR; INTRAVENOUS PRN
Status: DISCONTINUED | OUTPATIENT
Start: 2025-07-28 | End: 2025-07-28

## 2025-07-28 RX ADMIN — PROPOFOL 40 MG: 10 INJECTION, EMULSION INTRAVENOUS at 12:05

## 2025-07-28 RX ADMIN — FENTANYL CITRATE 25 MCG: 50 INJECTION INTRAMUSCULAR; INTRAVENOUS at 12:42

## 2025-07-28 RX ADMIN — PROPOFOL 160 MG: 10 INJECTION, EMULSION INTRAVENOUS at 12:02

## 2025-07-28 RX ADMIN — FENTANYL CITRATE 25 MCG: 50 INJECTION INTRAMUSCULAR; INTRAVENOUS at 12:48

## 2025-07-28 RX ADMIN — SODIUM CHLORIDE, SODIUM LACTATE, POTASSIUM CHLORIDE, AND CALCIUM CHLORIDE 100 ML/HR: .6; .31; .03; .02 INJECTION, SOLUTION INTRAVENOUS at 08:57

## 2025-07-28 RX ADMIN — FENTANYL CITRATE 25 MCG: 50 INJECTION INTRAMUSCULAR; INTRAVENOUS at 13:05

## 2025-07-28 RX ADMIN — MIDAZOLAM HYDROCHLORIDE 2 MG: 1 INJECTION, SOLUTION INTRAMUSCULAR; INTRAVENOUS at 12:02

## 2025-07-28 RX ADMIN — ACETAMINOPHEN 975 MG: 325 TABLET ORAL at 08:40

## 2025-07-28 RX ADMIN — FENTANYL CITRATE 25 MCG: 50 INJECTION INTRAMUSCULAR; INTRAVENOUS at 12:19

## 2025-07-28 RX ADMIN — FENTANYL CITRATE 25 MCG: 50 INJECTION INTRAMUSCULAR; INTRAVENOUS at 13:42

## 2025-07-28 RX ADMIN — FENTANYL CITRATE 25 MCG: 50 INJECTION INTRAMUSCULAR; INTRAVENOUS at 12:02

## 2025-07-28 RX ADMIN — FENTANYL CITRATE 25 MCG: 50 INJECTION INTRAMUSCULAR; INTRAVENOUS at 13:50

## 2025-07-28 RX ADMIN — LIDOCAINE HYDROCHLORIDE 40 MG: 20 INJECTION, SOLUTION INFILTRATION; PERINEURAL at 12:02

## 2025-07-28 RX ADMIN — ONDANSETRON HYDROCHLORIDE 4 MG: 2 SOLUTION INTRAMUSCULAR; INTRAVENOUS at 12:02

## 2025-07-28 RX ADMIN — DEXAMETHASONE SODIUM PHOSPHATE 4 MG: 4 INJECTION, SOLUTION INTRA-ARTICULAR; INTRALESIONAL; INTRAMUSCULAR; INTRAVENOUS; SOFT TISSUE at 12:02

## 2025-07-28 RX ADMIN — Medication 2 G: at 11:53

## 2025-07-28 RX ADMIN — FENTANYL CITRATE 25 MCG: 50 INJECTION INTRAMUSCULAR; INTRAVENOUS at 13:26

## 2025-07-28 RX ADMIN — TOLTERODINE TARTRATE 2 MG: 2 TABLET, FILM COATED ORAL at 14:42

## 2025-07-28 ASSESSMENT — ACTIVITIES OF DAILY LIVING (ADL)
ADLS_ACUITY_SCORE: 55

## 2025-07-28 ASSESSMENT — LIFESTYLE VARIABLES: TOBACCO_USE: 1

## 2025-07-28 NOTE — ANESTHESIA POSTPROCEDURE EVALUATION
Patient: Donna Perea    Procedure: Procedure(s):  CYSTOURETEROSCOPY, STENT REMOVAL. WITH LEFT LITHOTRIPSY USING LASER AND URETERAL STENT INSERTION       Anesthesia Type:  General    Note:  Disposition: Outpatient   Postop Pain Control: Uneventful            Sign Out: Well controlled pain   PONV: No   Neuro/Psych: Uneventful            Sign Out: Acceptable/Baseline neuro status   Airway/Respiratory: Uneventful            Sign Out: Acceptable/Baseline resp. status   CV/Hemodynamics: Uneventful            Sign Out: Acceptable CV status; No obvious hypovolemia; No obvious fluid overload   Other NRE: NONE   DID A NON-ROUTINE EVENT OCCUR? No           Last vitals:  Vitals Value Taken Time   /105 07/28/25 14:15   Temp 97.7  F (36.5  C) 07/28/25 14:10   Pulse 80 07/28/25 14:15   Resp 21 07/28/25 14:15   SpO2 93 % 07/28/25 14:16   Vitals shown include unfiled device data.    Electronically Signed By: GISELLE Dueñas CRNA  July 28, 2025  2:25 PM

## 2025-07-28 NOTE — ANESTHESIA CARE TRANSFER NOTE
Patient: Donna Perea    Procedure: Procedure(s):  CYSTOURETEROSCOPY, STENT REMOVAL. WITH LEFT LITHOTRIPSY USING LASER AND URETERAL STENT INSERTION       Diagnosis: Left ureteral calculus [N20.1]  Diagnosis Additional Information: No value filed.    Anesthesia Type:   General     Note:    Oropharynx: oropharynx clear of all foreign objects  Level of Consciousness: drowsy  Oxygen Supplementation: face mask  Level of Supplemental Oxygen (L/min / FiO2): 6  Independent Airway: airway patency satisfactory and stable    Vital Signs Stable: post-procedure vital signs reviewed and stable  Report to RN Given: handoff report given  Patient transferred to: PACU    Handoff Report: Identifed the Patient, Identified the Reponsible Provider, Reviewed the pertinent medical history, Discussed the surgical course, Reviewed Intra-OP anesthesia mangement and issues during anesthesia, Set expectations for post-procedure period and Allowed opportunity for questions and acknowledgement of understanding    Vitals:  Vitals Value Taken Time   /115 07/28/25 13:51   Temp 97.4  F (36.3  C) 07/28/25 13:50   Pulse 81 07/28/25 13:54   Resp 16 07/28/25 13:54   SpO2 94 % 07/28/25 13:54   Vitals shown include unfiled device data.    Electronically Signed By: GISELLE BRYAN CRNA  July 28, 2025  1:54 PM

## 2025-07-28 NOTE — BRIEF OP NOTE
[facility]    Brief Operative Note    Pre-operative diagnosis: Left ureteral calculus [N20.1]  Post-operative diagnosis 12X 6 mm obstructing UPJ stone, history of acute cystitis without hematuria, history of early sepsis.    Procedure: CYSTOURETEROSCOPY, STENT REMOVAL. WITH LEFT LITHOTRIPSY USING LASER AND URETERAL STENT INSERTION, Left - Ureter    Surgeon: Surgeons and Role:     * Kal Mcwilliams MD - Primary  Anesthesia: General   Estimated Blood Loss: Minimal    Drains: 6 X24 with tether left  Specimens:   ID Type Source Tests Collected by Time Destination   A : LEFT KIDNEY STONE Calculus/Stone Kidney, Left STONE ANALYSIS Kal Mcwilliams MD 7/28/2025  1:34 PM      Findings:   Large renal pelvic stone.  Complications: None.  Implants:   Implant Name Type Inv. Item Serial No.  Lot No. LRB No. Used Action   STENT URETERAL CONTOUR SOFT PERCUFLEX [id number] - [id number] Stent STENT URETERAL CONTOUR SOFT PERCUFLEX [id number]  [employer] [id number] Left 1 Implanted   STENT URETERAL CONTOUR SOFT PERCUFLEX [id number] - [id number] Stent STENT URETERAL CONTOUR SOFT PERCUFLEX [id number]  [employer] [id number] Left 1 Explanted

## 2025-07-28 NOTE — OP NOTE
Operative report    Preoperative diagnosis: 12X 6 mm obstructing UPJ stone, history of acute cystitis without hematuria, history of early sepsis resolved.     Postoperative diagnosis: Same    Procedure: Cystoscopy, left stent removal, left retrograde pyelogram, left flexible ureteroscopy, left basketing of stone, left 6 x 24 stent placement with tether    Surgeon: Kal Mcwilliams MD    Anesthesiologist: Jasmin Houston CRNA    Anesthesia: General    Description:    Informed consent was obtained.  Risks of bleeding, infection, ureteral injury or stricture, stent discomfort including urgency and frequency, urinary retention, bladder injury, the need for a staged procedure secondary to the inability to access the stone, DVT, PE, pneumonia and cardiac or anesthetic complications were explained to the patient.  Site had been marked and designated.  Films were reviewed.    The patient was taken to the procedure room.  General anesthesia was administered.  Antibiotics had been given prior to the procedure per protocol.  Timeout was done all were in agreement.    The patient was placed lithotomy.  Prepped and draped in sterile fashion.  The patient was personally positioned by me and all pressure points were padded appropriately.    Cystoscopy was done with the rigid cystoscope using a 30 and 70 degree lens and the 22 Tajik sheath.  The bladder was surveyed and found to be normal without any visible tumor stone or lesion.    The left ureter was visualized.  Left stent was removed with stent graspers.  An open-ended catheter was used to intubate the ureter and a retrograde was done.  Findings included large filling defect left renal pelvis, underwen.    Through the open-ended catheter a 0.38 Glidewire was advanced past the stone into the collecting system and used as a safety wire.  The open-ended catheter was then offloaded.      A second 0.35 sensor wire was placed adjacent to the first wire through the dual-lumen into the  collecting system and secured appropriately.    The cystoscope was removed.      Over the 0.35 sensor wire a 10.7 x 35 ureteral access sheath was advanced fluoroscopically to the proximal UPJ.  Contrast was used to verify its placement and to ensure no injury or extravasation.    A flexible ureteroscope was advanced through the access sheath up to the proximal ureter and into the collecting system.  The upper, middle, and lower pole calyces were explored.  Stones were seen in the renal pelvis and was large measuring at least 12 x 10 mm.    Using a thulium laser with settings of 0.2 and 40, the stone was ablated into several pieces.  Any large fragments were basketed and removed with a tipless nitinol tight basket.  Any smaller stone fragments were left to pass on their own irrigated out.    Contrast was instilled in the collecting system indicating no injury or extravasation.    Over the wire and through the cystoscope a 6 X24 double-J stent was placed with a curl noted in the collecting system and in the bladder.    The bladder was drained and the patient was awakened.  A tether was left attached to the stent.  It was secured to the outer labia with Steri-Strips.    The patient was then awakened and transported to recovery room in good condition.    Complications: None    Specimens: Left renal stone    Drains: 6 X24 with a tether    Findings: Large calcium appearing stone, no injury    Disposition: To recovery room in good condition.  Patient will be dismissed home with follow-up in 7-10 days for stent removal.    Kal Mcwilliams MD

## 2025-07-28 NOTE — OR NURSING
PACU Respiratory Event Documentation     1) Episodes of Apnea greater than or equal to 10 seconds: no    2) Bradypnea - less than 8 breaths per minute: no    3) Pain score on 0 to 10 scale: denies    4) Pain-sedation mismatch (yes or no): no    5) Repeated 02 desaturation less than 90% (yes or no): no    Anesthesia notified? (yes or no): no    Any of the above events occuring repeatedly in separate 30 minute intervals may be considered recurrent PACU respiratory events.     Amy Garrett RN

## 2025-07-28 NOTE — ANESTHESIA PROCEDURE NOTES
Airway       Patient location during procedure: OR       Procedure Start/Stop Times: 7/28/2025 12:03 PM  Staff -        CRNA: Uma Aguirre APRN CRNA       Performed By: CRNA  Consent for Airway        Urgency: elective  Indications and Patient Condition       Indications for airway management: dejuan-procedural       Induction type:intravenous       Mask difficulty assessment: 0 - not attempted    Final Airway Details       Final airway type: supraglottic airway    Supraglottic Airway Details        Type: LMA       Brand: I-Gel       LMA size: 4    Post intubation assessment        Placement verified by: capnometry        Number of attempts at approach: 1       Ease of procedure: easy       Dentition: Intact    Additional Comments       Lower dentures removed due to becoming loose

## 2025-07-28 NOTE — ANESTHESIA PREPROCEDURE EVALUATION
Anesthesia Pre-Procedure Evaluation    Patient: Donna Perea   MRN: [id number] : 1955          Procedure : Procedure(s):  CYSTOURETEROSCOPY, WITH LITHOTRIPSY USING LASER AND URETERAL STENT INSERTION         History reviewed. No pertinent past medical history.   Past Surgical History:   Procedure Laterality Date    COMBINED CYSTOSCOPY, INSERT STENT URETER(S) Left 2025    Procedure: CYSTOSCOPY, WITH LEFT RETROGRADE AND URETERAL STENT PLACEMENT;  Surgeon: Kal Mcwilliams MD;  Location: [facility] OR    OTHER SURGICAL HISTORY      ,SUR,APPENDECTOMY OPEN,intussesception and appy      No Known Allergies   Social History     Tobacco Use    Smoking status: Former     Current packs/day: 0.00     Types: Cigarettes     Quit date: 1/15/1991     Years since quittin.5    Smokeless tobacco: Never   Substance Use Topics    Alcohol use: Yes     Alcohol/week: 0.0 standard drinks of alcohol     Comment: 2 glasses a week       Wt Readings from Last 1 Encounters:   25 100.2 kg (221 lb)        Anesthesia Evaluation   Pt has had prior anesthetic. Type: MAC.    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    Other Findings Upper and lower dentures, currently in place      OUTSIDE LABS:  CBC:   Lab Results   Component Value Date    WBC 12.7 (H) 07/09/2025    WBC 16.4 (H) 07/07/2025    HGB 11.7 07/09/2025    HGB 12.5 07/07/2025    HCT 36.5 07/09/2025    HCT 39.0 07/07/2025     (L) 07/09/2025     (L) 07/07/2025     BMP:   Lab Results   Component Value Date     07/16/2025     07/09/2025    POTASSIUM 4.2 07/16/2025    POTASSIUM 3.5 07/09/2025    CHLORIDE 107 07/16/2025    CHLORIDE 111 (H) 07/09/2025    CO2 25 07/16/2025    CO2 24 07/09/2025    BUN 20.5 07/16/2025    BUN 24.7 (H) 07/09/2025    CR 0.81 07/16/2025    CR 0.97 (H) 07/09/2025     (H) 07/16/2025    GLC 91 07/09/2025     COAGS: No results found for: \"PTT\", \"INR\", \"FIBR\"  POC: No results found for: \"BGM\", \"HCG\", \"HCGS\"  HEPATIC:   Lab Results   Component Value Date    ALBUMIN 4.1 07/06/2025    PROTTOTAL 7.6 07/06/2025    ALT 19 07/06/2025    AST 26 07/06/2025    ALKPHOS 90 07/06/2025    BILITOTAL 0.6 07/06/2025     OTHER:   Lab Results   Component Value Date    LACT 1.3 07/08/2025    SIOBHAN 8.9 07/16/2025    MAG 1.8 04/06/2025    LIPASE 17 07/06/2025       Anesthesia Plan    ASA Status:  2      NPO Status: NPO Appropriate   Anesthesia Type: General.  Airway: supraglottic airway.   Techniques and Equipment:     - Airway:  Planned airway equipment includes supraglottic airway.     - Monitoring Plan: standard ASA monitoring     Consents    Anesthesia Plan(s) and associated risks, benefits, and realistic alternatives discussed. Questions answered and patient/representative(s) expressed understanding.     - Discussed: CRNA     - Discussed with:  Patient        - Pt is DNR/DNI Status: no DNR          Postoperative Care    Pain management: multimodal analgesia.     Comments:                   GISELLE Dueñas CRNA    I have reviewed the pertinent notes and labs in the chart from the past 30 days and (re)examined the patient.  Any " "updates or changes from those notes are reflected in this note.    Clinically Significant Risk Factors Present on Admission                   # Hypertension: Noted on problem list           # Obesity: Estimated body mass index is 36.78 kg/m  as calculated from the following:    Height as of this encounter: 1.651 m (5' 5\").    Weight as of this encounter: 100.2 kg (221 lb).                    "

## 2025-07-28 NOTE — INTERVAL H&P NOTE
"I have reviewed the surgical (or preoperative) H&P that is linked to this encounter, and examined the patient. There are no significant changes    Plan cystoscopy with left stent removal, left flexible ureteroscopy, laser lithotripsy, basketing of stone fragments and stent placement.  Risks of bleeding, infection, ureteral injury or stricture as well as bladder injury were discussed.    She understands and wishes to proceed.    General: Alert, no apparent distress  HEENT: No jaundice, mucous membranes moist  CV: RRR  Respiratory: CTA bilaterally  Extremities: Moving all 4  Neuro: Alert and oriented x 3  Psych: Normal affect pleasant      Clinical Conditions Present on Arrival:  Clinically Significant Risk Factors Present on Admission          # Hyperchloremia: Highest Cl = 111 mmol/L in last 30 days, will monitor as appropriate       # Hypocalcemia: Lowest Ca = 8 mg/dL in last 30 days, will monitor and replace as appropriate       # Thrombocytopenia: Lowest platelets = 107 in last 30 days, will monitor for bleeding      # Obesity: Estimated body mass index is 36.78 kg/m  as calculated from the following:    Height as of this encounter: 1.651 m (5' 5\").    Weight as of this encounter: 100.2 kg (221 lb).       "

## 2025-07-28 NOTE — OR NURSING
Donna has been discharged to home at 1515 via W/C accompanied by JESÚS Lagunas    Written discharge instructions were provided to patient.  Prescriptions were e-scribed.  Patient states their pain is zero, and denies any nausea or dizziness upon discharge.    Patient and adult caring for them verbalize understanding of discharge instructions including no driving until tomorrow and no longer taking narcotic pain medications - no operating mechanical equipment and no making any important decisions.They understand reason for discharge, and necessary follow-up appointments.      Rissa Cruz RN on 7/28/2025 at 3:33 PM

## 2025-08-05 ENCOUNTER — DOCUMENTATION ONLY (OUTPATIENT)
Dept: UROLOGY | Facility: OTHER | Age: 70
End: 2025-08-05
Payer: MEDICARE

## 2025-08-05 DIAGNOSIS — N20.1 LEFT URETERAL CALCULUS: Primary | ICD-10-CM

## 2025-08-11 ENCOUNTER — LAB (OUTPATIENT)
Dept: LAB | Facility: OTHER | Age: 70
End: 2025-08-11
Attending: UROLOGY
Payer: MEDICARE

## 2025-08-11 ENCOUNTER — APPOINTMENT (OUTPATIENT)
Dept: UROLOGY | Facility: OTHER | Age: 70
End: 2025-08-11
Attending: UROLOGY
Payer: MEDICARE

## 2025-08-11 DIAGNOSIS — N20.1 LEFT URETERAL CALCULUS: Primary | ICD-10-CM

## 2025-08-11 DIAGNOSIS — N20.1 LEFT URETERAL CALCULUS: ICD-10-CM

## 2025-08-11 LAB
CALCIUM SERPL-MCNC: 9.3 MG/DL (ref 8.8–10.4)
PHOSPHATE SERPL-MCNC: 3.3 MG/DL (ref 2.5–4.5)
URATE SERPL-MCNC: 6.7 MG/DL (ref 2.4–5.7)

## 2025-08-11 PROCEDURE — 84550 ASSAY OF BLOOD/URIC ACID: CPT | Mod: ZL

## 2025-08-11 PROCEDURE — 83970 ASSAY OF PARATHORMONE: CPT | Mod: ZL

## 2025-08-11 PROCEDURE — 36415 COLL VENOUS BLD VENIPUNCTURE: CPT | Mod: ZL

## 2025-08-11 PROCEDURE — 84100 ASSAY OF PHOSPHORUS: CPT | Mod: ZL

## 2025-08-11 PROCEDURE — 82310 ASSAY OF CALCIUM: CPT | Mod: ZL

## 2025-08-12 LAB — PTH-INTACT SERPL-MCNC: 88 PG/ML (ref 15–65)

## 2025-08-13 DIAGNOSIS — R79.89 ELEVATED PARATHYROID HORMONE RELATED PEPTIDE LEVEL: Primary | ICD-10-CM

## 2025-08-13 DIAGNOSIS — Z87.442 HISTORY OF NEPHROLITHIASIS: ICD-10-CM

## (undated) DEVICE — Device

## (undated) DEVICE — CATH URETERAL 5FRX70CM OPEN END FLEX TIP G14521

## (undated) DEVICE — PUMP SYSTEM SINGLE ACTION M0067201000

## (undated) DEVICE — SLEEVE COMPRESSION SCD KNEE MED 74022

## (undated) DEVICE — SOL WATER 1500ML

## (undated) DEVICE — GLOVE PROTEXIS POWDER FREE ORANGE 7.5  2D72PT75X

## (undated) DEVICE — BAG DRAINAGE URO CATCHER II 4-040-14-10

## (undated) DEVICE — SOLTIVE SUPERPULSED LASER FIBER

## (undated) DEVICE — PACK CYSTO SBA15CSFCA

## (undated) DEVICE — GW UROSTREAM HYDROPHILIC NITINOL TUNGSTEN G59155

## (undated) DEVICE — SPONGE RAY-TEC 4X4" 7317

## (undated) DEVICE — PAD CHUX UNDERPAD 30X36" P3036C

## (undated) DEVICE — LASER FIBER

## (undated) DEVICE — CATH URETERAL DL 6FR FLEX-TIP 10FRX50CM G17323 AQ-022610

## (undated) DEVICE — SUCTION MANIFOLD NEPTUNE 2 SYS 4 PORT 0702-020-000

## (undated) DEVICE — BASKET NITINOL TIPLESS HALO  1.5FRX120CM 554120

## (undated) DEVICE — CATH URETERAL OPEN END 05FR 70CM 020015

## (undated) RX ORDER — PROPOFOL 10 MG/ML
INJECTION, EMULSION INTRAVENOUS
Status: DISPENSED
Start: 2025-07-07

## (undated) RX ORDER — PIPERACILLIN SODIUM, TAZOBACTAM SODIUM 3; .375 G/15ML; G/15ML
INJECTION, POWDER, LYOPHILIZED, FOR SOLUTION INTRAVENOUS
Status: DISPENSED
Start: 2025-07-06

## (undated) RX ORDER — DEXAMETHASONE SODIUM PHOSPHATE 4 MG/ML
INJECTION, SOLUTION INTRA-ARTICULAR; INTRALESIONAL; INTRAMUSCULAR; INTRAVENOUS; SOFT TISSUE
Status: DISPENSED
Start: 2025-07-07

## (undated) RX ORDER — ONDANSETRON 2 MG/ML
INJECTION INTRAMUSCULAR; INTRAVENOUS
Status: DISPENSED
Start: 2025-07-06

## (undated) RX ORDER — KETOROLAC TROMETHAMINE 15 MG/ML
INJECTION, SOLUTION INTRAMUSCULAR; INTRAVENOUS
Status: DISPENSED
Start: 2018-03-12

## (undated) RX ORDER — ONDANSETRON 2 MG/ML
INJECTION INTRAMUSCULAR; INTRAVENOUS
Status: DISPENSED
Start: 2025-07-28

## (undated) RX ORDER — FENTANYL CITRATE 50 UG/ML
INJECTION, SOLUTION INTRAMUSCULAR; INTRAVENOUS
Status: DISPENSED
Start: 2025-07-07

## (undated) RX ORDER — FENTANYL CITRATE 50 UG/ML
INJECTION, SOLUTION INTRAMUSCULAR; INTRAVENOUS
Status: DISPENSED
Start: 2025-07-06

## (undated) RX ORDER — PIPERACILLIN SODIUM, TAZOBACTAM SODIUM 3; .375 G/15ML; G/15ML
INJECTION, POWDER, LYOPHILIZED, FOR SOLUTION INTRAVENOUS
Status: DISPENSED
Start: 2025-07-07

## (undated) RX ORDER — ACETAMINOPHEN 325 MG/1
TABLET ORAL
Status: DISPENSED
Start: 2025-07-28

## (undated) RX ORDER — ONDANSETRON 2 MG/ML
INJECTION INTRAMUSCULAR; INTRAVENOUS
Status: DISPENSED
Start: 2025-07-07

## (undated) RX ORDER — METOCLOPRAMIDE HYDROCHLORIDE 5 MG/ML
INJECTION INTRAMUSCULAR; INTRAVENOUS
Status: DISPENSED
Start: 2018-03-12

## (undated) RX ORDER — TOLTERODINE TARTRATE 2 MG/1
TABLET, EXTENDED RELEASE ORAL
Status: DISPENSED
Start: 2025-07-28

## (undated) RX ORDER — HYDROMORPHONE HYDROCHLORIDE 1 MG/ML
INJECTION, SOLUTION INTRAMUSCULAR; INTRAVENOUS; SUBCUTANEOUS
Status: DISPENSED
Start: 2018-03-12

## (undated) RX ORDER — SODIUM CHLORIDE, SODIUM LACTATE, POTASSIUM CHLORIDE, CALCIUM CHLORIDE 600; 310; 30; 20 MG/100ML; MG/100ML; MG/100ML; MG/100ML
INJECTION, SOLUTION INTRAVENOUS
Status: DISPENSED
Start: 2025-07-07

## (undated) RX ORDER — SODIUM CHLORIDE, SODIUM LACTATE, POTASSIUM CHLORIDE, CALCIUM CHLORIDE 600; 310; 30; 20 MG/100ML; MG/100ML; MG/100ML; MG/100ML
INJECTION, SOLUTION INTRAVENOUS
Status: DISPENSED
Start: 2025-07-06

## (undated) RX ORDER — METHYLPREDNISOLONE SODIUM SUCCINATE 125 MG/2ML
INJECTION INTRAMUSCULAR; INTRAVENOUS
Status: DISPENSED
Start: 2025-07-06

## (undated) RX ORDER — FENTANYL CITRATE 50 UG/ML
INJECTION, SOLUTION INTRAMUSCULAR; INTRAVENOUS
Status: DISPENSED
Start: 2025-07-28

## (undated) RX ORDER — IPRATROPIUM BROMIDE AND ALBUTEROL SULFATE 2.5; .5 MG/3ML; MG/3ML
SOLUTION RESPIRATORY (INHALATION)
Status: DISPENSED
Start: 2025-07-06

## (undated) RX ORDER — TAMSULOSIN HYDROCHLORIDE 0.4 MG/1
CAPSULE ORAL
Status: DISPENSED
Start: 2018-03-12

## (undated) RX ORDER — HYDRALAZINE HYDROCHLORIDE 20 MG/ML
INJECTION INTRAMUSCULAR; INTRAVENOUS
Status: DISPENSED
Start: 2025-07-06

## (undated) RX ORDER — SODIUM CHLORIDE, SODIUM LACTATE, POTASSIUM CHLORIDE, CALCIUM CHLORIDE 600; 310; 30; 20 MG/100ML; MG/100ML; MG/100ML; MG/100ML
INJECTION, SOLUTION INTRAVENOUS
Status: DISPENSED
Start: 2025-07-28

## (undated) RX ORDER — DEXAMETHASONE SODIUM PHOSPHATE 4 MG/ML
INJECTION, SOLUTION INTRA-ARTICULAR; INTRALESIONAL; INTRAMUSCULAR; INTRAVENOUS; SOFT TISSUE
Status: DISPENSED
Start: 2025-07-28

## (undated) RX ORDER — PROPOFOL 10 MG/ML
INJECTION, EMULSION INTRAVENOUS
Status: DISPENSED
Start: 2025-07-28

## (undated) RX ORDER — CEFAZOLIN SODIUM/WATER 2 G/20 ML
SYRINGE (ML) INTRAVENOUS
Status: DISPENSED
Start: 2025-07-28

## (undated) RX ORDER — KETOROLAC TROMETHAMINE 15 MG/ML
INJECTION, SOLUTION INTRAMUSCULAR; INTRAVENOUS
Status: DISPENSED
Start: 2025-07-06

## (undated) RX ORDER — DIPHENHYDRAMINE HYDROCHLORIDE 50 MG/ML
INJECTION, SOLUTION INTRAMUSCULAR; INTRAVENOUS
Status: DISPENSED
Start: 2025-07-06